# Patient Record
Sex: FEMALE | Race: WHITE | NOT HISPANIC OR LATINO | Employment: OTHER | URBAN - METROPOLITAN AREA
[De-identification: names, ages, dates, MRNs, and addresses within clinical notes are randomized per-mention and may not be internally consistent; named-entity substitution may affect disease eponyms.]

---

## 2017-12-18 ENCOUNTER — OFFICE VISIT (OUTPATIENT)
Dept: OBSTETRICS AND GYNECOLOGY | Facility: CLINIC | Age: 57
End: 2017-12-18
Attending: OBSTETRICS & GYNECOLOGY
Payer: COMMERCIAL

## 2017-12-18 VITALS
SYSTOLIC BLOOD PRESSURE: 140 MMHG | BODY MASS INDEX: 25.5 KG/M2 | DIASTOLIC BLOOD PRESSURE: 86 MMHG | HEIGHT: 62 IN | WEIGHT: 138.56 LBS

## 2017-12-18 DIAGNOSIS — Z12.31 ENCOUNTER FOR SCREENING MAMMOGRAM FOR MALIGNANT NEOPLASM OF BREAST: Primary | ICD-10-CM

## 2017-12-18 PROCEDURE — 99396 PREV VISIT EST AGE 40-64: CPT | Mod: S$GLB,,, | Performed by: OBSTETRICS & GYNECOLOGY

## 2017-12-18 PROCEDURE — 99999 PR PBB SHADOW E&M-EST. PATIENT-LVL III: CPT | Mod: PBBFAC,,, | Performed by: OBSTETRICS & GYNECOLOGY

## 2017-12-18 RX ORDER — CYCLOBENZAPRINE HCL 5 MG
TABLET ORAL
COMMUNITY
Start: 2017-11-09 | End: 2017-12-18

## 2017-12-18 NOTE — PROGRESS NOTES
Subjective:       Patient ID: Luisa Padilla is a 57 y.o. female.    Chief Complaint:  Well Woman (pap nl/hpv- 2016 mammo 2017)      History of Present Illness  - here for annual. No Gyn complaints.  is having multiple medical issues (diabetes and gout) and is having some emotional difficulty with it.    Past Medical History:   Diagnosis Date    BRCA gene mutation negative     Menopause        Past Surgical History:   Procedure Laterality Date     SECTION      x 2         Current Outpatient Prescriptions:     fexofenadine (ALLEGRA) 30 MG tablet, Take 30 mg by mouth once daily., Disp: , Rfl:     ibuprofen (ADVIL,MOTRIN) 200 MG tablet, Take 200 mg by mouth every 6 (six) hours as needed., Disp: , Rfl:     Review of patient's allergies indicates:  No Known Allergies    GYN & OB History  No LMP recorded. Patient is postmenopausal.   Date of Last Pap: 2016    OB History    Para Term  AB Living   4 2 2   2 2   SAB TAB Ectopic Multiple Live Births   2       2      # Outcome Date GA Lbr Eric/2nd Weight Sex Delivery Anes PTL Lv   4 Term 10/20/97     CS-LTranv   ERIK   3 Term 94     CS-LTranv   ERIK   2 SAB            1 SAB                   Social History     Social History    Marital status:      Spouse name: N/A    Number of children: N/A    Years of education: N/A     Occupational History    Not on file.     Social History Main Topics    Smoking status: Never Smoker    Smokeless tobacco: Never Used    Alcohol use Yes      Comment: daily    Drug use: No    Sexual activity: No     Other Topics Concern    Not on file     Social History Narrative    No narrative on file       Family History   Problem Relation Age of Onset    Breast cancer Sister     Colon cancer Neg Hx     Ovarian cancer Neg Hx        Review of Systems  Review of Systems   Respiratory: Negative for shortness of breath.    Cardiovascular: Negative for chest pain and palpitations.  "  Gastrointestinal: Negative for blood in stool, nausea and vomiting.   Genitourinary:        - see HPI   Skin: Negative for rash and wound.   Allergic/Immunologic: Negative for immunocompromised state.   Neurological: Negative for dizziness and syncope.   Hematological: Negative for adenopathy.   Psychiatric/Behavioral: Negative for behavioral problems.        Objective:     Vitals:    12/18/17 1028   BP: (!) 140/86   Weight: 62.9 kg (138 lb 9 oz)   Height: 5' 2" (1.575 m)       Physical Exam:   Constitutional: She is oriented to person, place, and time. She appears well-developed and well-nourished.        Pulmonary/Chest: Right breast exhibits no mass, no nipple discharge, no skin change, no tenderness and no swelling. Left breast exhibits no mass, no nipple discharge, no skin change, no tenderness and no swelling. Breasts are symmetrical.        Abdominal: Soft. She exhibits no distension. There is no tenderness.     Genitourinary: Vagina normal and uterus normal. There is no tenderness or lesion on the right labia. There is no tenderness or lesion on the left labia. Cervix is normal. Right adnexum displays no mass, no tenderness and no fullness. Left adnexum displays no mass, no tenderness and no fullness. No vaginal discharge found.   Genitourinary Comments: Stenotic cervical os, atrophic           Musculoskeletal: Moves all extremeties.       Neurological: She is alert and oriented to person, place, and time.     Psychiatric: She has a normal mood and affect.        Assessment/ Plan:     Orders Placed This Encounter    Mammo Digital Screening Bilat with Tomosynthesis CAD       Luisa was seen today for well woman.    Diagnoses and all orders for this visit:    Encounter for screening mammogram for malignant neoplasm of breast  -     Mammo Digital Screening Bilat with Tomosynthesis CAD; Future  -     Mammo Digital Screening Bilat with Tomosynthesis CAD        Return in about 1 year (around 12/18/2018).  "

## 2019-02-08 ENCOUNTER — OFFICE VISIT (OUTPATIENT)
Dept: OBSTETRICS AND GYNECOLOGY | Facility: CLINIC | Age: 59
End: 2019-02-08
Payer: COMMERCIAL

## 2019-02-08 VITALS
DIASTOLIC BLOOD PRESSURE: 80 MMHG | SYSTOLIC BLOOD PRESSURE: 130 MMHG | WEIGHT: 135.81 LBS | BODY MASS INDEX: 24.84 KG/M2

## 2019-02-08 DIAGNOSIS — Z12.11 SCREENING FOR COLON CANCER: ICD-10-CM

## 2019-02-08 DIAGNOSIS — Z01.419 ENCOUNTER FOR GYNECOLOGICAL EXAMINATION: Primary | ICD-10-CM

## 2019-02-08 DIAGNOSIS — Z12.31 ENCOUNTER FOR SCREENING MAMMOGRAM FOR MALIGNANT NEOPLASM OF BREAST: ICD-10-CM

## 2019-02-08 PROCEDURE — 99999 PR PBB SHADOW E&M-EST. PATIENT-LVL III: ICD-10-PCS | Mod: PBBFAC,,, | Performed by: OBSTETRICS & GYNECOLOGY

## 2019-02-08 PROCEDURE — 99396 PR PREVENTIVE VISIT,EST,40-64: ICD-10-PCS | Mod: S$GLB,,, | Performed by: OBSTETRICS & GYNECOLOGY

## 2019-02-08 PROCEDURE — 99999 PR PBB SHADOW E&M-EST. PATIENT-LVL III: CPT | Mod: PBBFAC,,, | Performed by: OBSTETRICS & GYNECOLOGY

## 2019-02-08 PROCEDURE — 99396 PREV VISIT EST AGE 40-64: CPT | Mod: S$GLB,,, | Performed by: OBSTETRICS & GYNECOLOGY

## 2019-02-08 RX ORDER — BIOTIN 1 MG
1000 TABLET ORAL 3 TIMES DAILY
COMMUNITY

## 2019-02-08 RX ORDER — MAGNESIUM 200 MG
TABLET ORAL ONCE
COMMUNITY
End: 2021-12-17

## 2019-02-08 NOTE — PROGRESS NOTES
Subjective:       Patient ID: Luisa Padilla is a 58 y.o. female.    Chief Complaint:  Well Woman (pap nl/hpv- 2016 mammo 3/2018, colonoscopy 5 yrs ago with Dr. Fallon)      History of Present Illness  - here for annual.   - Sometimes feels dizzy. Doesn't happen all the time. Sometimes at night has cramping; kind of feels like menstrual cramps but only occasionally occurs.  - taking meds for slow BMs. Doesn't drink much water.  - due for colonoscopy. Was seeing Dr. Fallon. Changing to [facility] due to insurance.  -  is still having medical issues and doesn't talk about them with his family.    Past Medical History:   Diagnosis Date    BRCA gene mutation negative     Menopause        Past Surgical History:   Procedure Laterality Date     SECTION      x 2         Current Outpatient Medications:     biotin 1 mg tablet, Take 1,000 mcg by mouth 3 (three) times daily., Disp: , Rfl:     docusate sodium (STOOL SOFTENER ORAL), Take by mouth., Disp: , Rfl:     fexofenadine (ALLEGRA) 30 MG tablet, Take 30 mg by mouth once daily., Disp: , Rfl:     magnesium 200 mg Tab, Take by mouth once., Disp: , Rfl:     Review of patient's allergies indicates:  No Known Allergies    GYN & OB History  No LMP recorded. Patient is postmenopausal.   Date of Last Pap: 2016    OB History    Para Term  AB Living   4 2 2   2 2   SAB TAB Ectopic Multiple Live Births   2       2      # Outcome Date GA Lbr Eric/2nd Weight Sex Delivery Anes PTL Lv   4 Term 10/20/97     CS-LTranv   ERIK   3 Term 94     CS-LTranv   ERIK   2 SAB            1 SAB                   Social History     Socioeconomic History    Marital status:      Spouse name: Not on file    Number of children: Not on file    Years of education: Not on file    Highest education level: Not on file   Social Needs    Financial resource strain: Not on file    Food insecurity - worry: Not on file    Food insecurity - inability: Not on file     Transportation needs - medical: Not on file    Transportation needs - non-medical: Not on file   Occupational History    Not on file   Tobacco Use    Smoking status: Never Smoker    Smokeless tobacco: Never Used   Substance and Sexual Activity    Alcohol use: Yes     Comment: daily    Drug use: No    Sexual activity: Not Currently   Other Topics Concern    Not on file   Social History Narrative    Not on file       Family History   Problem Relation Age of Onset    Breast cancer Sister     Colon cancer Neg Hx     Ovarian cancer Neg Hx        Review of Systems  Review of Systems   Respiratory: Negative for shortness of breath.    Cardiovascular: Negative for chest pain and palpitations.   Gastrointestinal: Negative for blood in stool, nausea and vomiting.   Genitourinary:        - see HPI   Skin: Negative for rash and wound.   Allergic/Immunologic: Negative for immunocompromised state.   Neurological: Negative for dizziness and syncope.   Hematological: Negative for adenopathy.   Psychiatric/Behavioral: Negative for behavioral problems.        Objective:     Vitals:    02/08/19 1117   BP: 130/80   Weight: 61.6 kg (135 lb 12.9 oz)       Physical Exam:   Constitutional: She is oriented to person, place, and time. She appears well-developed and well-nourished. She is cooperative. No distress.        Pulmonary/Chest: Right breast exhibits no mass, no nipple discharge, no skin change, no tenderness and no swelling. Left breast exhibits no mass, no nipple discharge, no skin change, no tenderness and no swelling. Breasts are symmetrical.        Abdominal: Soft. Normal appearance. She exhibits no distension. There is no tenderness.     Genitourinary: Vagina normal and uterus normal. There is no rash, tenderness or lesion on the right labia. There is no rash, tenderness or lesion on the left labia. Cervix is normal. Right adnexum displays no mass, no tenderness and no fullness. Left adnexum displays no mass, no  tenderness and no fullness. No vaginal discharge found.           Musculoskeletal: Moves all extremeties.       Neurological: She is alert and oriented to person, place, and time.     Psychiatric: She has a normal mood and affect.        Assessment/ Plan:     Orders Placed This Encounter    Mammo Digital Screening Bilat w/ Nicholas    Ambulatory Referral to Gastroenterology       Luisa was seen today for well woman.    Diagnoses and all orders for this visit:    Encounter for gynecological examination    Encounter for screening mammogram for malignant neoplasm of breast  -     Mammo Digital Screening Bilat w/ Nicholas; Future    Screening for colon cancer  -     Ambulatory Referral to Gastroenterology    - will send notice to [facility] service for GI; patient is due for colonoscopy.  - discussed how cramping may be related to less frequent BMs. Advised patient to call me if this becomes worse to consider pelvic u/s. Verbalized understanding.  - advised to increase po hydration. May want to try a decongestant if she feels that her dizziness may be related to weather change.    Follow-up in about 1 year (around 2/8/2020) for annual exam.

## 2019-03-11 ENCOUNTER — OFFICE VISIT (OUTPATIENT)
Dept: GASTROENTEROLOGY | Facility: CLINIC | Age: 59
End: 2019-03-11
Payer: COMMERCIAL

## 2019-03-11 VITALS
BODY MASS INDEX: 25.03 KG/M2 | WEIGHT: 136 LBS | SYSTOLIC BLOOD PRESSURE: 123 MMHG | HEART RATE: 75 BPM | HEIGHT: 62 IN | DIASTOLIC BLOOD PRESSURE: 57 MMHG

## 2019-03-11 DIAGNOSIS — K59.04 CHRONIC IDIOPATHIC CONSTIPATION: Primary | ICD-10-CM

## 2019-03-11 DIAGNOSIS — Z12.11 SCREEN FOR COLON CANCER: ICD-10-CM

## 2019-03-11 PROCEDURE — 99203 PR OFFICE/OUTPT VISIT, NEW, LEVL III, 30-44 MIN: ICD-10-PCS | Mod: S$GLB,,, | Performed by: INTERNAL MEDICINE

## 2019-03-11 PROCEDURE — 99999 PR PBB SHADOW E&M-EST. PATIENT-LVL III: CPT | Mod: PBBFAC,,, | Performed by: INTERNAL MEDICINE

## 2019-03-11 PROCEDURE — 99203 OFFICE O/P NEW LOW 30 MIN: CPT | Mod: S$GLB,,, | Performed by: INTERNAL MEDICINE

## 2019-03-11 PROCEDURE — 99999 PR PBB SHADOW E&M-EST. PATIENT-LVL III: ICD-10-PCS | Mod: PBBFAC,,, | Performed by: INTERNAL MEDICINE

## 2019-03-11 RX ORDER — SODIUM, POTASSIUM,MAG SULFATES 17.5-3.13G
SOLUTION, RECONSTITUTED, ORAL ORAL ONCE
Qty: 354 ML | Refills: 0 | Status: SHIPPED | OUTPATIENT
Start: 2019-03-11 | End: 2019-03-12

## 2019-03-11 NOTE — PROGRESS NOTES
"Subjective:       Patient ID: Luisa Padilla is a 58 y.o. female.    Chief Complaint: Colonoscopy    59 yo F here to discuss screening colonoscopy.  She had her first colonoscopy 10 years ago with Dr. Fallon and was told to repeat in 10 years.  She denies FH of colon cancer, change in bowel habits, rectal bleeding, or weight loss.  She has mild constipation.  She states that she took Miralax in the past, but she was not consistent with it and thus it did not help much.  She is currently taking magnesium pill and colace pill most days; when she is consistent she notes modest results, but mostly she continues to have approximately 3 BM per week.      Review of Systems   Constitutional: Negative for appetite change and unexpected weight change.   Eyes: Negative for photophobia and visual disturbance.   Respiratory: Negative for chest tightness, shortness of breath and wheezing.    Cardiovascular: Negative for chest pain, palpitations and leg swelling.   Genitourinary: Negative for dysuria, flank pain and hematuria.   Musculoskeletal: Negative for joint swelling and myalgias.   Skin: Negative for color change and rash.   Neurological: Negative for dizziness and speech difficulty.   Psychiatric/Behavioral: Negative for confusion and hallucinations.       Objective:       BP (!) 123/57 (BP Location: Right arm, Patient Position: Sitting)   Pulse 75   Ht 5' 2" (1.575 m)   Wt 61.7 kg (136 lb)   BMI 24.87 kg/m²     Physical Exam   Constitutional: She is oriented to person, place, and time. She appears well-developed and well-nourished.   Eyes: EOM are normal. Pupils are equal, round, and reactive to light.   Neck: Normal range of motion. Neck supple.   Cardiovascular: Normal rate and regular rhythm.   Pulmonary/Chest: Effort normal and breath sounds normal.   Abdominal: Soft. Bowel sounds are normal. She exhibits no distension and no mass. There is no tenderness. There is no rebound and no guarding.   Musculoskeletal: Normal " range of motion. She exhibits no edema.   Neurological: She is alert and oriented to person, place, and time.   Psychiatric: She has a normal mood and affect. Her behavior is normal. Judgment and thought content normal.         Assessment:       1. Chronic idiopathic constipation    2. Screen for colon cancer        Plan:       Chronic idiopathic constipation        -     Miralax daily, to be consumed in coffee so it is easier for her to remember.  She does not want to consider prescription meds for constipation at this time.    Screen for colon cancer   -     Case request GI: COLONOSCOPY   -     PREPOPIK 10 mg-3.5 gram-12 gram PwPk; Take 1 Package by mouth once. Use as directed on prep instructions given to you from the office for 1 dose  Dispense: 1 each; Refill: 0

## 2019-03-11 NOTE — PATIENT INSTRUCTIONS
PREPOPIK Instructions    You are scheduled for a colonoscopy with Dr. Lopez on Wednesday, April 10 at [facility] located at [address] [place] [address].  Check in at the admit desk, first floor of the hospital (which is the building on the left).     You will receive a call 2-3 days before your colonoscopy to tell you the time to arrive.  If you have not received a call by the day before your procedure, call the Endoscopy Lab at [phone].    To ensure that your test is accurate and complete, you MUST follow these instructions listed below.  If you have any questions, please call our office at [phone].  Plan on being at the hospital for your procedure for 3-4 hours.    1.  Follow a CLEAR LIQUID DIET for the entire day before your scheduled colonoscopy.  This means no solid food the entire day starting when you wake.  You may have as much of the clear liquids as you want throughout the day.   CLEAR LIQUID DIET:   - Avoid Red, Orange, Purple, and/or Blue food coloring   - NO DAIRY   - You can have:  Coffee with sugar (no creamer), tea, water, soda, apple or white grape juice, chicken or beef broth/bouillon (no meat, noodles, or veggies), green/yellow popsicles, green/yellow Jell-O, lemonade.    2.  AT 5 pm the evening before your colonoscopy, FILL THE DOSING CUP (provided inside the Prepopik box) WITH COOL WATER TO THE LOWER LINE. POUR PACKET #1 INTO CONTAINER AND STIR FOR 3 MINUTES TO MIX WELL. (it is normal for the cup to feel warm as the medicine dissolves). DRINK THE ENTIRE MIXTURE. THEN DRINK FIVE (5) DOSING CUPS OF WATER TO THE UPPER LINE OVER THE NEXT FIVE (5) HOURS.     3.  The endoscopy department will call you 2 days before your colonoscopy to tell you the exact time to arrive, AND to tell you the exact time to drink the 2nd portion of your prep (which will be FIVE HOURS BEFORE YOUR ARRIVAL TIME).  At this time given to you, FILL THE DOSING CUP (provided inside the Prepopik box) WITH COOL  WATER TO THE LOWER LINE. POUR PACKET #1 INTO CONTAINER AND STIR FOR 3 MINUTES TO MIX WELL. (it is normal for the cup to feel warm as the medicine dissolves). DRINK THE ENTIRE MIXTURE. THEN DRINK THREE (3) DOSING CUPS OF WATER TO THE UPPER LINE OVER THE NEXT ONE (1) HOUR. Once this is complete, you may not have ANYTHING else by mouth!    4.  You must have someone with you to DRIVE YOU HOME since you will be receiving IV sedation for the colonoscopy.    5.  It is ok to take your heart, blood pressure, and seizure medications in the morning of your test with a SIP of water.  Hold other medications until after your procedure.  Do NOT have anything else to eat or drink the morning of your colonoscopy.  It is ok to brush your teeth.    6.  If you are on blood thinners THAT YOU HAVE BEEN INSTRUCTED TO HOLD BY YOUR DOCTOR FOR THIS PROCEDURE, then do NOT take this the morning of your colonoscopy.  Do NOT stop these medications on your own, they must be approved to be held by your doctor.  Your colonoscopy can NOT be done if you are on these medications.  Examples of blood thinners include: Coumadin, Aggrenox, Plavix, Pradaxa, Reapro, Pletal, Xarelto, Ticagrelor, Brilinta, Eliquis, and high dose aspirin (325 mg).  You do not have to stop baby aspirin 81 mg.    7.  IF YOU ARE DIABETIC:  NO INSULIN OR ORAL MEDICATIONS THE MORNING OF THE COLONOSCOPY.  TAKE ONLY HALF THE DOSE OF YOUR INSULIN THE DAY BEFORE THE COLONOSCOPY.  DO NOT TAKE ANY ORAL DIABETIC MEDICATIONS THE DAY BEFORE THE COLONOSCOPY.  IF YOU ARE AN INSULIN DEPENDENT DIABETIC WITH UNSTABLE BLOOD SUGARDS, NOTIFY YOUR PRIMARY CARE PHYSICIAN FOR INSTRUCTIONS.

## 2019-04-01 ENCOUNTER — APPOINTMENT (OUTPATIENT)
Dept: RADIOLOGY | Facility: OTHER | Age: 59
End: 2019-04-01
Attending: OBSTETRICS & GYNECOLOGY
Payer: COMMERCIAL

## 2019-04-01 VITALS — HEIGHT: 62 IN | BODY MASS INDEX: 25.03 KG/M2 | WEIGHT: 136 LBS

## 2019-04-01 DIAGNOSIS — Z12.31 ENCOUNTER FOR SCREENING MAMMOGRAM FOR MALIGNANT NEOPLASM OF BREAST: ICD-10-CM

## 2019-04-01 PROCEDURE — 77067 SCR MAMMO BI INCL CAD: CPT | Mod: TC,PN

## 2019-04-01 PROCEDURE — 77063 MAMMO DIGITAL SCREENING BILAT WITH TOMOSYNTHESIS_CAD: ICD-10-PCS | Mod: 26,,, | Performed by: RADIOLOGY

## 2019-04-01 PROCEDURE — 77067 SCR MAMMO BI INCL CAD: CPT | Mod: 26,,, | Performed by: RADIOLOGY

## 2019-04-01 PROCEDURE — 77067 MAMMO DIGITAL SCREENING BILAT WITH TOMOSYNTHESIS_CAD: ICD-10-PCS | Mod: 26,,, | Performed by: RADIOLOGY

## 2019-04-01 PROCEDURE — 77063 BREAST TOMOSYNTHESIS BI: CPT | Mod: 26,,, | Performed by: RADIOLOGY

## 2019-04-08 ENCOUNTER — TELEPHONE (OUTPATIENT)
Dept: ENDOSCOPY | Facility: HOSPITAL | Age: 59
End: 2019-04-08

## 2019-04-10 ENCOUNTER — ANESTHESIA (OUTPATIENT)
Dept: ENDOSCOPY | Facility: HOSPITAL | Age: 59
End: 2019-04-10
Payer: COMMERCIAL

## 2019-04-10 ENCOUNTER — HOSPITAL ENCOUNTER (OUTPATIENT)
Facility: HOSPITAL | Age: 59
Discharge: HOME | End: 2019-04-10
Attending: INTERNAL MEDICINE | Admitting: INTERNAL MEDICINE
Payer: COMMERCIAL

## 2019-04-10 ENCOUNTER — ANESTHESIA EVENT (OUTPATIENT)
Dept: ENDOSCOPY | Facility: HOSPITAL | Age: 59
End: 2019-04-10
Payer: COMMERCIAL

## 2019-04-10 DIAGNOSIS — Z12.11 SCREEN FOR COLON CANCER: Primary | ICD-10-CM

## 2019-04-10 PROCEDURE — 37000008 HC ANESTHESIA 1ST 15 MINUTES: Performed by: INTERNAL MEDICINE

## 2019-04-10 PROCEDURE — 37000009 HC ANESTHESIA EA ADD 15 MINS: Performed by: INTERNAL MEDICINE

## 2019-04-10 PROCEDURE — G0121 COLON CA SCRN NOT HI RSK IND: ICD-10-PCS | Mod: ,,, | Performed by: INTERNAL MEDICINE

## 2019-04-10 PROCEDURE — 63600175 PHARM REV CODE 636 W HCPCS: Performed by: NURSE ANESTHETIST, CERTIFIED REGISTERED

## 2019-04-10 PROCEDURE — G0121 COLON CA SCRN NOT HI RSK IND: HCPCS | Mod: ,,, | Performed by: INTERNAL MEDICINE

## 2019-04-10 PROCEDURE — 25000003 PHARM REV CODE 250: Performed by: INTERNAL MEDICINE

## 2019-04-10 PROCEDURE — G0121 COLON CA SCRN NOT HI RSK IND: HCPCS | Performed by: INTERNAL MEDICINE

## 2019-04-10 RX ORDER — PROPOFOL 10 MG/ML
VIAL (ML) INTRAVENOUS CONTINUOUS PRN
Status: DISCONTINUED | OUTPATIENT
Start: 2019-04-10 | End: 2019-04-10

## 2019-04-10 RX ORDER — DEXTROMETHORPHAN/PSEUDOEPHED 2.5-7.5/.8
DROPS ORAL
Status: COMPLETED | OUTPATIENT
Start: 2019-04-10 | End: 2019-04-10

## 2019-04-10 RX ORDER — LIDOCAINE HCL/PF 100 MG/5ML
SYRINGE (ML) INTRAVENOUS
Status: DISCONTINUED | OUTPATIENT
Start: 2019-04-10 | End: 2019-04-10

## 2019-04-10 RX ORDER — SODIUM CHLORIDE 9 MG/ML
INJECTION, SOLUTION INTRAVENOUS CONTINUOUS
Status: DISCONTINUED | OUTPATIENT
Start: 2019-04-10 | End: 2019-04-10 | Stop reason: HOSPADM

## 2019-04-10 RX ORDER — SODIUM CHLORIDE 0.9 % (FLUSH) 0.9 %
10 SYRINGE (ML) INJECTION
Status: DISCONTINUED | OUTPATIENT
Start: 2019-04-10 | End: 2019-04-10 | Stop reason: HOSPADM

## 2019-04-10 RX ORDER — PROPOFOL 10 MG/ML
VIAL (ML) INTRAVENOUS
Status: DISCONTINUED | OUTPATIENT
Start: 2019-04-10 | End: 2019-04-10

## 2019-04-10 RX ADMIN — PROPOFOL 150 MCG/KG/MIN: 10 INJECTION, EMULSION INTRAVENOUS at 08:04

## 2019-04-10 RX ADMIN — LIDOCAINE HYDROCHLORIDE 60 MG: 20 INJECTION, SOLUTION INTRAVENOUS at 08:04

## 2019-04-10 RX ADMIN — SIMETHICONE 66.7 MG: 20 SUSPENSION/ DROPS ORAL at 09:04

## 2019-04-10 RX ADMIN — PROPOFOL 80 MG: 10 INJECTION, EMULSION INTRAVENOUS at 08:04

## 2019-04-10 RX ADMIN — SODIUM CHLORIDE: 0.9 INJECTION, SOLUTION INTRAVENOUS at 08:04

## 2019-04-10 RX ADMIN — PROPOFOL 200 MG: 10 INJECTION, EMULSION INTRAVENOUS at 08:04

## 2019-04-10 NOTE — TRANSFER OF CARE
"Anesthesia Transfer of Care Note    Patient: Luisa Padilla    Procedure(s) Performed: Procedure(s) (LRB):  COLONOSCOPY (N/A)    Patient location: GI    Anesthesia Type: MAC    Transport from OR: Transported from OR on room air with adequate spontaneous ventilation    Post pain: adequate analgesia    Post assessment: no apparent anesthetic complications and tolerated procedure well    Post vital signs: stable    Level of consciousness: awake, alert and oriented    Nausea/Vomiting: no nausea/vomiting    Complications: none    Transfer of care protocol was followed      Last vitals:   Visit Vitals  BP (!) 150/69 (Patient Position: Lying)   Pulse (!) 56   Temp 36.5 °C (97.7 °F) (Temporal)   Resp 18   Ht 5' 2" (1.575 m)   Wt 60.3 kg (133 lb)   SpO2 99%   Breastfeeding? No   BMI 24.33 kg/m²     "

## 2019-04-10 NOTE — ANESTHESIA POSTPROCEDURE EVALUATION
Anesthesia Post Evaluation    Patient: Luisa Padilla    Procedure(s) Performed: Procedure(s) (LRB):  COLONOSCOPY (N/A)    Final Anesthesia Type: MAC  Patient location during evaluation: GI PACU  Patient participation: Yes- Able to Participate  Level of consciousness: awake and alert and oriented  Post-procedure vital signs: reviewed and stable  Pain management: adequate  Airway patency: patent  PONV status at discharge: No PONV  Anesthetic complications: no      Cardiovascular status: blood pressure returned to baseline and hemodynamically stable  Respiratory status: unassisted, spontaneous ventilation and room air  Hydration status: euvolemic  Follow-up not needed.          Vitals Value Taken Time   /66 4/10/2019  9:24 AM   Temp 36.3 °C (97.3 °F) 4/10/2019  9:24 AM   Pulse 62 4/10/2019  9:24 AM   Resp 16 4/10/2019  9:24 AM   SpO2 99 % 4/10/2019  9:24 AM         No case tracking events are documented in the log.      Pain/Layton Score: No data recorded

## 2019-04-10 NOTE — PLAN OF CARE
Pt AAO, ambulatory. Discharge instructions given to pt, verbalized understanding.  Pt seen by MD at bedside.  Escorted out with staff member via wheelchair  and discharged with family.

## 2019-04-10 NOTE — [employer] PATIENT INSTRUCTIONS
Discharge Summary/Instructions after an Endoscopic Procedure  Patient Name: Luisa Padilla  Patient MRN: [id number]  Patient YOB: 1960     Wednesday, April 10, 2019  Lauren Lopez MD  RESTRICTIONS:  During your procedure today, you received medications for sedation.  These   medications may affect your judgment, balance and coordination.  Therefore,   for 24 hours, you have the following restrictions:   - DO NOT drive a car, operate machinery, make legal/financial decisions,   sign important papers or drink alcohol.    ACTIVITY:  Today: no heavy lifting, straining or running due to procedural   sedation/anesthesia.  The following day: return to full activity including work.  DIET:  Eat and drink normally unless instructed otherwise.     TREATMENT FOR COMMON SIDE EFFECTS:  - Mild abdominal pain, nausea, belching, bloating or excessive gas:  rest,   eat lightly and use a heating pad.  - Sore Throat: treat with throat lozenges and/or gargle with warm salt   water.  - Because air was used during the procedure, expelling large amounts of air   from your rectum or belching is normal.  - If a bowel prep was taken, you may not have a bowel movement for 1-3 days.    This is normal.  SYMPTOMS TO WATCH FOR AND REPORT TO YOUR PHYSICIAN:  1. Abdominal pain or bloating, other than gas cramps.  2. Chest pain.  3. Back pain.  4. Signs of infection such as: chills or fever occurring within 24 hours   after the procedure.  5. Rectal bleeding, which would show as bright red, maroon, or black stools.   (A tablespoon of blood from the rectum is not serious, especially if   hemorrhoids are present.)  6. Vomiting.  7. Weakness or dizziness.  GO DIRECTLY TO THE NEAREST EMERGENCY ROOM IF YOU HAVE ANY OF THE FOLLOWING:      Difficulty breathing              Chills and/or fever over 101 F   Persistent vomiting and/or vomiting blood   Severe abdominal pain   Severe chest pain   Black, tarry stools   Bleeding- more than one  tablespoon   Any other symptom or condition that you feel may need urgent attention  Your doctor recommends these additional instructions:  If any biopsies were taken, your doctors clinic will contact you in 1 to 2   weeks with any results.  - Discharge patient to home.   - Patient has a contact number available for emergencies.  The signs and   symptoms of potential delayed complications were discussed with the   patient.  Return to normal activities tomorrow.  Written discharge   instructions were provided to the patient.   - Resume previous diet.   - Continue present medications.   - Repeat colonoscopy in 10 years for screening purposes.   - Return to GI clinic PRN.  For questions, problems or results please call your physician - Lauren Lopez MD at Work:  [phone].  EMERGENCY PHONE NUMBER: [phone],  LAB RESULTS: [phone]  IF A COMPLICATION OR EMERGENCY SITUATION ARISES AND YOU ARE UNABLE TO REACH   YOUR PHYSICIAN - GO DIRECTLY TO THE EMERGENCY ROOM.  MD Lauren Grant MD  4/10/2019 9:23:24 AM  This report has been verified and signed electronically.  [employer]

## 2019-04-10 NOTE — ANESTHESIA PREPROCEDURE EVALUATION
04/10/2019  Luisa Padilla is a 58 y.o., female for colonoscopy under MAC    Past Medical History:   Diagnosis Date    BRCA gene mutation negative     Menopause          Anesthesia Evaluation    I have reviewed the Patient Summary Reports.    I have reviewed the Nursing Notes.   I have reviewed the Medications.     Review of Systems  Social:  Non-Smoker    Cardiovascular:  Cardiovascular Normal     Pulmonary:  Pulmonary Normal        Physical Exam  General:  Well nourished    Airway/Jaw/Neck:  Airway Findings: Mallampati: II      Chest/Lungs:  Chest/Lungs Clear    Heart/Vascular:  Heart Findings: Normal            Anesthesia Plan  Type of Anesthesia, risks & benefits discussed:  Anesthesia Type:  MAC  Patient's Preference:   Intra-op Monitoring Plan:   Intra-op Monitoring Plan Comments:   Post Op Pain Control Plan:   Post Op Pain Control Plan Comments:   Induction:    Beta Blocker:  Patient is not currently on a Beta-Blocker (No further documentation required).       Informed Consent: Patient understands risks and agrees with Anesthesia plan.  Questions answered. Anesthesia consent signed with patient.  ASA Score: 1     Day of Surgery Review of History & Physical:            Ready For Surgery From Anesthesia Perspective.

## 2019-04-11 VITALS
TEMPERATURE: 97 F | SYSTOLIC BLOOD PRESSURE: 131 MMHG | BODY MASS INDEX: 24.48 KG/M2 | HEART RATE: 56 BPM | RESPIRATION RATE: 16 BRPM | WEIGHT: 133 LBS | HEIGHT: 62 IN | OXYGEN SATURATION: 100 % | DIASTOLIC BLOOD PRESSURE: 62 MMHG

## 2019-04-12 ENCOUNTER — TELEPHONE (OUTPATIENT)
Dept: OBSTETRICS AND GYNECOLOGY | Facility: CLINIC | Age: 59
End: 2019-04-12

## 2019-04-12 DIAGNOSIS — Z91.89 INCREASED RISK OF BREAST CANCER: Primary | ICD-10-CM

## 2019-04-12 NOTE — TELEPHONE ENCOUNTER
----- Message from Mame Griffith MD sent at 4/2/2019  2:21 PM CDT -----  Lm for patient. Lifetime increased risk of breast cancer was recalculated and is 34.82%. I advised patient to call back and ask for your to schedule with breast surgery center. If she doesn't call by tomorrow, please call her and f/u.

## 2019-04-12 NOTE — TELEPHONE ENCOUNTER
Spoke with pt and informed of results.  Referral is in and pt is aware someone will call her to set up an appt.

## 2020-04-16 ENCOUNTER — TELEPHONE (OUTPATIENT)
Dept: OBSTETRICS AND GYNECOLOGY | Facility: CLINIC | Age: 60
End: 2020-04-16

## 2020-04-16 DIAGNOSIS — Z12.31 ENCOUNTER FOR SCREENING MAMMOGRAM FOR BREAST CANCER: Primary | ICD-10-CM

## 2020-04-16 NOTE — TELEPHONE ENCOUNTER
Akbar COSBY Staff 14 minutes ago (1:00 PM)      Pt requesting orders for a mammo be entered. Pt will call back to schedule.    Routing comment       Luisa Padilla [phone]  Akbar Peterson      Orders entered for pt

## 2020-04-17 ENCOUNTER — PATIENT MESSAGE (OUTPATIENT)
Dept: FAMILY MEDICINE | Facility: CLINIC | Age: 60
End: 2020-04-17

## 2020-05-19 ENCOUNTER — APPOINTMENT (OUTPATIENT)
Dept: RADIOLOGY | Facility: OTHER | Age: 60
End: 2020-05-19
Attending: OBSTETRICS & GYNECOLOGY
Payer: COMMERCIAL

## 2020-05-19 VITALS — BODY MASS INDEX: 24.46 KG/M2 | HEIGHT: 62 IN | WEIGHT: 132.94 LBS

## 2020-05-19 DIAGNOSIS — Z12.31 ENCOUNTER FOR SCREENING MAMMOGRAM FOR BREAST CANCER: ICD-10-CM

## 2020-05-19 PROCEDURE — 77067 SCR MAMMO BI INCL CAD: CPT | Mod: TC,PN

## 2020-05-19 PROCEDURE — 77067 SCR MAMMO BI INCL CAD: CPT | Mod: 26,,, | Performed by: RADIOLOGY

## 2020-05-19 PROCEDURE — 77063 BREAST TOMOSYNTHESIS BI: CPT | Mod: 26,,, | Performed by: RADIOLOGY

## 2020-05-19 PROCEDURE — 77063 MAMMO DIGITAL SCREENING BILAT WITH TOMOSYNTHESIS_CAD: ICD-10-PCS | Mod: 26,,, | Performed by: RADIOLOGY

## 2020-05-19 PROCEDURE — 77067 MAMMO DIGITAL SCREENING BILAT WITH TOMOSYNTHESIS_CAD: ICD-10-PCS | Mod: 26,,, | Performed by: RADIOLOGY

## 2020-06-17 ENCOUNTER — OFFICE VISIT (OUTPATIENT)
Dept: INTERNAL MEDICINE | Facility: CLINIC | Age: 60
End: 2020-06-17
Payer: COMMERCIAL

## 2020-06-17 ENCOUNTER — LAB VISIT (OUTPATIENT)
Dept: LAB | Facility: HOSPITAL | Age: 60
End: 2020-06-17
Payer: COMMERCIAL

## 2020-06-17 DIAGNOSIS — K59.00 CONSTIPATION, UNSPECIFIED CONSTIPATION TYPE: ICD-10-CM

## 2020-06-17 DIAGNOSIS — R53.83 FATIGUE, UNSPECIFIED TYPE: ICD-10-CM

## 2020-06-17 DIAGNOSIS — G47.00 INSOMNIA, UNSPECIFIED TYPE: ICD-10-CM

## 2020-06-17 DIAGNOSIS — H53.9 VISUAL CHANGES: Primary | ICD-10-CM

## 2020-06-17 DIAGNOSIS — F43.29 STRESS AND ADJUSTMENT REACTION: ICD-10-CM

## 2020-06-17 DIAGNOSIS — H53.9 VISUAL CHANGES: ICD-10-CM

## 2020-06-17 LAB
ALBUMIN SERPL BCP-MCNC: 4.4 G/DL (ref 3.5–5.2)
ALP SERPL-CCNC: 63 U/L (ref 55–135)
ALT SERPL W/O P-5'-P-CCNC: 27 U/L (ref 10–44)
ANION GAP SERPL CALC-SCNC: 10 MMOL/L (ref 8–16)
AST SERPL-CCNC: 30 U/L (ref 10–40)
BASOPHILS # BLD AUTO: 0.05 K/UL (ref 0–0.2)
BASOPHILS NFR BLD: 0.9 % (ref 0–1.9)
BILIRUB SERPL-MCNC: 0.4 MG/DL (ref 0.1–1)
BUN SERPL-MCNC: 18 MG/DL (ref 8–23)
CALCIUM SERPL-MCNC: 9.4 MG/DL (ref 8.7–10.5)
CHLORIDE SERPL-SCNC: 106 MMOL/L (ref 95–110)
CO2 SERPL-SCNC: 24 MMOL/L (ref 23–29)
CREAT SERPL-MCNC: 0.8 MG/DL (ref 0.5–1.4)
DIFFERENTIAL METHOD: ABNORMAL
EOSINOPHIL # BLD AUTO: 0.2 K/UL (ref 0–0.5)
EOSINOPHIL NFR BLD: 2.9 % (ref 0–8)
ERYTHROCYTE [DISTWIDTH] IN BLOOD BY AUTOMATED COUNT: 14.2 % (ref 11.5–14.5)
EST. GFR  (AFRICAN AMERICAN): >60 ML/MIN/1.73 M^2
EST. GFR  (NON AFRICAN AMERICAN): >60 ML/MIN/1.73 M^2
GLUCOSE SERPL-MCNC: 95 MG/DL (ref 70–110)
HCT VFR BLD AUTO: 43 % (ref 37–48.5)
HGB BLD-MCNC: 13.4 G/DL (ref 12–16)
IMM GRANULOCYTES # BLD AUTO: 0.02 K/UL (ref 0–0.04)
IMM GRANULOCYTES NFR BLD AUTO: 0.4 % (ref 0–0.5)
LYMPHOCYTES # BLD AUTO: 1.7 K/UL (ref 1–4.8)
LYMPHOCYTES NFR BLD: 30 % (ref 18–48)
MCH RBC QN AUTO: 30.7 PG (ref 27–31)
MCHC RBC AUTO-ENTMCNC: 31.2 G/DL (ref 32–36)
MCV RBC AUTO: 99 FL (ref 82–98)
MONOCYTES # BLD AUTO: 0.6 K/UL (ref 0.3–1)
MONOCYTES NFR BLD: 10.6 % (ref 4–15)
NEUTROPHILS # BLD AUTO: 3.1 K/UL (ref 1.8–7.7)
NEUTROPHILS NFR BLD: 55.2 % (ref 38–73)
NRBC BLD-RTO: 0 /100 WBC
PLATELET # BLD AUTO: 191 K/UL (ref 150–350)
PMV BLD AUTO: 11.2 FL (ref 9.2–12.9)
POTASSIUM SERPL-SCNC: 4 MMOL/L (ref 3.5–5.1)
PROT SERPL-MCNC: 7.4 G/DL (ref 6–8.4)
RBC # BLD AUTO: 4.36 M/UL (ref 4–5.4)
SODIUM SERPL-SCNC: 140 MMOL/L (ref 136–145)
WBC # BLD AUTO: 5.57 K/UL (ref 3.9–12.7)

## 2020-06-17 PROCEDURE — 85025 COMPLETE CBC W/AUTO DIFF WBC: CPT

## 2020-06-17 PROCEDURE — 99214 OFFICE O/P EST MOD 30 MIN: CPT | Mod: S$GLB,,, | Performed by: NURSE PRACTITIONER

## 2020-06-17 PROCEDURE — 99999 PR PBB SHADOW E&M-EST. PATIENT-LVL IV: ICD-10-PCS | Mod: PBBFAC,,, | Performed by: NURSE PRACTITIONER

## 2020-06-17 PROCEDURE — 99214 PR OFFICE/OUTPT VISIT, EST, LEVL IV, 30-39 MIN: ICD-10-PCS | Mod: S$GLB,,, | Performed by: NURSE PRACTITIONER

## 2020-06-17 PROCEDURE — 99999 PR PBB SHADOW E&M-EST. PATIENT-LVL IV: CPT | Mod: PBBFAC,,, | Performed by: NURSE PRACTITIONER

## 2020-06-17 PROCEDURE — 36415 COLL VENOUS BLD VENIPUNCTURE: CPT | Mod: PO

## 2020-06-17 PROCEDURE — 83036 HEMOGLOBIN GLYCOSYLATED A1C: CPT

## 2020-06-17 PROCEDURE — 80053 COMPREHEN METABOLIC PANEL: CPT

## 2020-06-17 RX ORDER — AMOXICILLIN 500 MG
1 CAPSULE ORAL DAILY
COMMUNITY
End: 2021-12-17

## 2020-06-17 RX ORDER — AA/PROT/LYSINE/METHIO/VIT C/B6 50-12.5 MG
10 TABLET ORAL DAILY
COMMUNITY
End: 2021-12-17

## 2020-06-17 NOTE — PROGRESS NOTES
"[facility] Note    Chief Complaint      Chief Complaint   Patient presents with    Establish Care     Pt had wellness visit, eyes are deteriorating w/in past 8 mo, requesting labs    Annual Exam     History of Present Illness      Luisa Padilla is a 60 y.o. female patient of Dr. Banegas who is new to me and presents today for establish care visit. Pt reports that she is concerned of her blood sugars being elevated. Dr. Alexander-retina is concerned of her maybe having diabetes due to her "eye sight" has decreased in such a short time. BP elevated in clinic.   Pt reports that she also had flu like symptoms beginning of March.     Labs-ordered CBC, CMP and A1c  Shingles #1-11/19/18; #2-9/10/19  Influenza- 11/7/19  Last A1c-5.8%      Problem List Items Addressed This Visit     None      Visit Diagnoses     Visual changes    -  Primary    Relevant Orders    Hemoglobin A1C (Completed)    CBC auto differential (Completed)    Comprehensive metabolic panel (Completed)    Fatigue, unspecified type        Relevant Orders    Hemoglobin A1C (Completed)    CBC auto differential (Completed)    Comprehensive metabolic panel (Completed)    Stress and adjustment reaction        Relevant Orders    Hemoglobin A1C (Completed)    CBC auto differential (Completed)    Comprehensive metabolic panel (Completed)    Insomnia, unspecified type        Relevant Orders    Hemoglobin A1C (Completed)    CBC auto differential (Completed)    Comprehensive metabolic panel (Completed)    Constipation, unspecified constipation type        Relevant Orders    Hemoglobin A1C (Completed)    CBC auto differential (Completed)    Comprehensive metabolic panel (Completed)          Health Maintenance   Topic Date Due    Hepatitis C Screening  1960    Lipid Panel  1960    TETANUS VACCINE  04/11/1976    Mammogram  05/19/2022       Past Medical History:   Diagnosis Date    BRCA gene mutation negative     Menopause        Past " "Surgical History:   Procedure Laterality Date     SECTION      x 2    COLONOSCOPY N/A 4/10/2019    Procedure: COLONOSCOPY;  Surgeon: Lauren Lopez MD;  Location: [facility];  Service: Endoscopy;  Laterality: N/A;       family history includes BRCA 1/2 in her sister; Breast cancer in her paternal aunt; Breast cancer (age of onset: 48) in her sister.    Social History     Tobacco Use    Smoking status: Never Smoker    Smokeless tobacco: Never Used   Substance Use Topics    Alcohol use: Yes     Comment: daily    Drug use: No       Review of Systems   Constitutional: Negative for chills and fever.   Eyes: Positive for blurred vision.   Respiratory: Negative for shortness of breath.    Cardiovascular: Negative for chest pain.   Gastrointestinal: Negative for abdominal pain.        Outpatient Encounter Medications as of 2020   Medication Sig Dispense Refill    biotin 1 mg tablet Take 1,000 mcg by mouth 3 (three) times daily.      coenzyme Q10 10 mg capsule Take 10 mg by mouth once daily.      docusate sodium (STOOL SOFTENER ORAL) Take by mouth.      fexofenadine (ALLEGRA) 30 MG tablet Take 30 mg by mouth once daily.      levocarnitine tartrate (CARNITINE, TARTRATE, ORAL) Take by mouth once daily.      magnesium 200 mg Tab Take by mouth once.      omega-3 fatty acids/fish oil (FISH OIL-OMEGA-3 FATTY ACIDS) 300-1,000 mg capsule Take 1 capsule by mouth once daily.      vit C/E/zinc ox/lester/lut/zeax (ICAPS AREDS2 ORAL) Take by mouth.       No facility-administered encounter medications on file as of 2020.         Review of patient's allergies indicates:  No Known Allergies    Physical Exam      Vital Signs  Temp: 97 °F (36.1 °C)  Temp src: Oral  Pulse: 60  Resp: 18  SpO2: 99 %  BP: (!) 146/80  BP Location: Left arm  Patient Position: Sitting  Pain Score: 0-No pain  Height and Weight  Height: 5' 2" (157.5 cm)  Weight: 64.1 kg (141 lb 5 oz)  BSA (Calculated - sq m): 1.67 sq meters  BMI " (Calculated): 25.8  Weight in (lb) to have BMI = 25: 136.4]    Physical Exam  Vitals signs and nursing note reviewed.   Constitutional:       Appearance: Normal appearance. She is well-developed.   HENT:      Head: Normocephalic and atraumatic.      Right Ear: External ear normal.      Left Ear: External ear normal.   Eyes:      Conjunctiva/sclera: Conjunctivae normal.      Pupils: Pupils are equal, round, and reactive to light.   Neck:      Musculoskeletal: Normal range of motion and neck supple.      Thyroid: No thyromegaly.      Vascular: No JVD.      Trachea: No tracheal deviation.   Cardiovascular:      Rate and Rhythm: Normal rate and regular rhythm.      Heart sounds: Normal heart sounds. No murmur.   Pulmonary:      Effort: Pulmonary effort is normal.      Breath sounds: Normal breath sounds.   Chest:      Chest wall: No tenderness.   Abdominal:      Palpations: Abdomen is soft.   Musculoskeletal: Normal range of motion.   Lymphadenopathy:      Cervical: No cervical adenopathy.   Skin:     General: Skin is warm and dry.   Neurological:      Mental Status: She is alert and oriented to person, place, and time.   Psychiatric:         Behavior: Behavior normal.         Thought Content: Thought content normal.         Judgment: Judgment normal.          Laboratory:  CBC:  Recent Labs   Lab Result Units 06/17/20  1356   WBC K/uL 5.57   RBC M/uL 4.36   Hemoglobin g/dL 13.4   Hematocrit % 43.0   Platelets K/uL 191   Mean Corpuscular Volume fL 99*   Mean Corpuscular Hemoglobin pg 30.7   Mean Corpuscular Hemoglobin Conc g/dL 31.2*     CMP:  Recent Labs   Lab Result Units 06/17/20  1356   Glucose mg/dL 95   Calcium mg/dL 9.4   Albumin g/dL 4.4   Total Protein g/dL 7.4   Sodium mmol/L 140   Potassium mmol/L 4.0   CO2 mmol/L 24   Chloride mmol/L 106   BUN, Bld mg/dL 18   Alkaline Phosphatase U/L 63   ALT U/L 27   AST U/L 30   Total Bilirubin mg/dL 0.4     URINALYSIS:  No results for input(s): COLORU, CLARITYU, SPECGRAV,  PHUR, PROTEINUA, GLUCOSEU, BILIRUBINCON, BLOODU, WBCU, RBCU, BACTERIA, MUCUS, NITRITE, LEUKOCYTESUR, UROBILINOGEN, HYALINECASTS in the last 2160 hours.   LIPIDS:  No results for input(s): TSH, HDL, CHOL, TRIG, LDLCALC, CHOLHDL, NONHDLCHOL, TOTALCHOLEST in the last 2160 hours.  TSH:  No results for input(s): TSH in the last 2160 hours.  A1C:  Recent Labs   Lab Result Units 06/17/20  1356   Hemoglobin A1C % 5.7*         Assessment/Plan     Luisa Padilla is a 60 y.o.female with:    1. Visual changes  - Hemoglobin A1C; Future  - CBC auto differential; Future  - Comprehensive metabolic panel; Future    2. Fatigue, unspecified type  - Hemoglobin A1C; Future  - CBC auto differential; Future  - Comprehensive metabolic panel; Future    3. Stress and adjustment reaction  - Hemoglobin A1C; Future  - CBC auto differential; Future  - Comprehensive metabolic panel; Future    4. Insomnia, unspecified type  - Hemoglobin A1C; Future  - CBC auto differential; Future  - Comprehensive metabolic panel; Future    5. Constipation, unspecified constipation type  - Hemoglobin A1C; Future  - CBC auto differential; Future  - Comprehensive metabolic panel; Future      -Continue current medications and maintain follow up with specialists.  Return to clinic as needed.   Pt will need to establish with her PCP Dr. Nelson  Will need medical records from [facility]       Erin Jiminez, NP-C [facility] [place]

## 2020-06-18 LAB
ESTIMATED AVG GLUCOSE: 117 MG/DL (ref 68–131)
HBA1C MFR BLD HPLC: 5.7 % (ref 4–5.6)

## 2020-06-19 ENCOUNTER — PATIENT MESSAGE (OUTPATIENT)
Dept: INTERNAL MEDICINE | Facility: CLINIC | Age: 60
End: 2020-06-19

## 2020-06-23 VITALS
OXYGEN SATURATION: 99 % | RESPIRATION RATE: 18 BRPM | HEIGHT: 62 IN | WEIGHT: 141.31 LBS | HEART RATE: 60 BPM | BODY MASS INDEX: 26.01 KG/M2 | TEMPERATURE: 97 F | DIASTOLIC BLOOD PRESSURE: 80 MMHG | SYSTOLIC BLOOD PRESSURE: 146 MMHG

## 2020-06-30 ENCOUNTER — LAB VISIT (OUTPATIENT)
Dept: PRIMARY CARE CLINIC | Facility: OTHER | Age: 60
End: 2020-06-30
Attending: INTERNAL MEDICINE
Payer: COMMERCIAL

## 2020-06-30 DIAGNOSIS — Z03.818 ENCOUNTER FOR OBSERVATION FOR SUSPECTED EXPOSURE TO OTHER BIOLOGICAL AGENTS RULED OUT: ICD-10-CM

## 2020-06-30 PROCEDURE — U0003 INFECTIOUS AGENT DETECTION BY NUCLEIC ACID (DNA OR RNA); SEVERE ACUTE RESPIRATORY SYNDROME CORONAVIRUS 2 (SARS-COV-2) (CORONAVIRUS DISEASE [COVID-19]), AMPLIFIED PROBE TECHNIQUE, MAKING USE OF HIGH THROUGHPUT TECHNOLOGIES AS DESCRIBED BY CMS-2020-01-R: HCPCS | Mod: ST72

## 2020-07-05 LAB — SARS-COV-2 RNA RESP QL NAA+PROBE: NEGATIVE

## 2020-07-24 ENCOUNTER — OFFICE VISIT (OUTPATIENT)
Dept: OBSTETRICS AND GYNECOLOGY | Facility: CLINIC | Age: 60
End: 2020-07-24
Payer: COMMERCIAL

## 2020-07-24 VITALS
SYSTOLIC BLOOD PRESSURE: 128 MMHG | DIASTOLIC BLOOD PRESSURE: 74 MMHG | HEIGHT: 62 IN | BODY MASS INDEX: 25.95 KG/M2 | WEIGHT: 141 LBS | TEMPERATURE: 98 F

## 2020-07-24 DIAGNOSIS — Z12.4 ENCOUNTER FOR SCREENING FOR CERVICAL CANCER: ICD-10-CM

## 2020-07-24 DIAGNOSIS — Z91.89 INCREASED RISK OF BREAST CANCER: ICD-10-CM

## 2020-07-24 DIAGNOSIS — Z12.31 ENCOUNTER FOR SCREENING MAMMOGRAM FOR BREAST CANCER: ICD-10-CM

## 2020-07-24 DIAGNOSIS — Z11.51 ENCOUNTER FOR SCREENING FOR HUMAN PAPILLOMAVIRUS (HPV): ICD-10-CM

## 2020-07-24 DIAGNOSIS — Z01.419 ENCOUNTER FOR GYNECOLOGICAL EXAMINATION: Primary | ICD-10-CM

## 2020-07-24 PROCEDURE — 87624 HPV HI-RISK TYP POOLED RSLT: CPT

## 2020-07-24 PROCEDURE — 99999 PR PBB SHADOW E&M-EST. PATIENT-LVL III: ICD-10-PCS | Mod: PBBFAC,,, | Performed by: OBSTETRICS & GYNECOLOGY

## 2020-07-24 PROCEDURE — 99396 PREV VISIT EST AGE 40-64: CPT | Mod: S$GLB,,, | Performed by: OBSTETRICS & GYNECOLOGY

## 2020-07-24 PROCEDURE — 99396 PR PREVENTIVE VISIT,EST,40-64: ICD-10-PCS | Mod: S$GLB,,, | Performed by: OBSTETRICS & GYNECOLOGY

## 2020-07-24 PROCEDURE — 88175 CYTOPATH C/V AUTO FLUID REDO: CPT

## 2020-07-24 PROCEDURE — 99999 PR PBB SHADOW E&M-EST. PATIENT-LVL III: CPT | Mod: PBBFAC,,, | Performed by: OBSTETRICS & GYNECOLOGY

## 2020-07-24 NOTE — PROGRESS NOTES
Subjective:       Patient ID: Luisa Padilla is a 60 y.o. female.    Chief Complaint:  Well Woman (last pap and HPV negative 16, last mammogram wnl 20, last dexa normal 14, last colonoscopy 4/10/19)      History of Present Illness  - here for annual. No Gyn complaints. Being treated for a deteriorating retina. Donated blood to see if COVID antibody is positive.    Past Medical History:   Diagnosis Date    BRCA gene mutation negative     Menopause        Past Surgical History:   Procedure Laterality Date     SECTION      x 2    COLONOSCOPY N/A 4/10/2019    Procedure: COLONOSCOPY;  Surgeon: Lauren Lopez MD;  Location: [facility];  Service: Endoscopy;  Laterality: N/A;         Current Outpatient Medications:     biotin 1 mg tablet, Take 1,000 mcg by mouth 3 (three) times daily., Disp: , Rfl:     coenzyme Q10 10 mg capsule, Take 10 mg by mouth once daily., Disp: , Rfl:     docusate sodium (STOOL SOFTENER ORAL), Take by mouth., Disp: , Rfl:     fexofenadine (ALLEGRA) 30 MG tablet, Take 30 mg by mouth once daily., Disp: , Rfl:     levocarnitine tartrate (CARNITINE, TARTRATE, ORAL), Take by mouth once daily., Disp: , Rfl:     magnesium 200 mg Tab, Take by mouth once., Disp: , Rfl:     omega-3 fatty acids/fish oil (FISH OIL-OMEGA-3 FATTY ACIDS) 300-1,000 mg capsule, Take 1 capsule by mouth once daily., Disp: , Rfl:     vit C/E/zinc ox/lester/lut/zeax (ICAPS AREDS2 ORAL), Take by mouth., Disp: , Rfl:     Review of patient's allergies indicates:  No Known Allergies    GYN & OB History  No LMP recorded. Patient is postmenopausal.   Date of Last Pap: 2016    OB History    Para Term  AB Living   4 2 2   2 2   SAB TAB Ectopic Multiple Live Births   2       2      # Outcome Date GA Lbr Eric/2nd Weight Sex Delivery Anes PTL Lv   4 Term 10/20/97     CS-LTranv   ERIK   3 Term 94     CS-LTranv   ERIK   2 SAB            1 SAB                Social History      Socioeconomic History    Marital status:      Spouse name: Not on file    Number of children: Not on file    Years of education: Not on file    Highest education level: Not on file   Occupational History    Not on file   Social Needs    Financial resource strain: Not on file    Food insecurity     Worry: Not on file     Inability: Not on file    Transportation needs     Medical: Not on file     Non-medical: Not on file   Tobacco Use    Smoking status: Never Smoker    Smokeless tobacco: Never Used   Substance and Sexual Activity    Alcohol use: Yes     Comment: daily    Drug use: No    Sexual activity: Not Currently   Lifestyle    Physical activity     Days per week: Not on file     Minutes per session: Not on file    Stress: Not on file   Relationships    Social connections     Talks on phone: Not on file     Gets together: Not on file     Attends Cheondo[employer] service: Not on file     Active member of club or organization: Not on file     Attends meetings of clubs or organizations: Not on file     Relationship status: Not on file   Other Topics Concern    Not on file   Social History Narrative    Not on file       Family History   Problem Relation Age of Onset    Breast cancer Sister 48    BRCA 1/2 Sister     Breast cancer Paternal Aunt     Colon cancer Neg Hx     Ovarian cancer Neg Hx        Review of Systems  Review of Systems   Respiratory: Negative for shortness of breath.    Cardiovascular: Negative for chest pain and palpitations.   Gastrointestinal: Negative for blood in stool, nausea and vomiting.   Genitourinary:        - see HPI   Skin: Negative for rash and wound.   Allergic/Immunologic: Negative for immunocompromised state.   Neurological: Negative for dizziness and syncope.   Hematological: Negative for adenopathy.   Psychiatric/Behavioral: Negative for behavioral problems.        Objective:     Vitals:    07/24/20 1408   BP: 128/74   Temp: 98 °F (36.7 °C)   Weight: 64 kg  "(140 lb 15.8 oz)   Height: 5' 2" (1.575 m)       Physical Exam:   Constitutional: She is oriented to person, place, and time. She appears well-developed and well-nourished.        Pulmonary/Chest: Right breast exhibits no mass, no nipple discharge, no skin change, no tenderness and no swelling. Left breast exhibits no mass, no nipple discharge, no skin change, no tenderness and no swelling. Breasts are symmetrical.        Abdominal: Soft. She exhibits no distension. There is no abdominal tenderness.     Genitourinary:    Vagina and uterus normal.   There is no tenderness or lesion on the right labia. There is no tenderness or lesion on the left labia. Cervix is normal. Right adnexum displays no mass, no tenderness and no fullness. Left adnexum displays no mass, no tenderness and no fullness. Additional cervical findings: pap smear donenegative for vaginal discharge          Musculoskeletal: Moves all extremeties.       Neurological: She is alert and oriented to person, place, and time.     Psychiatric: She has a normal mood and affect.        Assessment/ Plan:     Orders Placed This Encounter    HPV High Risk Genotypes, PCR    Mammo Digital Screening Bilat w/ Nicholas    Ambulatory referral/consult to Breast Surgery    Liquid-Based Pap Smear, Screening       Luisa was seen today for well woman.    Diagnoses and all orders for this visit:    Encounter for gynecological examination    Encounter for screening for cervical cancer   -     Liquid-Based Pap Smear, Screening    Encounter for screening for human papillomavirus (HPV)  -     HPV High Risk Genotypes, PCR    Encounter for screening mammogram for breast cancer  -     Mammo Digital Screening Bilat w/ Nicholas; Future    Increased risk of breast cancer  -     Ambulatory referral/consult to Breast Surgery; Future    - reviewed mammogram: will refer to breast clinic due to lifetime increased risk    Follow up in about 1 year (around 7/24/2021) for annual exam.    "

## 2020-07-30 ENCOUNTER — TELEPHONE (OUTPATIENT)
Dept: SURGERY | Facility: CLINIC | Age: 60
End: 2020-07-30

## 2020-07-30 NOTE — TELEPHONE ENCOUNTER
Contacted the patient regarding breast clinic referral.  The patient is scheduled to be seen on Monday 9/21/2020, 10:30 am with Cece Martin PA-C at [facility].  Patient voiced understanding of appointment date, time, and location.  Reminder letter mailed to the patient.

## 2020-08-03 LAB
HPV HR 12 DNA SPEC QL NAA+PROBE: NEGATIVE
HPV16 AG SPEC QL: NEGATIVE
HPV18 DNA SPEC QL NAA+PROBE: NEGATIVE

## 2020-08-10 LAB
FINAL PATHOLOGIC DIAGNOSIS: NORMAL
Lab: NORMAL

## 2020-09-02 ENCOUNTER — TELEPHONE (OUTPATIENT)
Dept: INTERNAL MEDICINE | Facility: CLINIC | Age: 60
End: 2020-09-02

## 2020-09-11 ENCOUNTER — PATIENT MESSAGE (OUTPATIENT)
Dept: INTERNAL MEDICINE | Facility: CLINIC | Age: 60
End: 2020-09-11

## 2020-09-11 DIAGNOSIS — R53.83 FATIGUE, UNSPECIFIED TYPE: ICD-10-CM

## 2020-09-11 DIAGNOSIS — Z83.49 FAMILY HISTORY OF HYPOTHYROIDISM: Primary | ICD-10-CM

## 2020-09-11 DIAGNOSIS — H57.9 EYE DISEASE: ICD-10-CM

## 2020-09-11 DIAGNOSIS — K59.00 CONSTIPATION, UNSPECIFIED CONSTIPATION TYPE: ICD-10-CM

## 2020-11-05 ENCOUNTER — OFFICE VISIT (OUTPATIENT)
Dept: OPHTHALMOLOGY | Facility: CLINIC | Age: 60
End: 2020-11-05
Payer: COMMERCIAL

## 2020-11-05 DIAGNOSIS — H35.372 EPIRETINAL MEMBRANE, LEFT: Primary | ICD-10-CM

## 2020-11-05 DIAGNOSIS — H25.13 NS (NUCLEAR SCLEROSIS), BILATERAL: ICD-10-CM

## 2020-11-05 PROCEDURE — 92202 OPSCPY EXTND ON/MAC DRAW: CPT | Mod: S$GLB,,, | Performed by: OPHTHALMOLOGY

## 2020-11-05 PROCEDURE — 92134 POSTERIOR SEGMENT OCT RETINA (OCULAR COHERENCE TOMOGRAPHY)-BOTH EYES: ICD-10-PCS | Mod: S$GLB,,, | Performed by: OPHTHALMOLOGY

## 2020-11-05 PROCEDURE — 99999 PR PBB SHADOW E&M-EST. PATIENT-LVL III: CPT | Mod: PBBFAC,,, | Performed by: OPHTHALMOLOGY

## 2020-11-05 PROCEDURE — 92004 PR EYE EXAM, NEW PATIENT,COMPREHESV: ICD-10-PCS | Mod: S$GLB,,, | Performed by: OPHTHALMOLOGY

## 2020-11-05 PROCEDURE — 92202 PR OPHTHALMOSCOPY, EXT, W/DRAW OPTIC NERVE/MACULA, I&R, UNI/BI: ICD-10-PCS | Mod: S$GLB,,, | Performed by: OPHTHALMOLOGY

## 2020-11-05 PROCEDURE — 92004 COMPRE OPH EXAM NEW PT 1/>: CPT | Mod: S$GLB,,, | Performed by: OPHTHALMOLOGY

## 2020-11-05 PROCEDURE — 92134 CPTRZ OPH DX IMG PST SGM RTA: CPT | Mod: S$GLB,,, | Performed by: OPHTHALMOLOGY

## 2020-11-05 PROCEDURE — 99999 PR PBB SHADOW E&M-EST. PATIENT-LVL III: ICD-10-PCS | Mod: PBBFAC,,, | Performed by: OPHTHALMOLOGY

## 2020-11-05 NOTE — PROGRESS NOTES
HPI     2nd opinion on ERM & Dry AMD   ALANNA- 05/12/[facility] visit on   10-    Pt sts Retina  Was ruling out Diabetes, thyroid and had sleep study done and was told she has mild sleep apnea avg about 6 hours of broken sleep now and that is about 1 hour more than   usual. She states OS is extremely blurred and having a lot of difficulty seeing with correction especially with driving at night, was told this is a combination of the retinal issue and cataract but that the cataract is not yet ready for surgery.  Also stated AMD started off in OD and now is in OS and deteriorating rapidly. Sharp /stabbing Pain occasionally OS only, lasts for about a second or two periodically once every so often. Current glasses a few years old unsure when she should fill new rx.  (-)Flashes (+)Floaters stable   (+)Photophobia x 1 yr   (+)Glare difficulty driving at night     Dr. Alexander started her on a regimen of    Areds2 vit QD  DHA QD  Magnesium 3 QAM 3 QHS  CQ10 QD  Carnitine QD       OCT - OD - no ME  OS - ERM with distortion      A/P    1. ERM OS  With decreased Va and MMP  With functional impairment.    Plan 25g PPV/ILM peel/AFx OS for ERM    Local MAC  LOC 30 min    Jovanz case    Risks, benefits, and alternatives to treatment discussed in detail with the patient.  The patient voiced understanding and wished to proceed with the procedure        2. NS OU  Send back to Dr. Pineda when ready

## 2020-11-09 ENCOUNTER — TELEPHONE (OUTPATIENT)
Dept: OPHTHALMOLOGY | Facility: CLINIC | Age: 60
End: 2020-11-09

## 2020-11-09 DIAGNOSIS — H35.372 LEFT EPIRETINAL MEMBRANE: Primary | ICD-10-CM

## 2020-11-16 ENCOUNTER — PATIENT MESSAGE (OUTPATIENT)
Dept: SURGERY | Facility: HOSPITAL | Age: 60
End: 2020-11-16

## 2020-11-18 ENCOUNTER — TELEPHONE (OUTPATIENT)
Dept: SURGERY | Facility: CLINIC | Age: 60
End: 2020-11-18

## 2020-11-18 NOTE — TELEPHONE ENCOUNTER
Returned call to  regarding a referral that was placed in the system by Dr.Katkerine Torres GYN for High Risk Breast Cancer also a Tyrer- Cuzick score of 35.95% . Patient was offered and apt 12/10/20 . Patient stated she was scheduled to have eye surgery soon . That she would call [facility] [place] back after the new year.

## 2020-11-25 DIAGNOSIS — Z01.818 PREOP EXAMINATION: ICD-10-CM

## 2020-12-07 ENCOUNTER — LAB VISIT (OUTPATIENT)
Dept: URGENT CARE | Facility: CLINIC | Age: 60
End: 2020-12-07
Payer: COMMERCIAL

## 2020-12-07 DIAGNOSIS — Z01.818 PREOP EXAMINATION: ICD-10-CM

## 2020-12-07 PROCEDURE — U0003 INFECTIOUS AGENT DETECTION BY NUCLEIC ACID (DNA OR RNA); SEVERE ACUTE RESPIRATORY SYNDROME CORONAVIRUS 2 (SARS-COV-2) (CORONAVIRUS DISEASE [COVID-19]), AMPLIFIED PROBE TECHNIQUE, MAKING USE OF HIGH THROUGHPUT TECHNOLOGIES AS DESCRIBED BY CMS-2020-01-R: HCPCS

## 2020-12-07 NOTE — H&P
Pre-Operative History & Physical  Ophthalmology      SUBJECTIVE:     History of Present Illness:  Patient is a 60 y.o. female presents with Left epiretinal membrane [H35.372].    MEDICATIONS:   No medications prior to admission.       ALLERGIES: Review of patient's allergies indicates:  No Known Allergies    PAST MEDICAL HISTORY:   Past Medical History:   Diagnosis Date    BRCA gene mutation negative     Menopause      PAST SURGICAL HISTORY:   Past Surgical History:   Procedure Laterality Date     SECTION      x 2    COLONOSCOPY N/A 4/10/2019    Procedure: COLONOSCOPY;  Surgeon: Lauren Lopez MD;  Location: [facility];  Service: Endoscopy;  Laterality: N/A;     PAST FAMILY HISTORY:   Family History   Problem Relation Age of Onset    Breast cancer Sister 48    BRCA 1/2 Sister     Breast cancer Paternal Aunt     Colon cancer Neg Hx     Ovarian cancer Neg Hx      SOCIAL HISTORY:   Social History     Tobacco Use    Smoking status: Never Smoker    Smokeless tobacco: Never Used   Substance Use Topics    Alcohol use: Yes     Comment: daily    Drug use: No        MENTAL STATUS: Alert    REVIEW OF SYSTEMS: Negative,except per HPI    OBJECTIVE:     COVID-19 Screening Test:  Test order  Results pending  ASSESSMENT/PLAN:     Patient is a 60 y.o. female with Left epiretinal membrane [H35.372].     - Plan for surgical correction 25g PPV/ILM peel/AFx OS for ERM     Local MAC  LOC 30 min     Suzette case        - Risks/benefits/alternatives of the procedure including, but not limited to scarring, bleeding, infection, loss or decreased vision, and/or need for possible repeat surgery discussed with the patient and family.   - Informed consent obtained prior to surgery and the patient/family voiced good understanding.

## 2020-12-08 ENCOUNTER — TELEPHONE (OUTPATIENT)
Dept: OPHTHALMOLOGY | Facility: CLINIC | Age: 60
End: 2020-12-08

## 2020-12-08 ENCOUNTER — PATIENT MESSAGE (OUTPATIENT)
Dept: FAMILY MEDICINE | Facility: CLINIC | Age: 60
End: 2020-12-08

## 2020-12-08 LAB — SARS-COV-2 RNA RESP QL NAA+PROBE: NOT DETECTED

## 2020-12-08 RX ORDER — TRAZODONE HYDROCHLORIDE 50 MG/1
50 TABLET ORAL NIGHTLY
Qty: 90 TABLET | Refills: 3 | Status: SHIPPED | OUTPATIENT
Start: 2020-12-08 | End: 2021-10-14

## 2020-12-09 ENCOUNTER — ANESTHESIA (OUTPATIENT)
Dept: SURGERY | Facility: HOSPITAL | Age: 60
End: 2020-12-09
Payer: COMMERCIAL

## 2020-12-09 ENCOUNTER — HOSPITAL ENCOUNTER (OUTPATIENT)
Facility: HOSPITAL | Age: 60
Discharge: HOME | End: 2020-12-09
Attending: OPHTHALMOLOGY | Admitting: OPHTHALMOLOGY
Payer: COMMERCIAL

## 2020-12-09 ENCOUNTER — ANESTHESIA EVENT (OUTPATIENT)
Dept: SURGERY | Facility: HOSPITAL | Age: 60
End: 2020-12-09
Payer: COMMERCIAL

## 2020-12-09 VITALS
SYSTOLIC BLOOD PRESSURE: 130 MMHG | HEIGHT: 62 IN | DIASTOLIC BLOOD PRESSURE: 65 MMHG | HEART RATE: 65 BPM | OXYGEN SATURATION: 99 % | BODY MASS INDEX: 24.84 KG/M2 | RESPIRATION RATE: 18 BRPM | TEMPERATURE: 98 F | WEIGHT: 135 LBS

## 2020-12-09 DIAGNOSIS — H35.372 EPIRETINAL MEMBRANE (ERM) OF LEFT EYE: ICD-10-CM

## 2020-12-09 DIAGNOSIS — H35.372 EPIRETINAL MEMBRANE, LEFT: Primary | ICD-10-CM

## 2020-12-09 PROCEDURE — 37000008 HC ANESTHESIA 1ST 15 MINUTES: Performed by: OPHTHALMOLOGY

## 2020-12-09 PROCEDURE — D9220A PRA ANESTHESIA: ICD-10-PCS | Mod: ANES,,, | Performed by: ANESTHESIOLOGY

## 2020-12-09 PROCEDURE — D9220A PRA ANESTHESIA: ICD-10-PCS | Mod: CRNA,,, | Performed by: NURSE ANESTHETIST, CERTIFIED REGISTERED

## 2020-12-09 PROCEDURE — 63600175 PHARM REV CODE 636 W HCPCS: Performed by: OPHTHALMOLOGY

## 2020-12-09 PROCEDURE — 37000009 HC ANESTHESIA EA ADD 15 MINS: Performed by: OPHTHALMOLOGY

## 2020-12-09 PROCEDURE — 27201423 OPTIME MED/SURG SUP & DEVICES STERILE SUPPLY: Performed by: OPHTHALMOLOGY

## 2020-12-09 PROCEDURE — S0020 INJECTION, BUPIVICAINE HYDRO: HCPCS | Performed by: OPHTHALMOLOGY

## 2020-12-09 PROCEDURE — D9220A PRA ANESTHESIA: Mod: ANES,,, | Performed by: ANESTHESIOLOGY

## 2020-12-09 PROCEDURE — 36000706: Performed by: OPHTHALMOLOGY

## 2020-12-09 PROCEDURE — 67042 PR VITRECTOMY PARS PLANA REMOVE INT MEMB RETINA: ICD-10-PCS | Mod: LT,,, | Performed by: OPHTHALMOLOGY

## 2020-12-09 PROCEDURE — 25000003 PHARM REV CODE 250: Performed by: OPHTHALMOLOGY

## 2020-12-09 PROCEDURE — 67042 VIT FOR MACULAR HOLE: CPT | Mod: LT,,, | Performed by: OPHTHALMOLOGY

## 2020-12-09 PROCEDURE — 71000015 HC POSTOP RECOV 1ST HR: Performed by: OPHTHALMOLOGY

## 2020-12-09 PROCEDURE — 36000707: Performed by: OPHTHALMOLOGY

## 2020-12-09 PROCEDURE — 71000044 HC DOSC ROUTINE RECOVERY FIRST HOUR: Performed by: OPHTHALMOLOGY

## 2020-12-09 PROCEDURE — D9220A PRA ANESTHESIA: Mod: CRNA,,, | Performed by: NURSE ANESTHETIST, CERTIFIED REGISTERED

## 2020-12-09 PROCEDURE — 63600175 PHARM REV CODE 636 W HCPCS: Performed by: NURSE ANESTHETIST, CERTIFIED REGISTERED

## 2020-12-09 PROCEDURE — 25000003 PHARM REV CODE 250: Performed by: NURSE ANESTHETIST, CERTIFIED REGISTERED

## 2020-12-09 RX ORDER — MOXIFLOXACIN 5 MG/ML
1 SOLUTION/ DROPS OPHTHALMIC
Status: DISCONTINUED | OUTPATIENT
Start: 2020-12-09 | End: 2021-12-17

## 2020-12-09 RX ORDER — LIDOCAINE HYDROCHLORIDE 20 MG/ML
INJECTION, SOLUTION EPIDURAL; INFILTRATION; INTRACAUDAL; PERINEURAL
Status: DISCONTINUED
Start: 2020-12-09 | End: 2020-12-09 | Stop reason: HOSPADM

## 2020-12-09 RX ORDER — BUPIVACAINE HYDROCHLORIDE 7.5 MG/ML
INJECTION, SOLUTION EPIDURAL; RETROBULBAR
Status: DISCONTINUED
Start: 2020-12-09 | End: 2020-12-09 | Stop reason: HOSPADM

## 2020-12-09 RX ORDER — VANCOMYCIN HYDROCHLORIDE 500 MG/10ML
INJECTION, POWDER, LYOPHILIZED, FOR SOLUTION INTRAVENOUS
Status: DISCONTINUED | OUTPATIENT
Start: 2020-12-09 | End: 2020-12-09 | Stop reason: HOSPADM

## 2020-12-09 RX ORDER — PROPOFOL 10 MG/ML
VIAL (ML) INTRAVENOUS
Status: DISCONTINUED | OUTPATIENT
Start: 2020-12-09 | End: 2020-12-09

## 2020-12-09 RX ORDER — NEOMYCIN SULFATE, POLYMYXIN B SULFATE, AND DEXAMETHASONE 3.5; 10000; 1 MG/G; [USP'U]/G; MG/G
OINTMENT OPHTHALMIC
Status: DISCONTINUED
Start: 2020-12-09 | End: 2020-12-09 | Stop reason: HOSPADM

## 2020-12-09 RX ORDER — VANCOMYCIN HYDROCHLORIDE 500 MG/10ML
INJECTION, POWDER, LYOPHILIZED, FOR SOLUTION INTRAVENOUS
Status: DISCONTINUED
Start: 2020-12-09 | End: 2020-12-09 | Stop reason: HOSPADM

## 2020-12-09 RX ORDER — LIDOCAINE HYDROCHLORIDE 20 MG/ML
INJECTION, SOLUTION EPIDURAL; INFILTRATION; INTRACAUDAL; PERINEURAL
Status: DISCONTINUED | OUTPATIENT
Start: 2020-12-09 | End: 2020-12-09 | Stop reason: HOSPADM

## 2020-12-09 RX ORDER — NEOMYCIN SULFATE, POLYMYXIN B SULFATE, AND DEXAMETHASONE 3.5; 10000; 1 MG/G; [USP'U]/G; MG/G
OINTMENT OPHTHALMIC
Status: DISCONTINUED | OUTPATIENT
Start: 2020-12-09 | End: 2020-12-09 | Stop reason: HOSPADM

## 2020-12-09 RX ORDER — TROPICAMIDE 10 MG/ML
1 SOLUTION/ DROPS OPHTHALMIC
Status: DISCONTINUED | OUTPATIENT
Start: 2020-12-09 | End: 2021-12-17

## 2020-12-09 RX ORDER — PREDNISOLONE ACETATE 10 MG/ML
1 SUSPENSION/ DROPS OPHTHALMIC
Status: DISCONTINUED | OUTPATIENT
Start: 2020-12-09 | End: 2021-12-17

## 2020-12-09 RX ORDER — DIPHENHYDRAMINE HYDROCHLORIDE 50 MG/ML
INJECTION INTRAMUSCULAR; INTRAVENOUS
Status: DISCONTINUED | OUTPATIENT
Start: 2020-12-09 | End: 2020-12-09

## 2020-12-09 RX ORDER — DEXAMETHASONE SODIUM PHOSPHATE 10 MG/ML
INJECTION INTRAMUSCULAR; INTRAVENOUS
Status: DISCONTINUED | OUTPATIENT
Start: 2020-12-09 | End: 2020-12-09 | Stop reason: HOSPADM

## 2020-12-09 RX ORDER — OXYCODONE AND ACETAMINOPHEN 5; 325 MG/1; MG/1
1 TABLET ORAL EVERY 6 HOURS PRN
Qty: 12 TABLET | Refills: 0 | Status: SHIPPED | OUTPATIENT
Start: 2020-12-09 | End: 2021-03-30 | Stop reason: CLARIF

## 2020-12-09 RX ORDER — INDOCYANINE GREEN AND WATER 25 MG
KIT INJECTION
Status: DISCONTINUED | OUTPATIENT
Start: 2020-12-09 | End: 2020-12-09 | Stop reason: HOSPADM

## 2020-12-09 RX ORDER — ONDANSETRON 2 MG/ML
4 INJECTION INTRAMUSCULAR; INTRAVENOUS ONCE AS NEEDED
Status: DISCONTINUED | OUTPATIENT
Start: 2020-12-09 | End: 2020-12-09 | Stop reason: HOSPADM

## 2020-12-09 RX ORDER — ONDANSETRON 8 MG/1
8 TABLET, ORALLY DISINTEGRATING ORAL EVERY 8 HOURS PRN
Status: DISCONTINUED | OUTPATIENT
Start: 2020-12-09 | End: 2020-12-09 | Stop reason: HOSPADM

## 2020-12-09 RX ORDER — HYDROMORPHONE HYDROCHLORIDE 1 MG/ML
0.2 INJECTION, SOLUTION INTRAMUSCULAR; INTRAVENOUS; SUBCUTANEOUS EVERY 5 MIN PRN
Status: DISCONTINUED | OUTPATIENT
Start: 2020-12-09 | End: 2020-12-09 | Stop reason: HOSPADM

## 2020-12-09 RX ORDER — DEXAMETHASONE SODIUM PHOSPHATE 4 MG/ML
INJECTION, SOLUTION INTRA-ARTICULAR; INTRALESIONAL; INTRAMUSCULAR; INTRAVENOUS; SOFT TISSUE
Status: DISCONTINUED
Start: 2020-12-09 | End: 2020-12-09 | Stop reason: HOSPADM

## 2020-12-09 RX ORDER — ATROPINE SULFATE 10 MG/ML
1 SOLUTION/ DROPS OPHTHALMIC
Status: DISCONTINUED | OUTPATIENT
Start: 2020-12-09 | End: 2021-12-17

## 2020-12-09 RX ORDER — HYDROCODONE BITARTRATE AND ACETAMINOPHEN 5; 325 MG/1; MG/1
1 TABLET ORAL EVERY 4 HOURS PRN
Status: DISCONTINUED | OUTPATIENT
Start: 2020-12-09 | End: 2020-12-09 | Stop reason: HOSPADM

## 2020-12-09 RX ORDER — FENTANYL CITRATE 50 UG/ML
25 INJECTION, SOLUTION INTRAMUSCULAR; INTRAVENOUS EVERY 5 MIN PRN
Status: DISCONTINUED | OUTPATIENT
Start: 2020-12-09 | End: 2020-12-09 | Stop reason: HOSPADM

## 2020-12-09 RX ORDER — FENTANYL CITRATE 50 UG/ML
INJECTION, SOLUTION INTRAMUSCULAR; INTRAVENOUS
Status: DISCONTINUED | OUTPATIENT
Start: 2020-12-09 | End: 2020-12-09

## 2020-12-09 RX ORDER — ACETAMINOPHEN 325 MG/1
650 TABLET ORAL EVERY 4 HOURS PRN
Status: DISCONTINUED | OUTPATIENT
Start: 2020-12-09 | End: 2020-12-09 | Stop reason: HOSPADM

## 2020-12-09 RX ORDER — PHENYLEPHRINE HYDROCHLORIDE 25 MG/ML
1 SOLUTION/ DROPS OPHTHALMIC
Status: DISCONTINUED | OUTPATIENT
Start: 2020-12-09 | End: 2021-12-17

## 2020-12-09 RX ORDER — BUPIVACAINE HYDROCHLORIDE 7.5 MG/ML
INJECTION, SOLUTION EPIDURAL; RETROBULBAR
Status: DISCONTINUED | OUTPATIENT
Start: 2020-12-09 | End: 2020-12-09 | Stop reason: HOSPADM

## 2020-12-09 RX ORDER — ONDANSETRON 4 MG/1
4 TABLET, FILM COATED ORAL EVERY 8 HOURS PRN
Qty: 12 TABLET | Refills: 0 | Status: SHIPPED | OUTPATIENT
Start: 2020-12-09 | End: 2021-12-17

## 2020-12-09 RX ORDER — TETRACAINE HYDROCHLORIDE 5 MG/ML
1 SOLUTION OPHTHALMIC
Status: DISCONTINUED | OUTPATIENT
Start: 2020-12-09 | End: 2021-12-17

## 2020-12-09 RX ORDER — DIPHENHYDRAMINE HYDROCHLORIDE 50 MG/ML
25 INJECTION INTRAMUSCULAR; INTRAVENOUS EVERY 6 HOURS PRN
Status: DISCONTINUED | OUTPATIENT
Start: 2020-12-09 | End: 2020-12-09 | Stop reason: HOSPADM

## 2020-12-09 RX ORDER — MIDAZOLAM HYDROCHLORIDE 1 MG/ML
INJECTION, SOLUTION INTRAMUSCULAR; INTRAVENOUS
Status: DISCONTINUED | OUTPATIENT
Start: 2020-12-09 | End: 2020-12-09

## 2020-12-09 RX ORDER — LIDOCAINE HYDROCHLORIDE 20 MG/ML
INJECTION INTRAVENOUS
Status: DISCONTINUED | OUTPATIENT
Start: 2020-12-09 | End: 2020-12-09

## 2020-12-09 RX ADMIN — MIDAZOLAM HYDROCHLORIDE 1 MG: 1 INJECTION, SOLUTION INTRAMUSCULAR; INTRAVENOUS at 09:12

## 2020-12-09 RX ADMIN — MOXIFLOXACIN 1 DROP: 5 SOLUTION/ DROPS OPHTHALMIC at 08:12

## 2020-12-09 RX ADMIN — ATROPINE SULFATE 1 DROP: 10 SOLUTION OPHTHALMIC at 08:12

## 2020-12-09 RX ADMIN — FENTANYL CITRATE 50 MCG: 50 INJECTION, SOLUTION INTRAMUSCULAR; INTRAVENOUS at 09:12

## 2020-12-09 RX ADMIN — LIDOCAINE HYDROCHLORIDE 50 MG: 20 INJECTION, SOLUTION INTRAVENOUS at 09:12

## 2020-12-09 RX ADMIN — TROPICAMIDE 1 DROP: 10 SOLUTION/ DROPS OPHTHALMIC at 08:12

## 2020-12-09 RX ADMIN — PHENYLEPHRINE HYDROCHLORIDE 1 DROP: 25 SOLUTION/ DROPS OPHTHALMIC at 08:12

## 2020-12-09 RX ADMIN — PREDNISOLONE ACETATE 1 DROP: 10 SUSPENSION OPHTHALMIC at 08:12

## 2020-12-09 RX ADMIN — MIDAZOLAM HYDROCHLORIDE 2 MG: 1 INJECTION, SOLUTION INTRAMUSCULAR; INTRAVENOUS at 09:12

## 2020-12-09 RX ADMIN — DIPHENHYDRAMINE HYDROCHLORIDE 12.5 MG: 50 INJECTION INTRAMUSCULAR; INTRAVENOUS at 09:12

## 2020-12-09 RX ADMIN — PROPOFOL 60 MG: 10 INJECTION, EMULSION INTRAVENOUS at 09:12

## 2020-12-09 RX ADMIN — PROPOFOL 40 MG: 10 INJECTION, EMULSION INTRAVENOUS at 09:12

## 2020-12-09 RX ADMIN — TETRACAINE HYDROCHLORIDE 1 DROP: 5 SOLUTION OPHTHALMIC at 08:12

## 2020-12-09 RX ADMIN — SODIUM CHLORIDE, SODIUM GLUCONATE, SODIUM ACETATE, POTASSIUM CHLORIDE, MAGNESIUM CHLORIDE, SODIUM PHOSPHATE, DIBASIC, AND POTASSIUM PHOSPHATE: .53; .5; .37; .037; .03; .012; .00082 INJECTION, SOLUTION INTRAVENOUS at 09:12

## 2020-12-09 NOTE — DISCHARGE SUMMARY
[facility]  Discharge Note  Short Stay    Procedure(s) (LRB):  VITRECTOMY, PARS PLANA APPROACH (Left)    OUTCOME: Patient tolerated treatment/procedure well without complication and is now ready for discharge.    DISPOSITION: Home or Self Care    FINAL DIAGNOSIS:  <principal problem not specified>    FOLLOWUP: In clinic    DISCHARGE INSTRUCTIONS:    Discharge Procedure Orders   Diet general     Lifting restrictions     Call MD for:  temperature >100.4     Call MD for:  persistent nausea and vomiting     Call MD for:  severe uncontrolled pain     Call MD for:  difficulty breathing, headache or visual disturbances     Call MD for:  redness, tenderness, or signs of infection (pain, swelling, redness, odor or green/yellow discharge around incision site)     Call MD for:  hives     Call MD for:  persistent dizziness or light-headedness     Call MD for:  extreme fatigue

## 2020-12-09 NOTE — ANESTHESIA PREPROCEDURE EVALUATION
12/09/2020  Luisa Padilla is a 60 y.o., female.  Patient Active Problem List   Diagnosis    Lumbar strain    Screen for colon cancer    Chronic idiopathic constipation    Epiretinal membrane, left    NS (nuclear sclerosis), bilateral       Anesthesia Evaluation          Review of Systems      Physical Exam   Airway/Jaw/Neck:  Airway Findings: Mouth Opening: Normal Tongue: Normal  General Airway Assessment: Adult  Mallampati: II  Improves to II with phonation.  TM Distance: Normal, at least 6 cm      Dental:  No active dental problems.    Chest/Lungs:  Chest/Lungs Findings: Clear to auscultation     Heart/Vascular:  Heart Findings: Rate: Normal  Rhythm: Regular Rhythm  Sounds: Normal        Mental Status:  Mental Status Findings:  Cooperative, Alert and Oriented         Anesthesia Plan  Type of Anesthesia, risks & benefits discussed:  Anesthesia Type:  MAC  Patient's Preference:   Intra-op Monitoring Plan: standard ASA monitors  Intra-op Monitoring Plan Comments:   Post Op Pain Control Plan:   Post Op Pain Control Plan Comments:   Induction:   IV  Beta Blocker:         Informed Consent: Patient understands risks and agrees with Anesthesia plan.  Questions answered. Anesthesia consent signed with patient.  ASA Score: 2     Day of Surgery Review of History & Physical:        Anesthesia Plan Notes: Chart reviewed, patient interviewed and examined.  The plan for anesthesia for this case was discussed.  Questions were answered and the consent was signed.  Barber Rodriguez M.D.         Ready For Surgery From Anesthesia Perspective.

## 2020-12-09 NOTE — PLAN OF CARE
Patient said she put her allergy eye drops into both eyes this morning at 0615.     Patient prepared for surgery.

## 2020-12-09 NOTE — BRIEF OP NOTE
Pre-Op Dx: ERM OS    Post Op Dx: same    Procedure Performed: 25g PPV/ILM peel/AFx OS    Attending Surgeon: Dinorah    Assistant Surgeon:     Anesthesia: Local/Mac, retrobulbar injection of 4.0cc mixture 0.75%Marcaine, 2% Xylocaine    Estimated blood loss: Minimal    Complication: None    Specimen: None    Disposition: Stable to recovery    Findings/Outcome: significant ERM with traction    Date of Discharge: 12/9/20    Discharge Disposition: stable to recovery then home    F/U: tomorrow

## 2020-12-09 NOTE — TRANSFER OF CARE
"Anesthesia Transfer of Care Note    Patient: Luisa Padilla    Procedure(s) Performed: Procedure(s) (LRB):  VITRECTOMY, PARS PLANA APPROACH (Left)    Patient location: PACU    Anesthesia Type: MAC    Transport from OR: Transported from OR on room air with adequate spontaneous ventilation    Post pain: adequate analgesia    Post assessment: no apparent anesthetic complications    Post vital signs: stable    Level of consciousness: awake    Nausea/Vomiting: no nausea/vomiting    Complications: none    Transfer of care protocol was followed      Last vitals:   Visit Vitals  BP (!) 142/70 (BP Location: Right arm, Patient Position: Lying)   Pulse 65   Temp 36.6 °C (97.9 °F) (Oral)   Resp 18   Ht 5' 2" (1.575 m)   Wt 61.2 kg (135 lb)   SpO2 99%   Breastfeeding No   BMI 24.69 kg/m²     "

## 2020-12-09 NOTE — INTERVAL H&P NOTE
Patient seen and examined at bedside. No updates or changes to H&P. Proceed with surgery as planned.

## 2020-12-09 NOTE — OP NOTE
PREOPERATIVE DIAGNOSIS:  Epiretinal membrane to the Left eye.     POSTOPERATIVE DIAGNOSIS:  Epiretinal membrane to the Left eye.     PROCEDURE PERFORMED:  A 25-gauge pars plana vitrectomy with internal limiting   membrane peel and air-fluid exchange to the Left eye.     ATTENDING SURGEON:  QUINTIN Copeland M.D.     ASSISTANT SURGEON:  Neil RAMIREZ).     ANESTHESIA:  Monitored anesthesia care with a retrobulbar injection of 4.0 mL   mixture of 0.75% Marcaine and 2% Xylocaine.     ESTIMATED BLOOD LOSS:  Minimal.     COMPLICATIONS:  None.     DISPOSITION:  Stable to Recovery.    DOS/DC - 12/9/20     INDICATIONS FOR SURGERY:  This is a 60-year-old who noted progressive   blurriness and distortion of vision, worsening over the last six months.    The patient was found to have a significant epiretinal membrane with macular   traction and decreased vision.  Decision was made to take the patient to surgery   to remove the epiretinal membrane, improve vision, and improve quality of life.    Risks, benefits, and alternatives of surgery were discussed in detail with   risks including loss of vision, loss of eye, retinal detachment, infection,   hemorrhage, cataract formation, lens dislocation, glaucoma, hypotony, ptosis,   and diplopia.  The patient voiced understanding and wished to proceed with the   procedure.     DESCRIPTION OF PROCEDURE:  After proper informed consent was obtained, the   patient was brought back to the Operating Suite at [facility] where   MAC anesthesia was induced.  Retrobulbar injection was provided as above without   complication.  The patient was prepped and draped in normal sterile fashion for   ophthalmic surgery and lid speculum was placed in the left eye.  A standard   three-port 25-gauge pars plana vitrectomy set up with the infusion cannula was   inserted 4.0 mm posterior to the limbus.  The infusion cannula was turned on only   after observed to be free and clear of all  underlying retinal tissue.  The superonasal and superotemporal trochars were also placed 4.0 mm posterior to the limbus.The   vitrector and light pipe were introduced in the vitreous cavity and a core   vitrectomy was performed.  The posterior hyaloid was already elevated, but it   was  out to the level of the vitreous base.  ICG was used to stain the   epiretinal membrane and internal limiting membrane complex, which was peeled off   of the macula with contact lens visualization and ILM forceps.  Scleral   depression was performed 360 degrees to help with removal of the cortical   vitreous.  No identifiable retinal breaks were found.  An air-fluid exchange was   performed.  The trocars were removed from the eye, not leaking after gentle   massage, and the eye was normal pressure via palpation.  Subconjunctival   injections of vancomycin and Decadron were given to the patient.  The drapes   were removed from the patient.  Pt was washed free of Betadine prep solution.    Maxitrol ointment was placed in the left eye.  The eye was patch shielded.  MAC   anesthesia was reversed and she was brought to Recovery Room in stable   condition, tolerating the procedure well.  Dr. Copeland was present for the   entire case.

## 2020-12-09 NOTE — ANESTHESIA POSTPROCEDURE EVALUATION
Anesthesia Post Evaluation    Patient: Luisa Padilla    Procedure(s) Performed: Procedure(s) (LRB):  VITRECTOMY, PARS PLANA APPROACH (Left)    Final Anesthesia Type: MAC    Patient location during evaluation: PACU  Patient participation: Yes- Able to Participate  Level of consciousness: awake and alert  Post-procedure vital signs: reviewed and stable  Pain management: adequate  Airway patency: patent    PONV status at discharge: No PONV  Anesthetic complications: no      Cardiovascular status: hemodynamically stable  Respiratory status: unassisted  Hydration status: euvolemic  Follow-up not needed.          Vitals Value Taken Time   /65 12/09/20 1045   Temp 36.7 °C (98.1 °F) 12/09/20 1045   Pulse 65 12/09/20 1045   Resp 18 12/09/20 1045   SpO2 99 % 12/09/20 1045         No case tracking events are documented in the log.      Pain/Layton Score: Layton Score: 9 (12/9/2020 10:13 AM)

## 2020-12-10 ENCOUNTER — OFFICE VISIT (OUTPATIENT)
Dept: OPHTHALMOLOGY | Facility: CLINIC | Age: 60
End: 2020-12-10
Payer: COMMERCIAL

## 2020-12-10 DIAGNOSIS — H25.13 NS (NUCLEAR SCLEROSIS), BILATERAL: ICD-10-CM

## 2020-12-10 DIAGNOSIS — H35.372 EPIRETINAL MEMBRANE (ERM) OF LEFT EYE: Primary | ICD-10-CM

## 2020-12-10 PROCEDURE — 99024 PR POST-OP FOLLOW-UP VISIT: ICD-10-PCS | Mod: S$GLB,,, | Performed by: OPHTHALMOLOGY

## 2020-12-10 PROCEDURE — 99024 POSTOP FOLLOW-UP VISIT: CPT | Mod: S$GLB,,, | Performed by: OPHTHALMOLOGY

## 2020-12-10 PROCEDURE — 99999 PR PBB SHADOW E&M-EST. PATIENT-LVL III: ICD-10-PCS | Mod: PBBFAC,,, | Performed by: OPHTHALMOLOGY

## 2020-12-10 PROCEDURE — 99999 PR PBB SHADOW E&M-EST. PATIENT-LVL III: CPT | Mod: PBBFAC,,, | Performed by: OPHTHALMOLOGY

## 2020-12-10 NOTE — PROGRESS NOTES
HPI     1 day PO   25g PPV/ILM peel/AFx OS    Pt sts no problems just discomfort and wet feeling     Last edited by Donna Colby on 12/10/2020  8:27 AM. (History)          Prior  OCT - OD - no ME  OS - ERM with distortion      A/P    1. ERM OS  With decreased Va and MMP  With functional impairment.    S/p  25g PPV/ILM peel/AFx OS for ERM 12/9/20    Doing well, good IOP  Start gtts QID  Ointment/shield QHS        2. NS OU  Send back to Dr. Pineda when ready

## 2020-12-15 ENCOUNTER — OFFICE VISIT (OUTPATIENT)
Dept: OPHTHALMOLOGY | Facility: CLINIC | Age: 60
End: 2020-12-15
Payer: COMMERCIAL

## 2020-12-15 DIAGNOSIS — H35.372 EPIRETINAL MEMBRANE (ERM) OF LEFT EYE: Primary | ICD-10-CM

## 2020-12-15 PROCEDURE — 99999 PR PBB SHADOW E&M-EST. PATIENT-LVL II: ICD-10-PCS | Mod: PBBFAC,,, | Performed by: OPHTHALMOLOGY

## 2020-12-15 PROCEDURE — 99999 PR PBB SHADOW E&M-EST. PATIENT-LVL II: CPT | Mod: PBBFAC,,, | Performed by: OPHTHALMOLOGY

## 2020-12-15 PROCEDURE — 99024 POSTOP FOLLOW-UP VISIT: CPT | Mod: S$GLB,,, | Performed by: OPHTHALMOLOGY

## 2020-12-15 PROCEDURE — 99024 PR POST-OP FOLLOW-UP VISIT: ICD-10-PCS | Mod: S$GLB,,, | Performed by: OPHTHALMOLOGY

## 2020-12-15 NOTE — PROGRESS NOTES
HPI     1 wk PO   25g PPV/ILM peel/AFx OS    Pt sts bubble going down blurry vision but clearer.   Occasional pain mostly itching and burning worse with ointment use   4 Stationary floaters in vision OS since surgery wondering if that is   normal   PF HA VIG QID OS     Last edited by Donna Colby on 12/15/2020  3:09 PM. (History)          Prior  OCT - OD - no ME  OS - ERM with distortion      A/P    1. ERM OS  With decreased Va and MMP  With functional impairment.    S/p  25g PPV/ILM peel/AFx OS for ERM 12/9/20    Doing well, good IOP    Taper PF 3/2/1/0        2. NS OU  Send back to Dr. Pineda when ready      1 month OCT

## 2020-12-22 ENCOUNTER — PATIENT MESSAGE (OUTPATIENT)
Dept: OPHTHALMOLOGY | Facility: CLINIC | Age: 60
End: 2020-12-22

## 2021-01-19 ENCOUNTER — OFFICE VISIT (OUTPATIENT)
Dept: OPHTHALMOLOGY | Facility: CLINIC | Age: 61
End: 2021-01-19
Payer: COMMERCIAL

## 2021-01-19 DIAGNOSIS — H35.372 EPIRETINAL MEMBRANE (ERM) OF LEFT EYE: ICD-10-CM

## 2021-01-19 DIAGNOSIS — H25.13 NS (NUCLEAR SCLEROSIS), BILATERAL: Primary | ICD-10-CM

## 2021-01-19 PROCEDURE — 99999 PR PBB SHADOW E&M-EST. PATIENT-LVL III: ICD-10-PCS | Mod: PBBFAC,,, | Performed by: OPHTHALMOLOGY

## 2021-01-19 PROCEDURE — 99024 PR POST-OP FOLLOW-UP VISIT: ICD-10-PCS | Mod: S$GLB,,, | Performed by: OPHTHALMOLOGY

## 2021-01-19 PROCEDURE — 99999 PR PBB SHADOW E&M-EST. PATIENT-LVL III: CPT | Mod: PBBFAC,,, | Performed by: OPHTHALMOLOGY

## 2021-01-19 PROCEDURE — 99024 POSTOP FOLLOW-UP VISIT: CPT | Mod: S$GLB,,, | Performed by: OPHTHALMOLOGY

## 2021-03-11 ENCOUNTER — IMMUNIZATION (OUTPATIENT)
Dept: PHARMACY | Facility: CLINIC | Age: 61
End: 2021-03-11
Payer: COMMERCIAL

## 2021-03-11 DIAGNOSIS — Z23 NEED FOR VACCINATION: Primary | ICD-10-CM

## 2021-04-01 ENCOUNTER — OFFICE VISIT (OUTPATIENT)
Dept: FAMILY MEDICINE | Facility: CLINIC | Age: 61
End: 2021-04-01
Payer: COMMERCIAL

## 2021-04-01 ENCOUNTER — LAB VISIT (OUTPATIENT)
Dept: LAB | Facility: HOSPITAL | Age: 61
End: 2021-04-01
Attending: INTERNAL MEDICINE
Payer: COMMERCIAL

## 2021-04-01 ENCOUNTER — TELEPHONE (OUTPATIENT)
Dept: FAMILY MEDICINE | Facility: CLINIC | Age: 61
End: 2021-04-01

## 2021-04-01 VITALS
OXYGEN SATURATION: 99 % | TEMPERATURE: 98 F | BODY MASS INDEX: 26.29 KG/M2 | HEIGHT: 62 IN | SYSTOLIC BLOOD PRESSURE: 126 MMHG | HEART RATE: 70 BPM | DIASTOLIC BLOOD PRESSURE: 72 MMHG | WEIGHT: 142.88 LBS

## 2021-04-01 DIAGNOSIS — G47.00 INSOMNIA, UNSPECIFIED TYPE: ICD-10-CM

## 2021-04-01 DIAGNOSIS — Z12.31 ENCOUNTER FOR SCREENING MAMMOGRAM FOR MALIGNANT NEOPLASM OF BREAST: ICD-10-CM

## 2021-04-01 DIAGNOSIS — R73.03 PREDIABETES: ICD-10-CM

## 2021-04-01 DIAGNOSIS — Z00.00 ANNUAL PHYSICAL EXAM: ICD-10-CM

## 2021-04-01 DIAGNOSIS — Z11.59 ENCOUNTER FOR HEPATITIS C SCREENING TEST FOR LOW RISK PATIENT: ICD-10-CM

## 2021-04-01 DIAGNOSIS — Z00.00 ANNUAL PHYSICAL EXAM: Primary | ICD-10-CM

## 2021-04-01 DIAGNOSIS — E78.00 HYPERCHOLESTEROLEMIA: Primary | ICD-10-CM

## 2021-04-01 DIAGNOSIS — Z11.4 ENCOUNTER FOR SCREENING FOR HUMAN IMMUNODEFICIENCY VIRUS (HIV): ICD-10-CM

## 2021-04-01 DIAGNOSIS — Z23 NEED FOR TD VACCINE: ICD-10-CM

## 2021-04-01 LAB
ALBUMIN SERPL BCP-MCNC: 4.3 G/DL (ref 3.5–5.2)
ALP SERPL-CCNC: 65 U/L (ref 55–135)
ALT SERPL W/O P-5'-P-CCNC: 30 U/L (ref 10–44)
ANION GAP SERPL CALC-SCNC: 9 MMOL/L (ref 8–16)
AST SERPL-CCNC: 25 U/L (ref 10–40)
BASOPHILS # BLD AUTO: 0.05 K/UL (ref 0–0.2)
BASOPHILS NFR BLD: 1.3 % (ref 0–1.9)
BILIRUB SERPL-MCNC: 0.5 MG/DL (ref 0.1–1)
BUN SERPL-MCNC: 17 MG/DL (ref 8–23)
CALCIUM SERPL-MCNC: 9.1 MG/DL (ref 8.7–10.5)
CHLORIDE SERPL-SCNC: 106 MMOL/L (ref 95–110)
CHOLEST SERPL-MCNC: 236 MG/DL (ref 120–199)
CHOLEST/HDLC SERPL: 3.1 {RATIO} (ref 2–5)
CO2 SERPL-SCNC: 26 MMOL/L (ref 23–29)
CREAT SERPL-MCNC: 0.8 MG/DL (ref 0.5–1.4)
DIFFERENTIAL METHOD: ABNORMAL
EOSINOPHIL # BLD AUTO: 0.1 K/UL (ref 0–0.5)
EOSINOPHIL NFR BLD: 3.5 % (ref 0–8)
ERYTHROCYTE [DISTWIDTH] IN BLOOD BY AUTOMATED COUNT: 14.7 % (ref 11.5–14.5)
EST. GFR  (AFRICAN AMERICAN): >60 ML/MIN/1.73 M^2
EST. GFR  (NON AFRICAN AMERICAN): >60 ML/MIN/1.73 M^2
ESTIMATED AVG GLUCOSE: 117 MG/DL (ref 68–131)
GLUCOSE SERPL-MCNC: 103 MG/DL (ref 70–110)
HBA1C MFR BLD: 5.7 % (ref 4–5.6)
HCT VFR BLD AUTO: 41.7 % (ref 37–48.5)
HDLC SERPL-MCNC: 76 MG/DL (ref 40–75)
HDLC SERPL: 32.2 % (ref 20–50)
HGB BLD-MCNC: 13.6 G/DL (ref 12–16)
IMM GRANULOCYTES # BLD AUTO: 0.02 K/UL (ref 0–0.04)
IMM GRANULOCYTES NFR BLD AUTO: 0.5 % (ref 0–0.5)
LDLC SERPL CALC-MCNC: 131 MG/DL (ref 63–159)
LYMPHOCYTES # BLD AUTO: 1.3 K/UL (ref 1–4.8)
LYMPHOCYTES NFR BLD: 32.6 % (ref 18–48)
MCH RBC QN AUTO: 30.2 PG (ref 27–31)
MCHC RBC AUTO-ENTMCNC: 32.6 G/DL (ref 32–36)
MCV RBC AUTO: 93 FL (ref 82–98)
MONOCYTES # BLD AUTO: 0.4 K/UL (ref 0.3–1)
MONOCYTES NFR BLD: 10 % (ref 4–15)
NEUTROPHILS # BLD AUTO: 2.1 K/UL (ref 1.8–7.7)
NEUTROPHILS NFR BLD: 52.1 % (ref 38–73)
NONHDLC SERPL-MCNC: 160 MG/DL
NRBC BLD-RTO: 0 /100 WBC
PLATELET # BLD AUTO: 192 K/UL (ref 150–450)
PMV BLD AUTO: 10.2 FL (ref 9.2–12.9)
POTASSIUM SERPL-SCNC: 4.5 MMOL/L (ref 3.5–5.1)
PROT SERPL-MCNC: 7.6 G/DL (ref 6–8.4)
RBC # BLD AUTO: 4.51 M/UL (ref 4–5.4)
SODIUM SERPL-SCNC: 141 MMOL/L (ref 136–145)
TRIGL SERPL-MCNC: 145 MG/DL (ref 30–150)
TSH SERPL DL<=0.005 MIU/L-ACNC: 1.01 UIU/ML (ref 0.4–4)
WBC # BLD AUTO: 3.99 K/UL (ref 3.9–12.7)

## 2021-04-01 PROCEDURE — 83036 HEMOGLOBIN GLYCOSYLATED A1C: CPT | Performed by: INTERNAL MEDICINE

## 2021-04-01 PROCEDURE — 90471 IMMUNIZATION ADMIN: CPT | Mod: S$GLB,,, | Performed by: INTERNAL MEDICINE

## 2021-04-01 PROCEDURE — 90471 TDAP VACCINE GREATER THAN OR EQUAL TO 7YO IM: ICD-10-PCS | Mod: S$GLB,,, | Performed by: INTERNAL MEDICINE

## 2021-04-01 PROCEDURE — 80053 COMPREHEN METABOLIC PANEL: CPT | Performed by: INTERNAL MEDICINE

## 2021-04-01 PROCEDURE — 99999 PR PBB SHADOW E&M-EST. PATIENT-LVL V: ICD-10-PCS | Mod: PBBFAC,,, | Performed by: INTERNAL MEDICINE

## 2021-04-01 PROCEDURE — 36415 COLL VENOUS BLD VENIPUNCTURE: CPT | Performed by: INTERNAL MEDICINE

## 2021-04-01 PROCEDURE — 90715 TDAP VACCINE 7 YRS/> IM: CPT | Mod: S$GLB,,, | Performed by: INTERNAL MEDICINE

## 2021-04-01 PROCEDURE — 86803 HEPATITIS C AB TEST: CPT | Performed by: INTERNAL MEDICINE

## 2021-04-01 PROCEDURE — 85025 COMPLETE CBC W/AUTO DIFF WBC: CPT | Performed by: INTERNAL MEDICINE

## 2021-04-01 PROCEDURE — 90715 TDAP VACCINE GREATER THAN OR EQUAL TO 7YO IM: ICD-10-PCS | Mod: S$GLB,,, | Performed by: INTERNAL MEDICINE

## 2021-04-01 PROCEDURE — 99214 OFFICE O/P EST MOD 30 MIN: CPT | Mod: 25,S$GLB,, | Performed by: INTERNAL MEDICINE

## 2021-04-01 PROCEDURE — 84443 ASSAY THYROID STIM HORMONE: CPT | Performed by: INTERNAL MEDICINE

## 2021-04-01 PROCEDURE — 82306 VITAMIN D 25 HYDROXY: CPT | Performed by: INTERNAL MEDICINE

## 2021-04-01 PROCEDURE — 99214 PR OFFICE/OUTPT VISIT, EST, LEVL IV, 30-39 MIN: ICD-10-PCS | Mod: 25,S$GLB,, | Performed by: INTERNAL MEDICINE

## 2021-04-01 PROCEDURE — 86703 HIV-1/HIV-2 1 RESULT ANTBDY: CPT | Performed by: INTERNAL MEDICINE

## 2021-04-01 PROCEDURE — 80061 LIPID PANEL: CPT | Performed by: INTERNAL MEDICINE

## 2021-04-01 PROCEDURE — 99999 PR PBB SHADOW E&M-EST. PATIENT-LVL V: CPT | Mod: PBBFAC,,, | Performed by: INTERNAL MEDICINE

## 2021-04-02 ENCOUNTER — PATIENT MESSAGE (OUTPATIENT)
Dept: FAMILY MEDICINE | Facility: CLINIC | Age: 61
End: 2021-04-02

## 2021-04-02 LAB
25(OH)D3+25(OH)D2 SERPL-MCNC: 18 NG/ML (ref 30–96)
HCV AB SERPL QL IA: NEGATIVE
HIV 1+2 AB+HIV1 P24 AG SERPL QL IA: NEGATIVE

## 2021-04-02 RX ORDER — VIT C/E/ZN/COPPR/LUTEIN/ZEAXAN 250MG-90MG
1000 CAPSULE ORAL DAILY
Qty: 30 CAPSULE | Refills: 11 | Status: SHIPPED | OUTPATIENT
Start: 2021-04-02

## 2021-04-05 ENCOUNTER — TELEPHONE (OUTPATIENT)
Dept: FAMILY MEDICINE | Facility: CLINIC | Age: 61
End: 2021-04-05

## 2021-04-22 ENCOUNTER — OFFICE VISIT (OUTPATIENT)
Dept: OPHTHALMOLOGY | Facility: CLINIC | Age: 61
End: 2021-04-22
Payer: COMMERCIAL

## 2021-04-22 DIAGNOSIS — H35.372 EPIRETINAL MEMBRANE, LEFT: Primary | ICD-10-CM

## 2021-04-22 DIAGNOSIS — H25.13 NS (NUCLEAR SCLEROSIS), BILATERAL: ICD-10-CM

## 2021-04-22 DIAGNOSIS — H35.372 EPIRETINAL MEMBRANE (ERM) OF LEFT EYE: ICD-10-CM

## 2021-04-22 PROCEDURE — 92134 CPTRZ OPH DX IMG PST SGM RTA: CPT | Mod: S$GLB,,, | Performed by: OPHTHALMOLOGY

## 2021-04-22 PROCEDURE — 92201 OPSCPY EXTND RTA DRAW UNI/BI: CPT | Mod: S$GLB,,, | Performed by: OPHTHALMOLOGY

## 2021-04-22 PROCEDURE — 92014 PR EYE EXAM, EST PATIENT,COMPREHESV: ICD-10-PCS | Mod: S$GLB,,, | Performed by: OPHTHALMOLOGY

## 2021-04-22 PROCEDURE — 92134 POSTERIOR SEGMENT OCT RETINA (OCULAR COHERENCE TOMOGRAPHY)-BOTH EYES: ICD-10-PCS | Mod: S$GLB,,, | Performed by: OPHTHALMOLOGY

## 2021-04-22 PROCEDURE — 99999 PR PBB SHADOW E&M-EST. PATIENT-LVL III: ICD-10-PCS | Mod: PBBFAC,,, | Performed by: OPHTHALMOLOGY

## 2021-04-22 PROCEDURE — 92201 PR OPHTHALMOSCOPY, EXT, W/RET DRAW/SCLERAL DEPR, I&R, UNI/BI: ICD-10-PCS | Mod: S$GLB,,, | Performed by: OPHTHALMOLOGY

## 2021-04-22 PROCEDURE — 92014 COMPRE OPH EXAM EST PT 1/>: CPT | Mod: S$GLB,,, | Performed by: OPHTHALMOLOGY

## 2021-04-22 PROCEDURE — 99999 PR PBB SHADOW E&M-EST. PATIENT-LVL III: CPT | Mod: PBBFAC,,, | Performed by: OPHTHALMOLOGY

## 2021-05-21 ENCOUNTER — APPOINTMENT (OUTPATIENT)
Dept: RADIOLOGY | Facility: OTHER | Age: 61
End: 2021-05-21
Attending: OBSTETRICS & GYNECOLOGY
Payer: COMMERCIAL

## 2021-05-21 VITALS — BODY MASS INDEX: 26.29 KG/M2 | HEIGHT: 62 IN | WEIGHT: 142.88 LBS

## 2021-05-21 DIAGNOSIS — Z12.31 ENCOUNTER FOR SCREENING MAMMOGRAM FOR BREAST CANCER: ICD-10-CM

## 2021-05-21 PROCEDURE — 77063 BREAST TOMOSYNTHESIS BI: CPT | Mod: 26,,, | Performed by: RADIOLOGY

## 2021-05-21 PROCEDURE — 77067 SCR MAMMO BI INCL CAD: CPT | Mod: 26,,, | Performed by: RADIOLOGY

## 2021-05-21 PROCEDURE — 77067 MAMMO DIGITAL SCREENING BILAT WITH TOMOSYNTHESIS_CAD: ICD-10-PCS | Mod: 26,,, | Performed by: RADIOLOGY

## 2021-05-21 PROCEDURE — 77067 SCR MAMMO BI INCL CAD: CPT | Mod: TC,PN

## 2021-05-21 PROCEDURE — 77063 MAMMO DIGITAL SCREENING BILAT WITH TOMOSYNTHESIS_CAD: ICD-10-PCS | Mod: 26,,, | Performed by: RADIOLOGY

## 2021-08-11 ENCOUNTER — TELEPHONE (OUTPATIENT)
Dept: OPHTHALMOLOGY | Facility: CLINIC | Age: 61
End: 2021-08-11

## 2021-10-01 ENCOUNTER — TELEPHONE (OUTPATIENT)
Dept: PAIN MEDICINE | Facility: CLINIC | Age: 61
End: 2021-10-01

## 2021-10-17 ENCOUNTER — PATIENT MESSAGE (OUTPATIENT)
Dept: FAMILY MEDICINE | Facility: CLINIC | Age: 61
End: 2021-10-17

## 2021-10-19 RX ORDER — TRAZODONE HYDROCHLORIDE 50 MG/1
50 TABLET ORAL NIGHTLY
Qty: 90 TABLET | Refills: 2 | Status: SHIPPED | OUTPATIENT
Start: 2021-10-19 | End: 2022-05-06 | Stop reason: SDUPTHER

## 2021-10-20 ENCOUNTER — PATIENT OUTREACH (OUTPATIENT)
Dept: ADMINISTRATIVE | Facility: OTHER | Age: 61
End: 2021-10-20

## 2021-10-21 ENCOUNTER — TELEPHONE (OUTPATIENT)
Dept: DERMATOLOGY | Facility: CLINIC | Age: 61
End: 2021-10-21

## 2021-10-21 ENCOUNTER — OFFICE VISIT (OUTPATIENT)
Dept: DERMATOLOGY | Facility: CLINIC | Age: 61
End: 2021-10-21
Payer: COMMERCIAL

## 2021-10-21 ENCOUNTER — PATIENT MESSAGE (OUTPATIENT)
Dept: DERMATOLOGY | Facility: CLINIC | Age: 61
End: 2021-10-21

## 2021-10-21 DIAGNOSIS — L30.9 DERMATITIS: ICD-10-CM

## 2021-10-21 DIAGNOSIS — L30.9 DERMATITIS: Primary | ICD-10-CM

## 2021-10-21 PROCEDURE — 3044F HG A1C LEVEL LT 7.0%: CPT | Mod: CPTII,95,, | Performed by: DERMATOLOGY

## 2021-10-21 PROCEDURE — 3044F PR MOST RECENT HEMOGLOBIN A1C LEVEL <7.0%: ICD-10-PCS | Mod: CPTII,95,, | Performed by: DERMATOLOGY

## 2021-10-21 PROCEDURE — 1159F PR MEDICATION LIST DOCUMENTED IN MEDICAL RECORD: ICD-10-PCS | Mod: CPTII,95,, | Performed by: DERMATOLOGY

## 2021-10-21 PROCEDURE — 99203 OFFICE O/P NEW LOW 30 MIN: CPT | Mod: 95,,, | Performed by: DERMATOLOGY

## 2021-10-21 PROCEDURE — 99203 PR OFFICE/OUTPT VISIT, NEW, LEVL III, 30-44 MIN: ICD-10-PCS | Mod: 95,,, | Performed by: DERMATOLOGY

## 2021-10-21 PROCEDURE — 1160F RVW MEDS BY RX/DR IN RCRD: CPT | Mod: CPTII,95,, | Performed by: DERMATOLOGY

## 2021-10-21 PROCEDURE — 1160F PR REVIEW ALL MEDS BY PRESCRIBER/CLIN PHARMACIST DOCUMENTED: ICD-10-PCS | Mod: CPTII,95,, | Performed by: DERMATOLOGY

## 2021-10-21 PROCEDURE — 1159F MED LIST DOCD IN RCRD: CPT | Mod: CPTII,95,, | Performed by: DERMATOLOGY

## 2021-10-21 RX ORDER — BETAMETHASONE DIPROPIONATE 0.5 MG/G
LOTION TOPICAL
Qty: 60 ML | Refills: 1 | Status: SHIPPED | OUTPATIENT
Start: 2021-10-21 | End: 2021-10-25

## 2021-10-25 ENCOUNTER — PATIENT MESSAGE (OUTPATIENT)
Dept: DERMATOLOGY | Facility: CLINIC | Age: 61
End: 2021-10-25
Payer: COMMERCIAL

## 2021-10-25 RX ORDER — BETAMETHASONE DIPROPIONATE 0.5 MG/G
LOTION TOPICAL
Qty: 60 ML | Refills: 1 | Status: SHIPPED | OUTPATIENT
Start: 2021-10-25 | End: 2021-12-17

## 2021-10-26 ENCOUNTER — PATIENT MESSAGE (OUTPATIENT)
Dept: DERMATOLOGY | Facility: CLINIC | Age: 61
End: 2021-10-26
Payer: COMMERCIAL

## 2021-11-04 ENCOUNTER — IMMUNIZATION (OUTPATIENT)
Dept: PRIMARY CARE CLINIC | Facility: CLINIC | Age: 61
End: 2021-11-04
Payer: COMMERCIAL

## 2021-11-04 DIAGNOSIS — Z23 NEED FOR VACCINATION: Primary | ICD-10-CM

## 2021-11-04 PROCEDURE — 0064A COVID-19, MRNA, LNP-S, PF, 100 MCG/0.25 ML DOSE VACCINE (MODERNA BOOSTER): CPT | Mod: CV19,PBBFAC | Performed by: INTERNAL MEDICINE

## 2021-11-15 ENCOUNTER — PATIENT MESSAGE (OUTPATIENT)
Dept: DERMATOLOGY | Facility: CLINIC | Age: 61
End: 2021-11-15
Payer: COMMERCIAL

## 2021-11-22 ENCOUNTER — PATIENT OUTREACH (OUTPATIENT)
Dept: ADMINISTRATIVE | Facility: OTHER | Age: 61
End: 2021-11-22
Payer: COMMERCIAL

## 2021-11-24 ENCOUNTER — OFFICE VISIT (OUTPATIENT)
Dept: DERMATOLOGY | Facility: CLINIC | Age: 61
End: 2021-11-24
Payer: COMMERCIAL

## 2021-11-24 DIAGNOSIS — D48.5 NEOPLASM OF UNCERTAIN BEHAVIOR OF SKIN: Primary | ICD-10-CM

## 2021-11-24 DIAGNOSIS — L81.4 LENTIGO: ICD-10-CM

## 2021-11-24 DIAGNOSIS — D18.01 CHERRY ANGIOMA: ICD-10-CM

## 2021-11-24 DIAGNOSIS — L82.1 SK (SEBORRHEIC KERATOSIS): ICD-10-CM

## 2021-11-24 DIAGNOSIS — L30.9 DERMATITIS: ICD-10-CM

## 2021-11-24 PROCEDURE — 88305 TISSUE EXAM BY PATHOLOGIST: CPT | Performed by: PATHOLOGY

## 2021-11-24 PROCEDURE — 11102 TANGNTL BX SKIN SINGLE LES: CPT | Mod: S$GLB,,, | Performed by: DERMATOLOGY

## 2021-11-24 PROCEDURE — 88342 CHG IMMUNOCYTOCHEMISTRY: ICD-10-PCS | Mod: 26,,, | Performed by: PATHOLOGY

## 2021-11-24 PROCEDURE — 99999 PR PBB SHADOW E&M-EST. PATIENT-LVL III: CPT | Mod: PBBFAC,,, | Performed by: DERMATOLOGY

## 2021-11-24 PROCEDURE — 88342 IMHCHEM/IMCYTCHM 1ST ANTB: CPT | Mod: 26,,, | Performed by: PATHOLOGY

## 2021-11-24 PROCEDURE — 99213 OFFICE O/P EST LOW 20 MIN: CPT | Mod: 25,S$GLB,, | Performed by: DERMATOLOGY

## 2021-11-24 PROCEDURE — 11102 PR TANGENTIAL BIOPSY, SKIN, SINGLE LESION: ICD-10-PCS | Mod: S$GLB,,, | Performed by: DERMATOLOGY

## 2021-11-24 PROCEDURE — 88305 TISSUE EXAM BY PATHOLOGIST: CPT | Mod: 26,,, | Performed by: PATHOLOGY

## 2021-11-24 PROCEDURE — 88305 TISSUE EXAM BY PATHOLOGIST: ICD-10-PCS | Mod: 26,,, | Performed by: PATHOLOGY

## 2021-11-24 PROCEDURE — 99213 PR OFFICE/OUTPT VISIT, EST, LEVL III, 20-29 MIN: ICD-10-PCS | Mod: 25,S$GLB,, | Performed by: DERMATOLOGY

## 2021-11-24 PROCEDURE — 99999 PR PBB SHADOW E&M-EST. PATIENT-LVL III: ICD-10-PCS | Mod: PBBFAC,,, | Performed by: DERMATOLOGY

## 2021-11-24 PROCEDURE — 88342 IMHCHEM/IMCYTCHM 1ST ANTB: CPT | Performed by: PATHOLOGY

## 2021-11-24 PROCEDURE — 88341 PR IHC OR ICC EACH ADD'L SINGLE ANTIBODY  STAINPR: ICD-10-PCS | Mod: 26,,, | Performed by: PATHOLOGY

## 2021-11-24 PROCEDURE — 88341 IMHCHEM/IMCYTCHM EA ADD ANTB: CPT | Mod: 26,,, | Performed by: PATHOLOGY

## 2021-11-24 PROCEDURE — 88341 IMHCHEM/IMCYTCHM EA ADD ANTB: CPT | Performed by: PATHOLOGY

## 2021-12-01 ENCOUNTER — PATIENT MESSAGE (OUTPATIENT)
Dept: FAMILY MEDICINE | Facility: CLINIC | Age: 61
End: 2021-12-01
Payer: COMMERCIAL

## 2021-12-02 LAB
FINAL PATHOLOGIC DIAGNOSIS: NORMAL
GROSS: NORMAL
Lab: NORMAL
MICROSCOPIC EXAM: NORMAL

## 2021-12-17 ENCOUNTER — OFFICE VISIT (OUTPATIENT)
Dept: INTERNAL MEDICINE | Facility: CLINIC | Age: 61
End: 2021-12-17
Payer: COMMERCIAL

## 2021-12-17 VITALS
HEART RATE: 66 BPM | TEMPERATURE: 98 F | BODY MASS INDEX: 25.76 KG/M2 | OXYGEN SATURATION: 99 % | SYSTOLIC BLOOD PRESSURE: 116 MMHG | DIASTOLIC BLOOD PRESSURE: 68 MMHG | WEIGHT: 140 LBS | HEIGHT: 62 IN

## 2021-12-17 DIAGNOSIS — F51.01 PRIMARY INSOMNIA: ICD-10-CM

## 2021-12-17 DIAGNOSIS — Z83.49 FAMILY HISTORY OF THYROID DISEASE: ICD-10-CM

## 2021-12-17 DIAGNOSIS — E78.2 MIXED HYPERLIPIDEMIA: ICD-10-CM

## 2021-12-17 DIAGNOSIS — Z00.00 ANNUAL PHYSICAL EXAM: Primary | ICD-10-CM

## 2021-12-17 DIAGNOSIS — R73.03 PREDIABETES: ICD-10-CM

## 2021-12-17 DIAGNOSIS — E55.9 VITAMIN D DEFICIENCY: ICD-10-CM

## 2021-12-17 DIAGNOSIS — E66.3 OVERWEIGHT: ICD-10-CM

## 2021-12-17 PROBLEM — Z12.11 SCREEN FOR COLON CANCER: Status: RESOLVED | Noted: 2019-03-11 | Resolved: 2021-12-17

## 2021-12-17 PROBLEM — K59.04 CHRONIC IDIOPATHIC CONSTIPATION: Status: RESOLVED | Noted: 2019-03-11 | Resolved: 2021-12-17

## 2021-12-17 PROBLEM — H35.372 EPIRETINAL MEMBRANE, LEFT: Status: RESOLVED | Noted: 2020-11-05 | Resolved: 2021-12-17

## 2021-12-17 PROCEDURE — 99999 PR PBB SHADOW E&M-EST. PATIENT-LVL III: ICD-10-PCS | Mod: PBBFAC,,, | Performed by: INTERNAL MEDICINE

## 2021-12-17 PROCEDURE — 99396 PR PREVENTIVE VISIT,EST,40-64: ICD-10-PCS | Mod: S$GLB,,, | Performed by: INTERNAL MEDICINE

## 2021-12-17 PROCEDURE — 99396 PREV VISIT EST AGE 40-64: CPT | Mod: S$GLB,,, | Performed by: INTERNAL MEDICINE

## 2021-12-17 PROCEDURE — 99999 PR PBB SHADOW E&M-EST. PATIENT-LVL III: CPT | Mod: PBBFAC,,, | Performed by: INTERNAL MEDICINE

## 2021-12-21 ENCOUNTER — TELEPHONE (OUTPATIENT)
Dept: INTERNAL MEDICINE | Facility: CLINIC | Age: 61
End: 2021-12-21
Payer: COMMERCIAL

## 2021-12-21 DIAGNOSIS — E78.2 MIXED HYPERLIPIDEMIA: ICD-10-CM

## 2021-12-21 DIAGNOSIS — E55.9 VITAMIN D DEFICIENCY: ICD-10-CM

## 2021-12-21 DIAGNOSIS — R73.03 PREDIABETES: ICD-10-CM

## 2021-12-21 DIAGNOSIS — Z00.00 ANNUAL PHYSICAL EXAM: ICD-10-CM

## 2021-12-29 LAB
25(OH)D3 SERPL-MCNC: 28 NG/ML (ref 30–100)
ALBUMIN: 4.5 GRAM/DL (ref 3.5–5)
ALP SERPL-CCNC: 62 UNIT/L (ref 38–126)
ALT SERPL W P-5'-P-CCNC: 34 UNIT/L (ref 7–56)
ANION GAP SERPL CALC-SCNC: 16 MEQ/L (ref 9–18)
AST SERPL-CCNC: 28 UNIT/L (ref 7–40)
BASOPHILS ABSOLUTE COUNT: 0 K/UL (ref 0–0.2)
BASOPHILS NFR BLD: 0.9 % (ref 0–2)
BILIRUB SERPL-MCNC: 0.4 MG/DL (ref 0–1.2)
BUN BLD-MCNC: 17 MG/DL (ref 7–21)
BUN/CREAT SERPL: 24 RATIO (ref 6–22)
CALC OSMOLALITY: 285 MOSM/KG (ref 275–295)
CALCIUM SERPL-MCNC: 9.2 MG/DL (ref 8.5–10.3)
CHLORIDE SERPL-SCNC: 106 MEQ/L (ref 98–107)
CHOL/HDLC RATIO: 3
CHOLEST SERPL-MSCNC: 215 MG/DL (ref 100–200)
CO2 SERPL-SCNC: 24 MEQ/L (ref 21–31)
CREAT SERPL-MCNC: 0.7 MG/DL (ref 0.5–1)
DIFFERENTIAL TYPE: ABNORMAL
EOSINOPHIL NFR BLD: 2.9 % (ref 0–4)
EOSINOPHILS ABSOLUTE COUNT: 0.1 K/UL (ref 0–0.7)
ERYTHROCYTE [DISTWIDTH] IN BLOOD BY AUTOMATED COUNT: 14.8 % (ref 12–15.3)
GFR: 80.5 ML/MIN/1.73M2
GLUCOSE SERPL-MCNC: 99 MG/DL (ref 70–100)
HBA1C MFR BLD: 5.5 % (ref 4.5–5.7)
HCT VFR BLD AUTO: 38.6 % (ref 37–47)
HDLC SERPL-MCNC: 73 MG/DL (ref 40–75)
HGB BLD-MCNC: 12.8 GRAM/DL (ref 12–16)
LDLC SERPL CALC-MCNC: 121 MG/DL (ref 0–125)
LYMPHOCYTES %: 33.2 % (ref 15–45)
LYMPHOCYTES ABSOLUTE COUNT: 1.2 K/UL (ref 1–4.2)
MCH RBC QN AUTO: 30.4 PICOGRAM (ref 27–33)
MCHC RBC AUTO-ENTMCNC: 33.1 GRAM/DL (ref 32–36)
MCV RBC AUTO: 91.7 FEMTOLITER (ref 81–99)
MONOCYTES %: 10.4 % (ref 3–13)
MONOCYTES ABSOLUTE COUNT: 0.4 K/UL (ref 0.1–0.8)
NEUTROPHILS ABSOLUTE COUNT: 1.9 K/UL (ref 2.1–7.6)
NEUTROPHILS RELATIVE PERCENT: 52.6 % (ref 32–80)
NONHDLC SERPL-MCNC: 142 MG/DL (ref 60–125)
PLATELET # BLD AUTO: 191 K/UL (ref 150–350)
PMV BLD AUTO: 9 FEMTOLITER (ref 7–10.2)
POTASSIUM SERPL-SCNC: 4.3 MEQ/L (ref 3.5–5)
RBC # BLD AUTO: 4.21 MIL/UL (ref 4.2–5.4)
SODIUM BLD-SCNC: 142 MEQ/L (ref 135–145)
TOTAL PROTEIN: 6.7 GRAM/DL (ref 6.3–8.2)
TRIGL SERPL-MCNC: 133 MG/DL (ref 30–150)
TSH SERPL DL<=0.005 MIU/L-ACNC: 1.47 UIL/ML (ref 0.35–4)
WBC # BLD AUTO: 3.7 K/UL (ref 4.5–11)

## 2022-03-20 NOTE — TELEPHONE ENCOUNTER
----- Message from Kate Moreland sent at 9/2/2020  4:42 PM CDT -----  Regarding: Graves Disease  Contact: patient [phone]  Can you determine from the blood work if there is any indication of Graves Disease that is prevalent in my family    
PLEASE ADVISE  
Thyroid levels were not drawn  
The patient is a 13y Male complaining of

## 2022-04-05 ENCOUNTER — OFFICE VISIT (OUTPATIENT)
Dept: OPHTHALMOLOGY | Facility: CLINIC | Age: 62
End: 2022-04-05
Payer: COMMERCIAL

## 2022-04-05 DIAGNOSIS — H25.13 NS (NUCLEAR SCLEROSIS), BILATERAL: ICD-10-CM

## 2022-04-05 DIAGNOSIS — H35.372 EPIRETINAL MEMBRANE (ERM) OF LEFT EYE: Primary | ICD-10-CM

## 2022-04-05 PROCEDURE — 1160F RVW MEDS BY RX/DR IN RCRD: CPT | Mod: CPTII,S$GLB,, | Performed by: OPHTHALMOLOGY

## 2022-04-05 PROCEDURE — 99999 PR PBB SHADOW E&M-EST. PATIENT-LVL III: CPT | Mod: PBBFAC,,, | Performed by: OPHTHALMOLOGY

## 2022-04-05 PROCEDURE — 92201 OPSCPY EXTND RTA DRAW UNI/BI: CPT | Mod: S$GLB,,, | Performed by: OPHTHALMOLOGY

## 2022-04-05 PROCEDURE — 1159F MED LIST DOCD IN RCRD: CPT | Mod: CPTII,S$GLB,, | Performed by: OPHTHALMOLOGY

## 2022-04-05 PROCEDURE — 92134 CPTRZ OPH DX IMG PST SGM RTA: CPT | Mod: S$GLB,,, | Performed by: OPHTHALMOLOGY

## 2022-04-05 PROCEDURE — 92134 POSTERIOR SEGMENT OCT RETINA (OCULAR COHERENCE TOMOGRAPHY)-BOTH EYES: ICD-10-PCS | Mod: S$GLB,,, | Performed by: OPHTHALMOLOGY

## 2022-04-05 PROCEDURE — 92014 PR EYE EXAM, EST PATIENT,COMPREHESV: ICD-10-PCS | Mod: S$GLB,,, | Performed by: OPHTHALMOLOGY

## 2022-04-05 PROCEDURE — 92201 PR OPHTHALMOSCOPY, EXT, W/RET DRAW/SCLERAL DEPR, I&R, UNI/BI: ICD-10-PCS | Mod: S$GLB,,, | Performed by: OPHTHALMOLOGY

## 2022-04-05 PROCEDURE — 1159F PR MEDICATION LIST DOCUMENTED IN MEDICAL RECORD: ICD-10-PCS | Mod: CPTII,S$GLB,, | Performed by: OPHTHALMOLOGY

## 2022-04-05 PROCEDURE — 1160F PR REVIEW ALL MEDS BY PRESCRIBER/CLIN PHARMACIST DOCUMENTED: ICD-10-PCS | Mod: CPTII,S$GLB,, | Performed by: OPHTHALMOLOGY

## 2022-04-05 PROCEDURE — 99999 PR PBB SHADOW E&M-EST. PATIENT-LVL III: ICD-10-PCS | Mod: PBBFAC,,, | Performed by: OPHTHALMOLOGY

## 2022-04-05 PROCEDURE — 92014 COMPRE OPH EXAM EST PT 1/>: CPT | Mod: S$GLB,,, | Performed by: OPHTHALMOLOGY

## 2022-04-05 NOTE — PROGRESS NOTES
HPI     1 yr OCT/DFE ck    DLS: 04/22/2021 by Dr. ALEJANDRINA Copeland MD    Patient states her vision stable and  I still have periodic eye pain.   Sharp stabbing pain intermittently.       ++blurred VA OS   --eye pain  --flashes/++floaters  --headaches  --diplopia  -veils/curtains/shadows    Eye Meds:  AT ( Refresh Tears )  daily        OCT - OD - no ME  OS - post peel - foveal contour improved      A/P    1. ERM OS  With decreased Va and MMP  With functional impairment.    S/p  25g PPV/ILM peel/AFx OS for ERM 12/9/20    Doing well, good IOP    Fovea contour much improved  Va improved from 20/70 to 20/40+ prior to NS      2. NS OU  Some increasing post PPV changes OS  Will sig anisometropia/anisokonia  Ok for CE OS  Dr. Pineda - pt checking insurance coverage      Retina PRN    Letter to Dr. Pineda

## 2022-05-06 ENCOUNTER — OFFICE VISIT (OUTPATIENT)
Dept: INTERNAL MEDICINE | Facility: CLINIC | Age: 62
End: 2022-05-06
Payer: COMMERCIAL

## 2022-05-06 VITALS
WEIGHT: 140.31 LBS | HEIGHT: 62 IN | BODY MASS INDEX: 25.82 KG/M2 | OXYGEN SATURATION: 98 % | DIASTOLIC BLOOD PRESSURE: 82 MMHG | HEART RATE: 66 BPM | SYSTOLIC BLOOD PRESSURE: 130 MMHG | TEMPERATURE: 98 F

## 2022-05-06 DIAGNOSIS — M46.1 SI (SACROILIAC) JOINT INFLAMMATION: Primary | ICD-10-CM

## 2022-05-06 DIAGNOSIS — Z12.31 ENCOUNTER FOR SCREENING MAMMOGRAM FOR MALIGNANT NEOPLASM OF BREAST: ICD-10-CM

## 2022-05-06 DIAGNOSIS — F51.01 PRIMARY INSOMNIA: ICD-10-CM

## 2022-05-06 PROCEDURE — 3079F DIAST BP 80-89 MM HG: CPT | Mod: CPTII,S$GLB,, | Performed by: INTERNAL MEDICINE

## 2022-05-06 PROCEDURE — 99214 OFFICE O/P EST MOD 30 MIN: CPT | Mod: S$GLB,,, | Performed by: INTERNAL MEDICINE

## 2022-05-06 PROCEDURE — 3075F SYST BP GE 130 - 139MM HG: CPT | Mod: CPTII,S$GLB,, | Performed by: INTERNAL MEDICINE

## 2022-05-06 PROCEDURE — 3008F BODY MASS INDEX DOCD: CPT | Mod: CPTII,S$GLB,, | Performed by: INTERNAL MEDICINE

## 2022-05-06 PROCEDURE — 3008F PR BODY MASS INDEX (BMI) DOCUMENTED: ICD-10-PCS | Mod: CPTII,S$GLB,, | Performed by: INTERNAL MEDICINE

## 2022-05-06 PROCEDURE — 3079F PR MOST RECENT DIASTOLIC BLOOD PRESSURE 80-89 MM HG: ICD-10-PCS | Mod: CPTII,S$GLB,, | Performed by: INTERNAL MEDICINE

## 2022-05-06 PROCEDURE — 99999 PR PBB SHADOW E&M-EST. PATIENT-LVL III: ICD-10-PCS | Mod: PBBFAC,,, | Performed by: INTERNAL MEDICINE

## 2022-05-06 PROCEDURE — 1159F PR MEDICATION LIST DOCUMENTED IN MEDICAL RECORD: ICD-10-PCS | Mod: CPTII,S$GLB,, | Performed by: INTERNAL MEDICINE

## 2022-05-06 PROCEDURE — 99214 PR OFFICE/OUTPT VISIT, EST, LEVL IV, 30-39 MIN: ICD-10-PCS | Mod: S$GLB,,, | Performed by: INTERNAL MEDICINE

## 2022-05-06 PROCEDURE — 1159F MED LIST DOCD IN RCRD: CPT | Mod: CPTII,S$GLB,, | Performed by: INTERNAL MEDICINE

## 2022-05-06 PROCEDURE — 3075F PR MOST RECENT SYSTOLIC BLOOD PRESS GE 130-139MM HG: ICD-10-PCS | Mod: CPTII,S$GLB,, | Performed by: INTERNAL MEDICINE

## 2022-05-06 PROCEDURE — 99999 PR PBB SHADOW E&M-EST. PATIENT-LVL III: CPT | Mod: PBBFAC,,, | Performed by: INTERNAL MEDICINE

## 2022-05-06 RX ORDER — IBUPROFEN 800 MG/1
800 TABLET ORAL 3 TIMES DAILY
Qty: 30 TABLET | Refills: 0 | Status: SHIPPED | OUTPATIENT
Start: 2022-05-06 | End: 2022-05-13 | Stop reason: SDUPTHER

## 2022-05-06 RX ORDER — TRAZODONE HYDROCHLORIDE 50 MG/1
50 TABLET ORAL NIGHTLY
Qty: 90 TABLET | Refills: 3 | Status: SHIPPED | OUTPATIENT
Start: 2022-05-06 | End: 2022-09-30 | Stop reason: SDUPTHER

## 2022-05-06 RX ORDER — CYCLOBENZAPRINE HCL 5 MG
5 TABLET ORAL 3 TIMES DAILY PRN
Qty: 30 TABLET | Refills: 0 | Status: SHIPPED | OUTPATIENT
Start: 2022-05-06 | End: 2022-05-16

## 2022-05-06 NOTE — PROGRESS NOTES
[facility] Note    Chief Complaint      Chief Complaint   Patient presents with    Low-back Pain     C/o lower back/upper left hip pain.      History of Present Illness      Luisa Padilla is a 62 y.o. female who presents today for low back/hip pain.  Patient comes to appointment alone.     Has gained weight, not exercising as much, sitting more than usual.  Did raise one computer screen up to try to help with making her move.  Took a Mauro class and felt a twinge while twisting her hips 2 weeks ago.  Started with upper left buttocks burning pain, non radiating.  Some relief with ibuprofen.  Has tried heat/ice/CBD cream with some relief. Ice helps the most.  Not worse at certain times of day, is worse when sitting for long time.  No numbness/tingling into leg, did have these symptoms.     Problem List Items Addressed This Visit     Primary insomnia    Relevant Medications    traZODone (DESYREL) 50 MG tablet      Other Visit Diagnoses     SI (sacroiliac) joint inflammation    -  Primary    Relevant Medications    cyclobenzaprine (FLEXERIL) 5 MG tablet    ibuprofen (ADVIL,MOTRIN) 800 MG tablet    Encounter for screening mammogram for malignant neoplasm of breast        Relevant Orders    Mammo Digital Screening Bilat w/ [facility]          Health Maintenance   Topic Date Due    Mammogram  2022    Lipid Panel  2026    TETANUS VACCINE  2031    Hepatitis C Screening  Completed       Past Medical History:   Diagnosis Date    BRCA gene mutation negative     BRCA1 negative     BRCA2 negative     Cataract     Epiretinal membrane (ERM) of left eye     Menopause     HOLLAND (obstructive sleep apnea)     mild, no need of CPAP    Seasonal allergies        Past Surgical History:   Procedure Laterality Date     SECTION      x 2    COLONOSCOPY N/A 4/10/2019    Procedure: COLONOSCOPY;  Surgeon: Lauren Lopez MD;  Location: [facility];  Service: Endoscopy;  Laterality: N/A;     EYE SURGERY Left     VITRECTOMY BY PARS PLANA APPROACH Left 12/9/2020    Procedure: VITRECTOMY, PARS PLANA APPROACH;  Surgeon: QUINTIN Copeland MD;  [facility] [facility] [address] [place] [address];  Service: Ophthalmology;  Laterality: Left;  30 min       family history includes BRCA 1/2 in her sister; Breast cancer in her paternal aunt; Breast cancer (age of onset: 48) in her sister; No Known Problems in her brother and mother; Pulmonary fibrosis in her father; Thyroid disease in her maternal grandmother; Thyroid nodules in her cousin.    Social History     Tobacco Use    Smoking status: Never Smoker    Smokeless tobacco: Never Used   Substance Use Topics    Alcohol use: Yes     Alcohol/week: 2.0 standard drinks     Types: 2 Glasses of wine per week     Comment: every other day    Drug use: No       Review of Systems   Constitutional: Negative for chills and fever.   HENT: Negative for sore throat.    Respiratory: Negative for cough and shortness of breath.    Cardiovascular: Negative for chest pain and palpitations.   Gastrointestinal: Negative for constipation, diarrhea, nausea and vomiting.   Genitourinary: Negative for dysuria and hematuria.   Musculoskeletal: Negative for falls.   Neurological: Negative for headaches.        Outpatient Encounter Medications as of 5/6/2022   Medication Sig Dispense Refill    biotin 1 mg tablet Take 1,000 mcg by mouth 3 (three) times daily.      cholecalciferol, vitamin D3, (VITAMIN D3) 25 mcg (1,000 unit) capsule Take 1 capsule (1,000 Units total) by mouth once daily. 30 capsule 11    fexofenadine (ALLEGRA) 30 MG tablet Take 30 mg by mouth once daily.      vit C/E/zinc ox/lester/lut/zeax (ICAPS AREDS2 ORAL) Take by mouth.      [DISCONTINUED] traZODone (DESYREL) 50 MG tablet Take 1 tablet (50 mg total) by mouth every evening. 90 tablet 2    cyclobenzaprine (FLEXERIL) 5 MG tablet Take 1 tablet (5 mg total) by mouth 3 (three) times daily as needed for Muscle spasms. 30 tablet 0     "ibuprofen (ADVIL,MOTRIN) 800 MG tablet Take 1 tablet (800 mg total) by mouth 3 (three) times daily. 30 tablet 0    traZODone (DESYREL) 50 MG tablet Take 1 tablet (50 mg total) by mouth every evening. 90 tablet 3     No facility-administered encounter medications on file as of 5/6/2022.        Review of patient's allergies indicates:  No Known Allergies    Physical Exam      Vital Signs  Temp: 97.9 °F (36.6 °C)  Pulse: 66  SpO2: 98 %  BP: 130/82  Pain Score:   4  Pain Loc: Back  Height and Weight  Height: 5' 2" (157.5 cm)  Weight: 63.6 kg (140 lb 5.2 oz)  BSA (Calculated - sq m): 1.67 sq meters  BMI (Calculated): 25.7  Weight in (lb) to have BMI = 25: 136.4]    Physical Exam  Constitutional:       Appearance: She is well-developed.   HENT:      Head: Normocephalic and atraumatic.      Right Ear: External ear normal.      Left Ear: External ear normal.   Eyes:      General:         Right eye: No discharge.         Left eye: No discharge.   Cardiovascular:      Rate and Rhythm: Normal rate and regular rhythm.      Heart sounds: Normal heart sounds. No murmur heard.  Pulmonary:      Effort: Pulmonary effort is normal. No respiratory distress.      Breath sounds: Normal breath sounds.   Abdominal:      General: There is no distension.      Palpations: Abdomen is soft.      Tenderness: There is no abdominal tenderness. There is no guarding.   Musculoskeletal:         General: Normal range of motion.      Cervical back: Normal range of motion.   Skin:     General: Skin is warm and dry.   Neurological:      Mental Status: She is alert and oriented to person, place, and time.   Psychiatric:         Behavior: Behavior normal.          Laboratory:  CBC:  No results for input(s): WBC, RBC, HGB, HCT, PLT, MCV, MCH, MCHC in the last 2160 hours.  CMP:  No results for input(s): GLU, CALCIUM, ALBUMIN, PROT, NA, K, CO2, CL, BUN, ALKPHOS, ALT, AST, BILITOT in the last 2160 hours.    Invalid input(s): CREATININ  URINALYSIS:  No " results for input(s): COLORU, CLARITYU, SPECGRAV, PHUR, PROTEINUA, GLUCOSEU, BILIRUBINCON, BLOODU, WBCU, RBCU, BACTERIA, MUCUS, NITRITE, LEUKOCYTESUR, UROBILINOGEN, HYALINECASTS in the last 2160 hours.   LIPIDS:  No results for input(s): TSH, HDL, CHOL, TRIG, LDLCALC, CHOLHDL, NONHDLCHOL, TOTALCHOLEST in the last 2160 hours.  TSH:  No results for input(s): TSH in the last 2160 hours.  A1C:  No results for input(s): HGBA1C in the last 2160 hours.    Radiology:  No results found in the last 30 days.     Assessment/Plan     Luisa Padilla is a 62 y.o.female with:    1. SI (sacroiliac) joint inflammation  - cyclobenzaprine (FLEXERIL) 5 MG tablet; Take 1 tablet (5 mg total) by mouth 3 (three) times daily as needed for Muscle spasms.  Dispense: 30 tablet; Refill: 0  - ibuprofen (ADVIL,MOTRIN) 800 MG tablet; Take 1 tablet (800 mg total) by mouth 3 (three) times daily.  Dispense: 30 tablet; Refill: 0    2. Encounter for screening mammogram for malignant neoplasm of breast  - Mammo Digital Screening Bilat w/ [facility]; Future    3. Primary insomnia  - traZODone (DESYREL) 50 MG tablet; Take 1 tablet (50 mg total) by mouth every evening.  Dispense: 90 tablet; Refill: 3      -home exercise handout given  -Continue current medications and maintain follow up with specialists.    -Follow up if symptoms worsen or fail to improve.       Irina Nelson MD  [facility]

## 2022-05-13 ENCOUNTER — PATIENT MESSAGE (OUTPATIENT)
Dept: INTERNAL MEDICINE | Facility: CLINIC | Age: 62
End: 2022-05-13
Payer: COMMERCIAL

## 2022-05-13 DIAGNOSIS — M25.552 LEFT HIP PAIN: Primary | ICD-10-CM

## 2022-05-13 DIAGNOSIS — M46.1 SI (SACROILIAC) JOINT INFLAMMATION: ICD-10-CM

## 2022-05-13 RX ORDER — IBUPROFEN 800 MG/1
800 TABLET ORAL 3 TIMES DAILY
Qty: 60 TABLET | Refills: 1 | Status: SHIPPED | OUTPATIENT
Start: 2022-05-13 | End: 2024-02-05

## 2022-05-14 ENCOUNTER — IMMUNIZATION (OUTPATIENT)
Dept: PRIMARY CARE CLINIC | Facility: CLINIC | Age: 62
End: 2022-05-14
Payer: COMMERCIAL

## 2022-05-14 DIAGNOSIS — Z23 NEED FOR VACCINATION: Primary | ICD-10-CM

## 2022-05-14 PROCEDURE — 0064A COVID-19, MRNA, LNP-S, PF, 100 MCG/0.25 ML DOSE VACCINE (MODERNA BOOSTER): CPT | Mod: CV19,PBBFAC

## 2022-05-16 ENCOUNTER — PATIENT MESSAGE (OUTPATIENT)
Dept: INTERNAL MEDICINE | Facility: CLINIC | Age: 62
End: 2022-05-16
Payer: COMMERCIAL

## 2022-05-16 DIAGNOSIS — M46.1 SI (SACROILIAC) JOINT INFLAMMATION: Primary | ICD-10-CM

## 2022-05-17 NOTE — TELEPHONE ENCOUNTER
"Per ortho NP, Shwetha Hobbs this was scheduled on my schedule for "hip pain" but the note states SI joint/ low back pain. This patient needs to see a spine specialist for this. Please reschedule      Can you change the referral to spine please? Will let pt know   "

## 2022-05-17 NOTE — PROGRESS NOTES
DATE: 2022  PATIENT: Luisa Padilla    Supervising Physician: Saji Cadena M.D.    CHIEF COMPLAINT: left hip pain    HISTORY:  Luisa Padilla is a 62 y.o. female here for initial evaluation of left low back pain (Back - 2). The pain has been present for about 1 month. Denies specific injury. The patient describes the pain as stiffness in the morning with a burning pain that increases in the evening.  The pain is worse with movement and improved by ice and rest. There is no associated numbness and tingling. There is no subjective weakness. Prior treatments have included advil, but no PT, ESIs or surgery.    The patient denies myelopathic symptoms such as handwriting changes or difficulty with buttons/coins/keys. Denies perineal paresthesias, bowel/bladder dysfunction.    PAST MEDICAL/SURGICAL HISTORY:  Past Medical History:   Diagnosis Date    BRCA gene mutation negative     BRCA1 negative     BRCA2 negative     Cataract     Epiretinal membrane (ERM) of left eye     Menopause     HOLLAND (obstructive sleep apnea)     mild, no need of CPAP    Seasonal allergies      Past Surgical History:   Procedure Laterality Date     SECTION      x 2    COLONOSCOPY N/A 4/10/2019    Procedure: COLONOSCOPY;  Surgeon: Lauren Lopez MD;  Location: [facility];  Service: Endoscopy;  Laterality: N/A;    EYE SURGERY Left     VITRECTOMY BY PARS PLANA APPROACH Left 2020    Procedure: VITRECTOMY, PARS PLANA APPROACH;  Surgeon: QUINTIN Copeland MD;  Location: [address];  Service: Ophthalmology;  Laterality: Left;  30 min       Medications:   Current Outpatient Medications on File Prior to Visit   Medication Sig Dispense Refill    biotin 1 mg tablet Take 1,000 mcg by mouth 3 (three) times daily.      cholecalciferol, vitamin D3, (VITAMIN D3) 25 mcg (1,000 unit) capsule Take 1 capsule (1,000 Units total) by mouth once daily. 30 capsule 11    fexofenadine (ALLEGRA) 30 MG tablet Take 30 mg by  "mouth once daily.      ibuprofen (ADVIL,MOTRIN) 800 MG tablet Take 1 tablet (800 mg total) by mouth 3 (three) times daily. 60 tablet 1    traZODone (DESYREL) 50 MG tablet Take 1 tablet (50 mg total) by mouth every evening. 90 tablet 3    vit C/E/zinc ox/lester/lut/zeax (ICAPS AREDS2 ORAL) Take by mouth.       No current facility-administered medications on file prior to visit.       Social History:   Social History     Socioeconomic History    Marital status:    Tobacco Use    Smoking status: Never Smoker    Smokeless tobacco: Never Used   Substance and Sexual Activity    Alcohol use: Yes     Alcohol/week: 2.0 standard drinks     Types: 2 Glasses of wine per week     Comment: every other day    Drug use: No    Sexual activity: Not Currently       REVIEW OF SYSTEMS:  Constitution: Negative. Negative for chills, fever and night sweats.   Cardiovascular: Negative for chest pain and syncope.   Respiratory: Negative for cough and shortness of breath.   Gastrointestinal: See HPI. Negative for nausea/vomiting. Negative for abdominal pain.  Genitourinary: See HPI. Negative for discoloration or dysuria.  Skin: Negative for dry skin, itching and rash.   Hematologic/Lymphatic: Negative for bleeding problem. Does not bruise/bleed easily.   Musculoskeletal: Negative for falls and muscle weakness.   Neurological: See HPI. No seizures.   Endocrine: Negative for polydipsia, polyphagia and polyuria.   Allergic/Immunologic: Negative for hives and persistent infections.     EXAM:  Ht 5' 2" (1.575 m)   Wt 64.9 kg (143 lb 3 oz)   BMI 26.19 kg/m²     General: The patient is a very pleasant 62 y.o. female in no apparent distress, the patient is oriented to person, place and time.  Psych: Normal mood and affect  HEENT: Vision grossly intact, hearing intact to the spoken word.  Lungs: Respirations unlabored.  Gait: Normal station and gait, no difficulty with toe or heel walk.   Skin: Dorsal lumbar skin negative for rashes, " lesions, hairy patches and surgical scars. There is mild lumbar tenderness to palpation.  Range of motion: Lumbar range of motion is acceptable.  Spinal Balance: Global saggital and coronal spinal balance acceptable, not significant for scoliosis and kyphosis.  Musculoskeletal: No pain with the range of motion of the bilateral hips. No trochanteric tenderness to palpation.  Vascular: Bilateral lower extremities warm and well perfused, dorsalis pedis pulses 2+ bilaterally.  Neurological: Normal strength and tone in all major motor groups in the bilateral lower extremities. Normal sensation to light touch in the L2-S1 dermatomes bilaterally.  Deep tendon reflexes symmetric 2+ in the bilateral lower extremities.  Negative Babinski bilaterally. Straight leg raise negative bilaterally.    IMAGING:      Today I personally reviewed AP, Lat and Flex/Ex  upright L-spine films that demonstrate slight anterolisthesis of L3 on L4. Mild DDD at L5/S1.       Body mass index is 26.19 kg/m².    Hemoglobin A1C   Date Value Ref Range Status   12/28/2021 5.5 4.5 - 5.7 % Final   04/01/2021 5.7 (H) 4.0 - 5.6 % Final     Comment:     ADA Screening Guidelines:  5.7-6.4%  Consistent with prediabetes  >or=6.5%  Consistent with diabetes    High levels of fetal hemoglobin interfere with the HbA1C  assay. Heterozygous hemoglobin variants (HbS, HgC, etc)do  not significantly interfere with this assay.   However, presence of multiple variants may affect accuracy.     06/17/2020 5.7 (H) 4.0 - 5.6 % Final     Comment:     ADA Screening Guidelines:  5.7-6.4%  Consistent with prediabetes  >or=6.5%  Consistent with diabetes  High levels of fetal hemoglobin interfere with the HbA1C  assay. Heterozygous hemoglobin variants (HbS, HgC, etc)do  not significantly interfere with this assay.   However, presence of multiple variants may affect accuracy.             ASSESSMENT/PLAN:    Diagnoses and all orders for this visit:    Chronic left-sided low back pain  without sciatica  -     methylPREDNISolone (MEDROL DOSEPACK) 4 mg tablet; use as directed  -     gabapentin (NEURONTIN) 100 MG capsule; Take 1 capsule (100 mg total) by mouth every evening.  -     Ambulatory referral/consult to Physical/Occupational Therapy; Future    Left hip pain  -     Ambulatory referral/consult to Orthopedics        Today we discussed at length all of the different treatment options including anti-inflammatories, acetaminophen, rest, ice, heat, physical therapy including strengthening and stretching exercises, home exercises, ROM, aerobic conditioning, aqua therapy, other modalities including ultrasound, massage, and dry needling, epidural steroid injections and finally surgical intervention.      I provided the patient with a home exercise program. It is the [employer] spine conditioning program. Exercises include head rolls, kneeling back extension, sitting rotation stretch, modified seated side straddle, knee to chest, bird dog, plank, modified seated plank, hip bridges, abdominal bracing, and abdominal crunch. Pt will complete each exercise 5 times daily for 6-8 weeks.    Medrol dose pack and gabapentin sent to pharmacy. PT orders placed. The patient will follow up in 6-8 weeks if her pain persists. I will order a MRI at that time.

## 2022-05-18 ENCOUNTER — TELEPHONE (OUTPATIENT)
Dept: ORTHOPEDICS | Facility: CLINIC | Age: 62
End: 2022-05-18
Payer: COMMERCIAL

## 2022-05-18 DIAGNOSIS — M51.36 DDD (DEGENERATIVE DISC DISEASE), LUMBAR: Primary | ICD-10-CM

## 2022-05-19 ENCOUNTER — HOSPITAL ENCOUNTER (OUTPATIENT)
Dept: RADIOLOGY | Facility: HOSPITAL | Age: 62
Discharge: HOME | End: 2022-05-19
Attending: REGISTERED NURSE
Payer: COMMERCIAL

## 2022-05-19 ENCOUNTER — OFFICE VISIT (OUTPATIENT)
Dept: ORTHOPEDICS | Facility: CLINIC | Age: 62
End: 2022-05-19
Payer: COMMERCIAL

## 2022-05-19 VITALS — HEIGHT: 62 IN | WEIGHT: 143.19 LBS | BODY MASS INDEX: 26.35 KG/M2

## 2022-05-19 DIAGNOSIS — M54.50 CHRONIC LEFT-SIDED LOW BACK PAIN WITHOUT SCIATICA: Primary | ICD-10-CM

## 2022-05-19 DIAGNOSIS — M25.552 LEFT HIP PAIN: ICD-10-CM

## 2022-05-19 DIAGNOSIS — G89.29 CHRONIC LEFT-SIDED LOW BACK PAIN WITHOUT SCIATICA: Primary | ICD-10-CM

## 2022-05-19 DIAGNOSIS — M51.36 DDD (DEGENERATIVE DISC DISEASE), LUMBAR: ICD-10-CM

## 2022-05-19 PROCEDURE — 72110 X-RAY EXAM L-2 SPINE 4/>VWS: CPT | Mod: TC

## 2022-05-19 PROCEDURE — 1159F PR MEDICATION LIST DOCUMENTED IN MEDICAL RECORD: ICD-10-PCS | Mod: CPTII,S$GLB,, | Performed by: REGISTERED NURSE

## 2022-05-19 PROCEDURE — 1160F RVW MEDS BY RX/DR IN RCRD: CPT | Mod: CPTII,S$GLB,, | Performed by: REGISTERED NURSE

## 2022-05-19 PROCEDURE — 72110 X-RAY EXAM L-2 SPINE 4/>VWS: CPT | Mod: 26,,, | Performed by: RADIOLOGY

## 2022-05-19 PROCEDURE — 99204 OFFICE O/P NEW MOD 45 MIN: CPT | Mod: S$GLB,,, | Performed by: REGISTERED NURSE

## 2022-05-19 PROCEDURE — 99204 PR OFFICE/OUTPT VISIT, NEW, LEVL IV, 45-59 MIN: ICD-10-PCS | Mod: S$GLB,,, | Performed by: REGISTERED NURSE

## 2022-05-19 PROCEDURE — 1159F MED LIST DOCD IN RCRD: CPT | Mod: CPTII,S$GLB,, | Performed by: REGISTERED NURSE

## 2022-05-19 PROCEDURE — 99999 PR PBB SHADOW E&M-EST. PATIENT-LVL III: CPT | Mod: PBBFAC,,, | Performed by: REGISTERED NURSE

## 2022-05-19 PROCEDURE — 72110 XR LUMBAR SPINE AP AND LAT WITH FLEX/EXT: ICD-10-PCS | Mod: 26,,, | Performed by: RADIOLOGY

## 2022-05-19 PROCEDURE — 3008F PR BODY MASS INDEX (BMI) DOCUMENTED: ICD-10-PCS | Mod: CPTII,S$GLB,, | Performed by: REGISTERED NURSE

## 2022-05-19 PROCEDURE — 3008F BODY MASS INDEX DOCD: CPT | Mod: CPTII,S$GLB,, | Performed by: REGISTERED NURSE

## 2022-05-19 PROCEDURE — 1160F PR REVIEW ALL MEDS BY PRESCRIBER/CLIN PHARMACIST DOCUMENTED: ICD-10-PCS | Mod: CPTII,S$GLB,, | Performed by: REGISTERED NURSE

## 2022-05-19 PROCEDURE — 99999 PR PBB SHADOW E&M-EST. PATIENT-LVL III: ICD-10-PCS | Mod: PBBFAC,,, | Performed by: REGISTERED NURSE

## 2022-05-19 RX ORDER — METHYLPREDNISOLONE 4 MG/1
TABLET ORAL
Qty: 1 EACH | Refills: 0 | Status: SHIPPED | OUTPATIENT
Start: 2022-05-19 | End: 2022-06-09

## 2022-05-19 RX ORDER — GABAPENTIN 100 MG/1
100 CAPSULE ORAL NIGHTLY
Qty: 30 CAPSULE | Refills: 11 | Status: SHIPPED | OUTPATIENT
Start: 2022-05-19 | End: 2023-05-05

## 2022-05-23 ENCOUNTER — APPOINTMENT (OUTPATIENT)
Dept: RADIOLOGY | Facility: OTHER | Age: 62
End: 2022-05-23
Attending: INTERNAL MEDICINE
Payer: COMMERCIAL

## 2022-05-23 VITALS — BODY MASS INDEX: 26.33 KG/M2 | HEIGHT: 62 IN | WEIGHT: 143.06 LBS

## 2022-05-23 DIAGNOSIS — Z12.31 ENCOUNTER FOR SCREENING MAMMOGRAM FOR MALIGNANT NEOPLASM OF BREAST: ICD-10-CM

## 2022-05-23 PROCEDURE — 77067 SCR MAMMO BI INCL CAD: CPT | Mod: 26,,, | Performed by: RADIOLOGY

## 2022-05-23 PROCEDURE — 77067 MAMMO DIGITAL SCREENING BILAT WITH TOMO: ICD-10-PCS | Mod: 26,,, | Performed by: RADIOLOGY

## 2022-05-23 PROCEDURE — 77067 SCR MAMMO BI INCL CAD: CPT | Mod: TC,PN

## 2022-05-23 PROCEDURE — 77063 BREAST TOMOSYNTHESIS BI: CPT | Mod: TC,PN

## 2022-05-23 PROCEDURE — 77063 MAMMO DIGITAL SCREENING BILAT WITH TOMO: ICD-10-PCS | Mod: 26,,, | Performed by: RADIOLOGY

## 2022-05-23 PROCEDURE — 77063 BREAST TOMOSYNTHESIS BI: CPT | Mod: 26,,, | Performed by: RADIOLOGY

## 2023-01-03 ENCOUNTER — TELEPHONE (OUTPATIENT)
Dept: ORTHOPEDICS | Facility: CLINIC | Age: 63
End: 2023-01-03
Payer: COMMERCIAL

## 2023-01-03 DIAGNOSIS — R52 PAIN: Primary | ICD-10-CM

## 2023-01-03 NOTE — TELEPHONE ENCOUNTER
Attempted to contact this patient by phone. Left VM for pt to return a call to clinic regarding previous incoming phone call/ patient portal message.      ----- Message from Richardson Malone sent at 1/3/2023  2:47 PM CST -----  Regarding: Return CAll  Type: Patient Returning Call            Who Called:Leticia            Who Left Message for Patient: Leticia            Does the patient know what this is regarding? Yes            Best Call Back Number: [phone]

## 2023-01-03 NOTE — TELEPHONE ENCOUNTER
Left pt vm asking for call back to get appt davin with x-ray    ----- Message from Eli Cooper sent at 1/3/2023 11:49 AM CST -----  Regarding: PT'S REQUESTING A CALL BACK REGARDING LEDT HAND LOCKING  Contact: PT  Pt's requesting a call back as soon as possible..     Confirmed contact info below:  Contact Name: Luisa Padilla  Phone Number: [phone]

## 2023-01-09 ENCOUNTER — TELEPHONE (OUTPATIENT)
Dept: ORTHOPEDICS | Facility: CLINIC | Age: 63
End: 2023-01-09
Payer: COMMERCIAL

## 2023-01-09 ENCOUNTER — PATIENT MESSAGE (OUTPATIENT)
Dept: ORTHOPEDICS | Facility: CLINIC | Age: 63
End: 2023-01-09
Payer: COMMERCIAL

## 2023-01-09 NOTE — TELEPHONE ENCOUNTER
Spoke with pt and she stated she has [employer].  Pt was advised to send a copy of insurance card for verification.

## 2023-01-11 ENCOUNTER — OFFICE VISIT (OUTPATIENT)
Dept: ORTHOPEDICS | Facility: CLINIC | Age: 63
End: 2023-01-11
Payer: COMMERCIAL

## 2023-01-11 ENCOUNTER — HOSPITAL ENCOUNTER (OUTPATIENT)
Dept: RADIOLOGY | Facility: OTHER | Age: 63
Discharge: HOME | End: 2023-01-11
Attending: PHYSICIAN ASSISTANT
Payer: COMMERCIAL

## 2023-01-11 VITALS
DIASTOLIC BLOOD PRESSURE: 73 MMHG | BODY MASS INDEX: 26.31 KG/M2 | WEIGHT: 143 LBS | HEART RATE: 68 BPM | SYSTOLIC BLOOD PRESSURE: 117 MMHG | HEIGHT: 62 IN

## 2023-01-11 DIAGNOSIS — M65.312 TRIGGER FINGER OF LEFT THUMB: Primary | ICD-10-CM

## 2023-01-11 DIAGNOSIS — R52 PAIN: ICD-10-CM

## 2023-01-11 PROCEDURE — 99203 OFFICE O/P NEW LOW 30 MIN: CPT | Mod: S$GLB,,, | Performed by: PHYSICIAN ASSISTANT

## 2023-01-11 PROCEDURE — 73130 XR HAND COMPLETE 3 VIEW LEFT: ICD-10-PCS | Mod: 26,LT,, | Performed by: RADIOLOGY

## 2023-01-11 PROCEDURE — 99999 PR PBB SHADOW E&M-EST. PATIENT-LVL III: ICD-10-PCS | Mod: PBBFAC,,, | Performed by: PHYSICIAN ASSISTANT

## 2023-01-11 PROCEDURE — 99999 PR PBB SHADOW E&M-EST. PATIENT-LVL III: CPT | Mod: PBBFAC,,, | Performed by: PHYSICIAN ASSISTANT

## 2023-01-11 PROCEDURE — 73130 X-RAY EXAM OF HAND: CPT | Mod: 26,LT,, | Performed by: RADIOLOGY

## 2023-01-11 PROCEDURE — 99203 PR OFFICE/OUTPT VISIT, NEW, LEVL III, 30-44 MIN: ICD-10-PCS | Mod: S$GLB,,, | Performed by: PHYSICIAN ASSISTANT

## 2023-01-11 PROCEDURE — 73130 X-RAY EXAM OF HAND: CPT | Mod: TC,FY,LT

## 2023-01-11 NOTE — PROGRESS NOTES
Subjective:      Patient ID: Luisa Padilla is a 62 y.o. female.    Chief Complaint: Pain of the Left Hand      HPI  Luisa Padilla is a right hand dominant 62 y.o. female presenting today for left thumb pain and locking.  There was not a history of trauma.  Onset of symptoms began 1 month ago.  She reports that the thumb locking is intermittent.  She started using a thumb brace last week, reports that the pain and locking are slightly improved.  She denies finger numbness or tingling.      Review of patient's allergies indicates:  No Known Allergies      Current Outpatient Medications   Medication Sig Dispense Refill    biotin 1 mg tablet Take 1,000 mcg by mouth 3 (three) times daily.      cholecalciferol, vitamin D3, (VITAMIN D3) 25 mcg (1,000 unit) capsule Take 1 capsule (1,000 Units total) by mouth once daily. 30 capsule 11    fexofenadine (ALLEGRA) 30 MG tablet Take 30 mg by mouth once daily.      gabapentin (NEURONTIN) 100 MG capsule Take 1 capsule (100 mg total) by mouth every evening. 30 capsule 11    ibuprofen (ADVIL,MOTRIN) 800 MG tablet Take 1 tablet (800 mg total) by mouth 3 (three) times daily. 60 tablet 1    traZODone (DESYREL) 50 MG tablet Take 1 tablet (50 mg total) by mouth every evening. 90 tablet 3    vit C/E/zinc ox/lester/lut/zeax (ICAPS AREDS2 ORAL) Take by mouth.       No current facility-administered medications for this visit.       Past Medical History:   Diagnosis Date    BRCA gene mutation negative     BRCA1 negative     BRCA2 negative     Cataract     Epiretinal membrane (ERM) of left eye     Menopause     HOLLAND (obstructive sleep apnea)     mild, no need of CPAP    Seasonal allergies        Past Surgical History:   Procedure Laterality Date     SECTION      x 2    COLONOSCOPY N/A 4/10/2019    Procedure: COLONOSCOPY;  Surgeon: Lauren Lopez MD;  Location: [facility];  Service: Endoscopy;  Laterality: N/A;    EYE SURGERY Left     VITRECTOMY BY PARS PLANA APPROACH Left  "12/9/2020    Procedure: VITRECTOMY, PARS PLANA APPROACH;  Surgeon: QUINTIN Copeland MD;  Location: [facility] OR [address] [place] [address];  Service: Ophthalmology;  Laterality: Left;  30 min         Review of Systems:  ROS:  Constitutional: no fever or chills  Skin: no rash or suspicious lesions  Musculoskeletal: See HPI.   Neurological: no headaches, lightheadedness, or dizziness. No numbness or tingling  Psychological/behavioral: no anxiety or depression      OBJECTIVE:     PHYSICAL EXAM:  Height: 5' 2" (157.5 cm) Weight: 64.9 kg (143 lb)  Vitals:    01/11/23 1056   BP: 117/73   Pulse: 68   Weight: 64.9 kg (143 lb)   Height: 5' 2" (1.575 m)   PainSc:   7     Vitals reviewed.  Constitutional: NAD. Patient appears well-developed and well-nourished.   HENT:   Head: Normocephalic and atraumatic.   Neck: Normal range of motion.   Cardiovascular: Normal rate.    Pulmonary/Chest: Effort normal. No respiratory distress.   Neurological: Patient is awake, alert, oriented.   Psychiatric: Patient has a normal mood and affect. Behavior is normal. Judgment and thought content normal.  Musculoskeletal:  The scar is noted.  No lacerations or abrasions.  Nontender to palpation.  Limited left thumb motion, pain with attempted motion.  Pain with passive motion of the left thumb.  Palpable thickening appreciated along the flexor tendon at the A1 pulley, no triggering on exam today.  Neurovascularly intact good sensation and motor function, capillary refill, 2+ radial pulses.    RADIOGRAPHS:  Left hand XRay, 1/11/2023  FINDINGS:  No evidence of an acute fracture.  There is degenerative change at the basal joint.  Remaining joint spaces appear relatively well maintained.  No radiopaque foreign bodies.  Impression:  Degenerative change at the basal joint.    Comments: I have personally reviewed the imaging and I agree with the above radiologist's report.    ASSESSMENT/PLAN:   Luisa was seen today for pain.    Diagnoses and all orders for this " visit:    Trigger finger of left thumb           - We talked at length about the anatomy and pathophysiology of   Encounter Diagnosis   Name Primary?    Trigger finger of left thumb Yes       - discussed patient's symptoms and physical exam findings, discussed conservative and surgical treatment options for trigger finger.  We discussed activity modification, rest, bracing, OT, ice, Voltaren gel, trigger finger injection, and surgery  - she will continue bracing for comfort  - she will start gentle massage and Voltaren gel; will start icing  - she will call back if she decides to proceed with steroid injection  - follow-up as needed  - patient information provided on AVS  - call with questions or concerns    Disclaimer: This note has been generated using voice-recognition software. There may be typographical errors that have been missed during proof-reading.

## 2023-01-12 ENCOUNTER — PATIENT MESSAGE (OUTPATIENT)
Dept: ORTHOPEDICS | Facility: CLINIC | Age: 63
End: 2023-01-12
Payer: COMMERCIAL

## 2023-04-13 DIAGNOSIS — Z78.0 MENOPAUSE: ICD-10-CM

## 2023-05-01 ENCOUNTER — HOSPITAL ENCOUNTER (OUTPATIENT)
Dept: RADIOLOGY | Facility: HOSPITAL | Age: 63
Discharge: HOME | End: 2023-05-01
Attending: INTERNAL MEDICINE
Payer: MEDICARE

## 2023-05-01 DIAGNOSIS — Z78.0 MENOPAUSE: ICD-10-CM

## 2023-05-01 PROCEDURE — 77080 DXA BONE DENSITY AXIAL: CPT | Mod: TC

## 2023-05-01 PROCEDURE — 77080 DXA BONE DENSITY AXIAL SKELETON 1 OR MORE SITES: ICD-10-PCS | Mod: 26,,, | Performed by: RADIOLOGY

## 2023-05-01 PROCEDURE — 77080 DXA BONE DENSITY AXIAL: CPT | Mod: 26,,, | Performed by: RADIOLOGY

## 2023-05-05 ENCOUNTER — OFFICE VISIT (OUTPATIENT)
Dept: PRIMARY CARE CLINIC | Facility: CLINIC | Age: 63
End: 2023-05-05
Payer: MEDICARE

## 2023-05-05 VITALS
HEART RATE: 65 BPM | OXYGEN SATURATION: 97 % | HEIGHT: 62 IN | DIASTOLIC BLOOD PRESSURE: 74 MMHG | BODY MASS INDEX: 26.37 KG/M2 | SYSTOLIC BLOOD PRESSURE: 126 MMHG | WEIGHT: 143.31 LBS

## 2023-05-05 DIAGNOSIS — F51.01 PRIMARY INSOMNIA: ICD-10-CM

## 2023-05-05 DIAGNOSIS — R73.03 PREDIABETES: ICD-10-CM

## 2023-05-05 DIAGNOSIS — E66.3 OVERWEIGHT: ICD-10-CM

## 2023-05-05 DIAGNOSIS — M46.1 SACROILIITIS: ICD-10-CM

## 2023-05-05 DIAGNOSIS — Z12.31 ENCOUNTER FOR SCREENING MAMMOGRAM FOR MALIGNANT NEOPLASM OF BREAST: ICD-10-CM

## 2023-05-05 DIAGNOSIS — E78.2 MIXED HYPERLIPIDEMIA: Primary | ICD-10-CM

## 2023-05-05 PROCEDURE — 1159F PR MEDICATION LIST DOCUMENTED IN MEDICAL RECORD: ICD-10-PCS | Mod: CPTII,S$GLB,, | Performed by: INTERNAL MEDICINE

## 2023-05-05 PROCEDURE — 3008F BODY MASS INDEX DOCD: CPT | Mod: CPTII,S$GLB,, | Performed by: INTERNAL MEDICINE

## 2023-05-05 PROCEDURE — 3078F DIAST BP <80 MM HG: CPT | Mod: CPTII,S$GLB,, | Performed by: INTERNAL MEDICINE

## 2023-05-05 PROCEDURE — 3078F PR MOST RECENT DIASTOLIC BLOOD PRESSURE < 80 MM HG: ICD-10-PCS | Mod: CPTII,S$GLB,, | Performed by: INTERNAL MEDICINE

## 2023-05-05 PROCEDURE — 99999 PR PBB SHADOW E&M-EST. PATIENT-LVL III: CPT | Mod: PBBFAC,,, | Performed by: INTERNAL MEDICINE

## 2023-05-05 PROCEDURE — 3008F PR BODY MASS INDEX (BMI) DOCUMENTED: ICD-10-PCS | Mod: CPTII,S$GLB,, | Performed by: INTERNAL MEDICINE

## 2023-05-05 PROCEDURE — 99214 OFFICE O/P EST MOD 30 MIN: CPT | Mod: S$GLB,,, | Performed by: INTERNAL MEDICINE

## 2023-05-05 PROCEDURE — 1159F MED LIST DOCD IN RCRD: CPT | Mod: CPTII,S$GLB,, | Performed by: INTERNAL MEDICINE

## 2023-05-05 PROCEDURE — 99999 PR PBB SHADOW E&M-EST. PATIENT-LVL III: ICD-10-PCS | Mod: PBBFAC,,, | Performed by: INTERNAL MEDICINE

## 2023-05-05 PROCEDURE — 99214 PR OFFICE/OUTPT VISIT, EST, LEVL IV, 30-39 MIN: ICD-10-PCS | Mod: S$GLB,,, | Performed by: INTERNAL MEDICINE

## 2023-05-05 PROCEDURE — 3074F SYST BP LT 130 MM HG: CPT | Mod: CPTII,S$GLB,, | Performed by: INTERNAL MEDICINE

## 2023-05-05 PROCEDURE — 3074F PR MOST RECENT SYSTOLIC BLOOD PRESSURE < 130 MM HG: ICD-10-PCS | Mod: CPTII,S$GLB,, | Performed by: INTERNAL MEDICINE

## 2023-05-05 NOTE — ASSESSMENT & PLAN NOTE
Not on meds.  The 10-year ASCVD risk score (Mai MARIE, et al., 2019) is: 4.3%    Values used to calculate the score:      Age: 63 years      Sex: Female      Is Non- : No      Diabetic: No      Tobacco smoker: No      Systolic Blood Pressure: 117 mmHg      Is BP treated: No      HDL Cholesterol: 73 mg/dL      Total Cholesterol: 215 mg/dL

## 2023-05-05 NOTE — PROGRESS NOTES
[facility]    Chief Complaint      Chief Complaint   Patient presents with    Annual Exam     History of Present Illness      Luisa Padilla is a 63 y.o. female who presents today for follow up of HLD.  Patient comes to appointment alone.    Getting injection in thumb with Dr. Elkins in the summer.    Had SI joint pain, had xrays with OA.  Does better the more she moves (does zhang) and when she wears better shoes.    Gets abd cramps every few weeks, lasting a couple seconds then goes away.  Bowels do not move well, taking miralax.    Problem List Items Addressed This Visit       Prediabetes    Current Assessment & Plan     A1C 5.5 in 12/2021. Not on meds           Relevant Orders    Hemoglobin A1C    Mixed hyperlipidemia - Primary    Current Assessment & Plan     Not on meds.  The 10-year ASCVD risk score (Mai MARIE, et al., 2019) is: 4.3%    Values used to calculate the score:      Age: 63 years      Sex: Female      Is Non- : No      Diabetic: No      Tobacco smoker: No      Systolic Blood Pressure: 117 mmHg      Is BP treated: No      HDL Cholesterol: 73 mg/dL      Total Cholesterol: 215 mg/dL             Relevant Orders    CBC Auto Differential    Lipid Panel    Comprehensive Metabolic Panel    Overweight    Current Assessment & Plan     Mostly eats salads and meat, avoids carbs. Eats twice per day some days.  Exercises daily.           Primary insomnia    Current Assessment & Plan     Takes Trazodone 50 mg, has trouble staying asleep.  Medication gives her dry mouth.            Other Visit Diagnoses       Encounter for screening mammogram for malignant neoplasm of breast        Relevant Orders    Mammo Digital Screening Bilat w/ [facility]    Sacroiliitis                Health Maintenance   Topic Date Due    Mammogram  05/23/2023    DEXA Scan  05/01/2026    Lipid Panel  12/28/2026    TETANUS VACCINE  04/01/2031    Hepatitis C Screening  Completed       Past Medical  History:   Diagnosis Date    BRCA gene mutation negative     BRCA1 negative     BRCA2 negative     Cataract     Epiretinal membrane (ERM) of left eye     Menopause     HOLLAND (obstructive sleep apnea)     mild, no need of CPAP    Seasonal allergies        Past Surgical History:   Procedure Laterality Date     SECTION      x 2    COLONOSCOPY N/A 4/10/2019    Procedure: COLONOSCOPY;  Surgeon: Lauren Lopez MD;  Location: [facility];  Service: Endoscopy;  Laterality: N/A;    EYE SURGERY Left     VITRECTOMY BY PARS PLANA APPROACH Left 2020    Procedure: VITRECTOMY, PARS PLANA APPROACH;  Surgeon: QUINTIN Copeland MD;  Location: [address];  Service: Ophthalmology;  Laterality: Left;  30 min       family history includes BRCA 1/2 in her sister; Breast cancer in her paternal aunt; Breast cancer (age of onset: 48) in her sister; No Known Problems in her brother and mother; Pulmonary fibrosis in her father; Thyroid disease in her maternal grandmother; Thyroid nodules in her cousin.    Social History     Tobacco Use    Smoking status: Never    Smokeless tobacco: Never   Substance Use Topics    Alcohol use: Yes     Alcohol/week: 2.0 standard drinks     Types: 2 Glasses of wine per week     Comment: every other day    Drug use: No       Review of Systems   Constitutional:  Negative for chills and fever.   Respiratory:  Negative for cough and shortness of breath.    Cardiovascular:  Negative for chest pain and palpitations.   Gastrointestinal:  Negative for constipation, diarrhea, nausea and vomiting.   Genitourinary:  Negative for dysuria and hematuria.   Musculoskeletal:  Negative for falls.   Neurological:  Negative for headaches.      Outpatient Encounter Medications as of 2023   Medication Sig Dispense Refill    biotin 1 mg tablet Take 1,000 mcg by mouth 3 (three) times daily.      cholecalciferol, vitamin D3, (VITAMIN D3) 25 mcg (1,000 unit) capsule Take 1 capsule (1,000 Units total) by mouth  "once daily. 30 capsule 11    fexofenadine (ALLEGRA) 30 MG tablet Take 30 mg by mouth once daily.      ibuprofen (ADVIL,MOTRIN) 800 MG tablet Take 1 tablet (800 mg total) by mouth 3 (three) times daily. 60 tablet 1    traZODone (DESYREL) 50 MG tablet Take 1 tablet (50 mg total) by mouth every evening. 90 tablet 3    vit C/E/zinc ox/lester/lut/zeax (ICAPS AREDS2 ORAL) Take by mouth.      [DISCONTINUED] gabapentin (NEURONTIN) 100 MG capsule Take 1 capsule (100 mg total) by mouth every evening. (Patient not taking: Reported on 5/5/2023) 30 capsule 11     No facility-administered encounter medications on file as of 5/5/2023.        Review of patient's allergies indicates:  No Known Allergies    Physical Exam      Vital Signs  Pulse: 65  SpO2: 97 %  BP: 126/74  Pain Score:   2  Pain Loc: Finger  Height and Weight  Height: 5' 2" (157.5 cm)  Weight: 65 kg (143 lb 4.8 oz)  BSA (Calculated - sq m): 1.69 sq meters  BMI (Calculated): 26.2  Weight in (lb) to have BMI = 25: 136.4]    Physical Exam  Constitutional:       Appearance: She is well-developed.   HENT:      Head: Normocephalic and atraumatic.   Cardiovascular:      Rate and Rhythm: Normal rate and regular rhythm.      Heart sounds: Normal heart sounds. No murmur heard.  Pulmonary:      Effort: Pulmonary effort is normal. No respiratory distress.      Breath sounds: Normal breath sounds.   Abdominal:      General: There is no distension.      Palpations: Abdomen is soft.      Tenderness: There is no abdominal tenderness. There is no guarding.   Skin:     General: Skin is warm and dry.   Neurological:      Mental Status: She is alert. Mental status is at baseline.   Psychiatric:         Behavior: Behavior normal.        Laboratory:  CBC:  No results for input(s): WBC, RBC, HGB, HCT, PLT, MCV, MCH, MCHC in the last 2160 hours.  CMP:  No results for input(s): GLU, CALCIUM, ALBUMIN, PROT, NA, K, CO2, CL, BUN, ALKPHOS, ALT, AST, BILITOT in the last 2160 hours.    Invalid " input(s): CREATININ  URINALYSIS:  No results for input(s): COLORU, CLARITYU, SPECGRAV, PHUR, PROTEINUA, GLUCOSEU, BILIRUBINCON, BLOODU, WBCU, RBCU, BACTERIA, MUCUS, NITRITE, LEUKOCYTESUR, UROBILINOGEN, HYALINECASTS in the last 2160 hours.   LIPIDS:  No results for input(s): TSH, HDL, CHOL, TRIG, LDLCALC, CHOLHDL, NONHDLCHOL, TOTALCHOLEST in the last 2160 hours.  TSH:  No results for input(s): TSH in the last 2160 hours.  A1C:  No results for input(s): HGBA1C in the last 2160 hours.    Radiology:  DXA Bone Density Axial Skeleton 1 or more sites    Result Date: 5/1/2023  EXAMINATION:  DXA BONE DENSITY AXIAL SKELETON 1 OR MORE SITES    CLINICAL HISTORY:  Asymptomatic menopausal state    TECHNIQUE:  DXA scanning was performed over the hips and lumbar spine.  Review of the images confirms satisfactory positioning and technique.    COMPARISON:  None    FINDINGS:  The L1 to L4 vertebral bone mineral density is equal to 1.133 g/cm squared with a T score of -0.5.    The left femoral neck bone mineral density is equal to 0.984 g/cm squared with a T score of -0.4.    The right femoral neck bone mineral density is equal to 0.901 g/cm squared with a T score of -1.0.    There is a 4.4% risk of a major osteoporotic fracture and a 0.3% risk of hip fracture in the next 10 years (FRAX).           Assessment/Plan     Luisa Padilla is a 63 y.o.female with:    1. Mixed hyperlipidemia  - CBC Auto Differential; Future  - Lipid Panel; Future  - Comprehensive Metabolic Panel; Future  - CBC Auto Differential  - Lipid Panel  - Comprehensive Metabolic Panel    2. Prediabetes  - Hemoglobin A1C; Future  - Hemoglobin A1C    3. Encounter for screening mammogram for malignant neoplasm of breast  - Mammo Digital Screening Bilat w/ [facility]; Future    4. Primary insomnia    5. Overweight    6. Sacroiliitis    -labs ordered  -MMG ordered  -Continue current medications and maintain follow up with specialists.    -Follow up in about 1 year (around  5/5/2024) for follow up of medical problems.       Irina Nelson MD  [facility]

## 2023-05-06 LAB
ALBUMIN SERPL-MCNC: 4.4 G/DL (ref 3.6–5.1)
ALBUMIN/GLOB SERPL: 1.9 (CALC) (ref 1–2.5)
ALP SERPL-CCNC: 52 U/L (ref 37–153)
ALT SERPL-CCNC: 35 U/L (ref 6–29)
AST SERPL-CCNC: 29 U/L (ref 10–35)
BASOPHILS # BLD AUTO: 40 CELLS/UL (ref 0–200)
BASOPHILS NFR BLD AUTO: 1 %
BILIRUB SERPL-MCNC: 0.5 MG/DL (ref 0.2–1.2)
BUN SERPL-MCNC: 16 MG/DL (ref 7–25)
BUN/CREAT SERPL: ABNORMAL (CALC) (ref 6–22)
CALCIUM SERPL-MCNC: 9.1 MG/DL (ref 8.6–10.4)
CHLORIDE SERPL-SCNC: 106 MMOL/L (ref 98–110)
CHOLEST SERPL-MCNC: 208 MG/DL
CHOLEST/HDLC SERPL: 2.7 (CALC)
CO2 SERPL-SCNC: 28 MMOL/L (ref 20–32)
CREAT SERPL-MCNC: 0.73 MG/DL (ref 0.5–1.05)
EGFR: 91 ML/MIN/1.73M2
EOSINOPHIL # BLD AUTO: 120 CELLS/UL (ref 15–500)
EOSINOPHIL NFR BLD AUTO: 3 %
ERYTHROCYTE [DISTWIDTH] IN BLOOD BY AUTOMATED COUNT: 13.7 % (ref 11–15)
GLOBULIN SER CALC-MCNC: 2.3 G/DL (CALC) (ref 1.9–3.7)
GLUCOSE SERPL-MCNC: 102 MG/DL (ref 65–99)
HBA1C MFR BLD: 5.5 % OF TOTAL HGB
HCT VFR BLD AUTO: 41.1 % (ref 35–45)
HDLC SERPL-MCNC: 77 MG/DL
HGB BLD-MCNC: 13.5 G/DL (ref 11.7–15.5)
LDLC SERPL CALC-MCNC: 114 MG/DL (CALC)
LYMPHOCYTES # BLD AUTO: 1104 CELLS/UL (ref 850–3900)
LYMPHOCYTES NFR BLD AUTO: 27.6 %
MCH RBC QN AUTO: 30.5 PG (ref 27–33)
MCHC RBC AUTO-ENTMCNC: 32.8 G/DL (ref 32–36)
MCV RBC AUTO: 92.8 FL (ref 80–100)
MONOCYTES # BLD AUTO: 340 CELLS/UL (ref 200–950)
MONOCYTES NFR BLD AUTO: 8.5 %
NEUTROPHILS # BLD AUTO: 2396 CELLS/UL (ref 1500–7800)
NEUTROPHILS NFR BLD AUTO: 59.9 %
NONHDLC SERPL-MCNC: 131 MG/DL (CALC)
PLATELET # BLD AUTO: 186 THOUSAND/UL (ref 140–400)
PMV BLD REES-ECKER: 10.6 FL (ref 7.5–12.5)
POTASSIUM SERPL-SCNC: 4.1 MMOL/L (ref 3.5–5.3)
PROT SERPL-MCNC: 6.7 G/DL (ref 6.1–8.1)
RBC # BLD AUTO: 4.43 MILLION/UL (ref 3.8–5.1)
SODIUM SERPL-SCNC: 141 MMOL/L (ref 135–146)
TRIGL SERPL-MCNC: 73 MG/DL
WBC # BLD AUTO: 4 THOUSAND/UL (ref 3.8–10.8)

## 2023-05-24 ENCOUNTER — APPOINTMENT (OUTPATIENT)
Dept: RADIOLOGY | Facility: OTHER | Age: 63
End: 2023-05-24
Attending: INTERNAL MEDICINE
Payer: MEDICARE

## 2023-05-24 VITALS — HEIGHT: 62 IN | BODY MASS INDEX: 26.37 KG/M2 | WEIGHT: 143.31 LBS

## 2023-05-24 DIAGNOSIS — Z12.31 ENCOUNTER FOR SCREENING MAMMOGRAM FOR MALIGNANT NEOPLASM OF BREAST: ICD-10-CM

## 2023-05-24 PROCEDURE — 77063 MAMMO DIGITAL SCREENING BILAT WITH TOMO: ICD-10-PCS | Mod: 26,,, | Performed by: RADIOLOGY

## 2023-05-24 PROCEDURE — 77063 BREAST TOMOSYNTHESIS BI: CPT | Mod: 26,,, | Performed by: RADIOLOGY

## 2023-05-24 PROCEDURE — 77067 SCR MAMMO BI INCL CAD: CPT | Mod: TC,PN

## 2023-05-24 PROCEDURE — 77067 MAMMO DIGITAL SCREENING BILAT WITH TOMO: ICD-10-PCS | Mod: 26,,, | Performed by: RADIOLOGY

## 2023-05-24 PROCEDURE — 77067 SCR MAMMO BI INCL CAD: CPT | Mod: 26,,, | Performed by: RADIOLOGY

## 2023-06-19 ENCOUNTER — TELEPHONE (OUTPATIENT)
Dept: OPHTHALMOLOGY | Facility: CLINIC | Age: 63
End: 2023-06-19
Payer: MEDICARE

## 2023-06-19 NOTE — TELEPHONE ENCOUNTER
----- Message from Mitzi Kong sent at 6/19/2023  2:58 PM CDT -----  Contact: pt @ [phone]  Luisa Padilla calling regarding Appointment Access  (message) for #pt is calling to get appt for annual, asking for call back

## 2023-06-20 ENCOUNTER — TELEPHONE (OUTPATIENT)
Dept: OPHTHALMOLOGY | Facility: CLINIC | Age: 63
End: 2023-06-20
Payer: MEDICARE

## 2023-06-20 NOTE — TELEPHONE ENCOUNTER
----- Message from Antonio Melvin sent at 6/20/2023  4:36 PM CDT -----  Contact: [phone]  Pt is calling to schedule an appt with Dr. Copeland at [facility] due to an optometrist discovering a wrinkle in her retina. Please call back to further assist.

## 2023-06-21 ENCOUNTER — PATIENT MESSAGE (OUTPATIENT)
Dept: OPHTHALMOLOGY | Facility: CLINIC | Age: 63
End: 2023-06-21
Payer: MEDICARE

## 2023-06-21 ENCOUNTER — TELEPHONE (OUTPATIENT)
Dept: OPHTHALMOLOGY | Facility: CLINIC | Age: 63
End: 2023-06-21
Payer: MEDICARE

## 2023-06-22 ENCOUNTER — TELEPHONE (OUTPATIENT)
Dept: OPHTHALMOLOGY | Facility: CLINIC | Age: 63
End: 2023-06-22
Payer: MEDICARE

## 2023-06-22 NOTE — TELEPHONE ENCOUNTER
----- Message from Antonio Melvin sent at 6/22/2023  2:42 PM CDT -----  Contact: [phone]  Pt returning your call from yesterday to schedule with Dr. Copeland at [facility]. Please call back to further assist.

## 2023-06-26 ENCOUNTER — OFFICE VISIT (OUTPATIENT)
Dept: ORTHOPEDICS | Facility: CLINIC | Age: 63
End: 2023-06-26
Payer: MEDICARE

## 2023-06-26 DIAGNOSIS — M18.12 PRIMARY OSTEOARTHRITIS OF FIRST CARPOMETACARPAL JOINT OF LEFT HAND: ICD-10-CM

## 2023-06-26 DIAGNOSIS — G89.29 CHRONIC PAIN OF LEFT HAND: ICD-10-CM

## 2023-06-26 DIAGNOSIS — M65.319 STENOSING TENOSYNOVITIS OF THUMB: Primary | ICD-10-CM

## 2023-06-26 DIAGNOSIS — M79.642 CHRONIC PAIN OF LEFT HAND: ICD-10-CM

## 2023-06-26 PROCEDURE — 99999 PR PBB SHADOW E&M-EST. PATIENT-LVL III: CPT | Mod: PBBFAC,,, | Performed by: ORTHOPAEDIC SURGERY

## 2023-06-26 PROCEDURE — 1160F RVW MEDS BY RX/DR IN RCRD: CPT | Mod: CPTII,S$GLB,, | Performed by: ORTHOPAEDIC SURGERY

## 2023-06-26 PROCEDURE — 3044F PR MOST RECENT HEMOGLOBIN A1C LEVEL <7.0%: ICD-10-PCS | Mod: CPTII,S$GLB,, | Performed by: ORTHOPAEDIC SURGERY

## 2023-06-26 PROCEDURE — 1101F PR PT FALLS ASSESS DOC 0-1 FALLS W/OUT INJ PAST YR: ICD-10-PCS | Mod: CPTII,S$GLB,, | Performed by: ORTHOPAEDIC SURGERY

## 2023-06-26 PROCEDURE — 1159F MED LIST DOCD IN RCRD: CPT | Mod: CPTII,S$GLB,, | Performed by: ORTHOPAEDIC SURGERY

## 2023-06-26 PROCEDURE — 1101F PT FALLS ASSESS-DOCD LE1/YR: CPT | Mod: CPTII,S$GLB,, | Performed by: ORTHOPAEDIC SURGERY

## 2023-06-26 PROCEDURE — 3288F PR FALLS RISK ASSESSMENT DOCUMENTED: ICD-10-PCS | Mod: CPTII,S$GLB,, | Performed by: ORTHOPAEDIC SURGERY

## 2023-06-26 PROCEDURE — 99204 OFFICE O/P NEW MOD 45 MIN: CPT | Mod: S$GLB,,, | Performed by: ORTHOPAEDIC SURGERY

## 2023-06-26 PROCEDURE — 3044F HG A1C LEVEL LT 7.0%: CPT | Mod: CPTII,S$GLB,, | Performed by: ORTHOPAEDIC SURGERY

## 2023-06-26 PROCEDURE — 99999 PR PBB SHADOW E&M-EST. PATIENT-LVL III: ICD-10-PCS | Mod: PBBFAC,,, | Performed by: ORTHOPAEDIC SURGERY

## 2023-06-26 PROCEDURE — 1160F PR REVIEW ALL MEDS BY PRESCRIBER/CLIN PHARMACIST DOCUMENTED: ICD-10-PCS | Mod: CPTII,S$GLB,, | Performed by: ORTHOPAEDIC SURGERY

## 2023-06-26 PROCEDURE — 1159F PR MEDICATION LIST DOCUMENTED IN MEDICAL RECORD: ICD-10-PCS | Mod: CPTII,S$GLB,, | Performed by: ORTHOPAEDIC SURGERY

## 2023-06-26 PROCEDURE — 3288F FALL RISK ASSESSMENT DOCD: CPT | Mod: CPTII,S$GLB,, | Performed by: ORTHOPAEDIC SURGERY

## 2023-06-26 PROCEDURE — 99204 PR OFFICE/OUTPT VISIT, NEW, LEVL IV, 45-59 MIN: ICD-10-PCS | Mod: S$GLB,,, | Performed by: ORTHOPAEDIC SURGERY

## 2023-06-26 NOTE — H&P (VIEW-ONLY)
[facility]  History & Physical  Orthopedics    SUBJECTIVE:      COVID-19 attestation:  This patient was treated during the COVID- pandemic.  This was discussed with the patient, they are aware of our current policies and procedures, were given the option of delaying their visit and or switching to a virtual visit, delaying their surgery when applicable, and they elect to proceed.    Chief Complaint:   Chief Complaint   Patient presents with    left hand       Referring Provider: none    History of Present Illness:  Patient is a 63 y.o. right hand dominant female who presents today with complaints of left trigger thumb pain. She saw Donna Connors PA-C 23 and was not ready for an injection at that time. She is unable to bend at the IPJ since February, experiencing stiffness now more than triggering. She has tried icing, massage, supervised HEP. She denies numbness/tingling.    The patient is a .     The patient denies any fevers, chills, N/V, D/C and presents for evaluation.       Past Medical History:   Diagnosis Date    BRCA gene mutation negative     BRCA1 negative     BRCA2 negative     Cataract     Epiretinal membrane (ERM) of left eye     Menopause     HOLLAND (obstructive sleep apnea)     mild, no need of CPAP    Seasonal allergies      Past Surgical History:   Procedure Laterality Date     SECTION      x 2    COLONOSCOPY N/A 4/10/2019    Procedure: COLONOSCOPY;  Surgeon: Lauren Lopez MD;  Location: [facility];  Service: Endoscopy;  Laterality: N/A;    EYE SURGERY Left     VITRECTOMY BY PARS PLANA APPROACH Left 2020    Procedure: VITRECTOMY, PARS PLANA APPROACH;  Surgeon: QUINTIN Copeland MD;  Location: [address];  Service: Ophthalmology;  Laterality: Left;  30 min     Review of patient's allergies indicates:  No Known Allergies  Social History     Social History Narrative    Not on file     Family History   Problem Relation Age of Onset     Breast cancer Sister 48    BRCA 1/2 Sister     Breast cancer Paternal Aunt     No Known Problems Mother     Pulmonary fibrosis Father         Cardiac Drug induced    No Known Problems Brother     Thyroid disease Maternal Grandmother     Thyroid nodules Cousin     Colon cancer Neg Hx     Ovarian cancer Neg Hx          Current Outpatient Medications:     biotin 1 mg tablet, Take 1,000 mcg by mouth 3 (three) times daily., Disp: , Rfl:     cholecalciferol, vitamin D3, (VITAMIN D3) 25 mcg (1,000 unit) capsule, Take 1 capsule (1,000 Units total) by mouth once daily., Disp: 30 capsule, Rfl: 11    fexofenadine (ALLEGRA) 30 MG tablet, Take 30 mg by mouth once daily., Disp: , Rfl:     ibuprofen (ADVIL,MOTRIN) 800 MG tablet, Take 1 tablet (800 mg total) by mouth 3 (three) times daily., Disp: 60 tablet, Rfl: 1    traZODone (DESYREL) 50 MG tablet, Take 1 tablet (50 mg total) by mouth every evening., Disp: 90 tablet, Rfl: 3    vit C/E/zinc ox/lester/lut/zeax (ICAPS AREDS2 ORAL), Take by mouth., Disp: , Rfl:     ROS    Review of Systems:  Constitutional: no fever or chills  Eyes: no visual changes  ENT: no nasal congestion or sore throat  Respiratory: no cough or shortness of breath  Cardiovascular: no chest pain  Gastrointestinal: no nausea or vomiting, tolerating diet  Musculoskeletal: pain, soreness, and decreased ROM    OBJECTIVE:      Vital Signs (Most Recent):  There were no vitals filed for this visit.  There is no height or weight on file to calculate BMI.    Physical Exam    Physical Exam:  Constitutional: The patient appears well-developed and well-nourished. No distress.   Head: Normocephalic and atraumatic.   Nose: Nose normal.   Eyes: Conjunctivae and EOM are normal.   Neck: No tracheal deviation present.   Cardiovascular: Normal rate and intact distal pulses.    Pulmonary/Chest: Effort normal. No respiratory distress.   Abdominal: There is no guarding.   Lymphatic: Negative for adenopathy   Neurological: The patient  is alert.   Psychiatric: The patient has a normal mood and affect.     Bilateral Hand/Wrist Examination:    Observation/Inspection:  Swelling  Left thumb    Deformity  none  Discoloration  none     Scars   none    Atrophy  none    HAND/WRIST EXAMINATION:    Decreased AROM and PROM left thumb IP joint, swelling along FPL tendon, decreased excursion. Mild TTP left thumb CMC joint, + grind test. Full range of motion other fingers.  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     ASSESSMENT/PLAN:      63 y.o. yo female with   Encounter Diagnoses   Name Primary?    Stenosing tenosynovitis of thumb Yes    Chronic pain of left hand     Primary osteoarthritis of first carpometacarpal joint of left hand       Plan:  We have discussed the natural history of stenosing flexor tenosynovitis and basilar thumb arthritis including treatment options such as splinting, oral and topical anti-inflammatories, cortisone injections and surgery.     We have discussed conservative vs. surgical treatment as well as risks, benefits and alternatives for left thumb stenosing tenosynovitis.  Conservative measures have been exhausted and she would like to proceed with surgery. Surgery would include left thumb flexor tenosynovectomy. Consent signed today in clinic. Light use of the hand will be indicated for the first 4-6 weeks.     We have discussed risks of hand surgery which include but are not limited to blood clots in the legs that can travel to the lungs (pulmonary embolism). Pulmonary embolism can cause shortness of breath, chest pain, and even shock. Other risks include urinary tract infection, nausea and vomiting (usually related to pain  medication), chronic pain, bleeding, nerve damage, blood vessel injury, scarring and infection of the hand which can require re-operation. Furthermore, the risks of anesthesia include potential heart, lung, kidney, and liver damage.  Informed consent was obtained.  The patient understands and would like to proceed with surgery in the near future.    The patient's pathophysiology was explained in detail with reference to x-rays, models, other visual aids as appropriate.  Treatment options were discussed in detail.  Questions were invited and answered to the patient's satisfaction. I reviewed Primary care , and other specialty's notes to better coordinate patient's care.          Ale Elkins MD    Please be aware that this note has been generated with the assistance of MModal voice-to-text.  Please excuse any spelling or grammatical errors.

## 2023-06-26 NOTE — PROGRESS NOTES
[facility]  History & Physical  Orthopedics    SUBJECTIVE:      COVID-19 attestation:  This patient was treated during the COVID- pandemic.  This was discussed with the patient, they are aware of our current policies and procedures, were given the option of delaying their visit and or switching to a virtual visit, delaying their surgery when applicable, and they elect to proceed.    Chief Complaint:   Chief Complaint   Patient presents with    left hand       Referring Provider: none    History of Present Illness:  Patient is a 63 y.o. right hand dominant female who presents today with complaints of left trigger thumb pain. She saw Donna Connors PA-C 23 and was not ready for an injection at that time. She is unable to bend at the IPJ since February, experiencing stiffness now more than triggering. She has tried icing, massage, supervised HEP. She denies numbness/tingling.    The patient is a .     The patient denies any fevers, chills, N/V, D/C and presents for evaluation.       Past Medical History:   Diagnosis Date    BRCA gene mutation negative     BRCA1 negative     BRCA2 negative     Cataract     Epiretinal membrane (ERM) of left eye     Menopause     HOLLAND (obstructive sleep apnea)     mild, no need of CPAP    Seasonal allergies      Past Surgical History:   Procedure Laterality Date     SECTION      x 2    COLONOSCOPY N/A 4/10/2019    Procedure: COLONOSCOPY;  Surgeon: Lauren Lopez MD;  Location: [facility];  Service: Endoscopy;  Laterality: N/A;    EYE SURGERY Left     VITRECTOMY BY PARS PLANA APPROACH Left 2020    Procedure: VITRECTOMY, PARS PLANA APPROACH;  Surgeon: QUINTIN Copeland MD;  Location: [address];  Service: Ophthalmology;  Laterality: Left;  30 min     Review of patient's allergies indicates:  No Known Allergies  Social History     Social History Narrative    Not on file     Family History   Problem Relation Age of Onset     Breast cancer Sister 48    BRCA 1/2 Sister     Breast cancer Paternal Aunt     No Known Problems Mother     Pulmonary fibrosis Father         Cardiac Drug induced    No Known Problems Brother     Thyroid disease Maternal Grandmother     Thyroid nodules Cousin     Colon cancer Neg Hx     Ovarian cancer Neg Hx          Current Outpatient Medications:     biotin 1 mg tablet, Take 1,000 mcg by mouth 3 (three) times daily., Disp: , Rfl:     cholecalciferol, vitamin D3, (VITAMIN D3) 25 mcg (1,000 unit) capsule, Take 1 capsule (1,000 Units total) by mouth once daily., Disp: 30 capsule, Rfl: 11    fexofenadine (ALLEGRA) 30 MG tablet, Take 30 mg by mouth once daily., Disp: , Rfl:     ibuprofen (ADVIL,MOTRIN) 800 MG tablet, Take 1 tablet (800 mg total) by mouth 3 (three) times daily., Disp: 60 tablet, Rfl: 1    traZODone (DESYREL) 50 MG tablet, Take 1 tablet (50 mg total) by mouth every evening., Disp: 90 tablet, Rfl: 3    vit C/E/zinc ox/lester/lut/zeax (ICAPS AREDS2 ORAL), Take by mouth., Disp: , Rfl:     ROS    Review of Systems:  Constitutional: no fever or chills  Eyes: no visual changes  ENT: no nasal congestion or sore throat  Respiratory: no cough or shortness of breath  Cardiovascular: no chest pain  Gastrointestinal: no nausea or vomiting, tolerating diet  Musculoskeletal: pain, soreness, and decreased ROM    OBJECTIVE:      Vital Signs (Most Recent):  There were no vitals filed for this visit.  There is no height or weight on file to calculate BMI.    Physical Exam    Physical Exam:  Constitutional: The patient appears well-developed and well-nourished. No distress.   Head: Normocephalic and atraumatic.   Nose: Nose normal.   Eyes: Conjunctivae and EOM are normal.   Neck: No tracheal deviation present.   Cardiovascular: Normal rate and intact distal pulses.    Pulmonary/Chest: Effort normal. No respiratory distress.   Abdominal: There is no guarding.   Lymphatic: Negative for adenopathy   Neurological: The patient  is alert.   Psychiatric: The patient has a normal mood and affect.     Bilateral Hand/Wrist Examination:    Observation/Inspection:  Swelling  Left thumb    Deformity  none  Discoloration  none     Scars   none    Atrophy  none    HAND/WRIST EXAMINATION:    Decreased AROM and PROM left thumb IP joint, swelling along FPL tendon, decreased excursion. Mild TTP left thumb CMC joint, + grind test. Full range of motion other fingers.  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     ASSESSMENT/PLAN:      63 y.o. yo female with   Encounter Diagnoses   Name Primary?    Stenosing tenosynovitis of thumb Yes    Chronic pain of left hand     Primary osteoarthritis of first carpometacarpal joint of left hand       Plan:  We have discussed the natural history of stenosing flexor tenosynovitis and basilar thumb arthritis including treatment options such as splinting, oral and topical anti-inflammatories, cortisone injections and surgery.     We have discussed conservative vs. surgical treatment as well as risks, benefits and alternatives for left thumb stenosing tenosynovitis.  Conservative measures have been exhausted and she would like to proceed with surgery. Surgery would include left thumb flexor tenosynovectomy. Consent signed today in clinic. Light use of the hand will be indicated for the first 4-6 weeks.     We have discussed risks of hand surgery which include but are not limited to blood clots in the legs that can travel to the lungs (pulmonary embolism). Pulmonary embolism can cause shortness of breath, chest pain, and even shock. Other risks include urinary tract infection, nausea and vomiting (usually related to pain  medication), chronic pain, bleeding, nerve damage, blood vessel injury, scarring and infection of the hand which can require re-operation. Furthermore, the risks of anesthesia include potential heart, lung, kidney, and liver damage.  Informed consent was obtained.  The patient understands and would like to proceed with surgery in the near future.    The patient's pathophysiology was explained in detail with reference to x-rays, models, other visual aids as appropriate.  Treatment options were discussed in detail.  Questions were invited and answered to the patient's satisfaction. I reviewed Primary care , and other specialty's notes to better coordinate patient's care.          Ale Elkins MD    Please be aware that this note has been generated with the assistance of MModal voice-to-text.  Please excuse any spelling or grammatical errors.

## 2023-07-13 RX ORDER — MUPIROCIN 20 MG/G
OINTMENT TOPICAL
Status: CANCELLED | OUTPATIENT
Start: 2023-07-13

## 2023-07-13 RX ORDER — CEFAZOLIN SODIUM 2 G/50ML
2 SOLUTION INTRAVENOUS
Status: CANCELLED | OUTPATIENT
Start: 2023-07-13

## 2023-07-14 ENCOUNTER — ANESTHESIA EVENT (OUTPATIENT)
Dept: SURGERY | Facility: HOSPITAL | Age: 63
End: 2023-07-14
Payer: MEDICARE

## 2023-07-17 ENCOUNTER — TELEPHONE (OUTPATIENT)
Dept: ORTHOPEDICS | Facility: CLINIC | Age: 63
End: 2023-07-17
Payer: MEDICARE

## 2023-07-17 RX ORDER — TRAMADOL HYDROCHLORIDE 50 MG/1
50 TABLET ORAL EVERY 6 HOURS
Qty: 25 TABLET | Refills: 0 | Status: SHIPPED | OUTPATIENT
Start: 2023-07-17 | End: 2024-02-05

## 2023-07-17 RX ORDER — ONDANSETRON HYDROCHLORIDE 8 MG/1
8 TABLET, FILM COATED ORAL EVERY 8 HOURS PRN
Qty: 25 TABLET | Refills: 0 | Status: SHIPPED | OUTPATIENT
Start: 2023-07-17 | End: 2024-02-05

## 2023-07-17 NOTE — TELEPHONE ENCOUNTER
Spoke to patient and informed her of her arrival time (8am), location of surgery ([facility]), instructions for pre-wash, and reviewed no eating or drinking after midnight, confirmed call in regards to medication instructions, informed pt no uber or lyft after sx must have ride, if applicable remove any polish, and bring any paperwork given at pre op to sx.

## 2023-07-18 ENCOUNTER — ANESTHESIA (OUTPATIENT)
Dept: SURGERY | Facility: HOSPITAL | Age: 63
End: 2023-07-18
Payer: MEDICARE

## 2023-07-18 ENCOUNTER — HOSPITAL ENCOUNTER (OUTPATIENT)
Facility: HOSPITAL | Age: 63
Discharge: HOME | End: 2023-07-18
Attending: ORTHOPAEDIC SURGERY | Admitting: ORTHOPAEDIC SURGERY
Payer: MEDICARE

## 2023-07-18 DIAGNOSIS — M65.319 STENOSING TENOSYNOVITIS OF THUMB: Primary | ICD-10-CM

## 2023-07-18 PROCEDURE — 25000003 PHARM REV CODE 250: Performed by: STUDENT IN AN ORGANIZED HEALTH CARE EDUCATION/TRAINING PROGRAM

## 2023-07-18 PROCEDURE — 99900035 HC TECH TIME PER 15 MIN (STAT)

## 2023-07-18 PROCEDURE — 63600175 PHARM REV CODE 636 W HCPCS: Performed by: ORTHOPAEDIC SURGERY

## 2023-07-18 PROCEDURE — 71000015 HC POSTOP RECOV 1ST HR: Performed by: ORTHOPAEDIC SURGERY

## 2023-07-18 PROCEDURE — D9220A PRA ANESTHESIA: ICD-10-PCS | Mod: ANES,,, | Performed by: ANESTHESIOLOGY

## 2023-07-18 PROCEDURE — 63600175 PHARM REV CODE 636 W HCPCS: Performed by: NURSE ANESTHETIST, CERTIFIED REGISTERED

## 2023-07-18 PROCEDURE — 36000706: Performed by: ORTHOPAEDIC SURGERY

## 2023-07-18 PROCEDURE — 25000003 PHARM REV CODE 250: Performed by: NURSE ANESTHETIST, CERTIFIED REGISTERED

## 2023-07-18 PROCEDURE — 25000003 PHARM REV CODE 250: Performed by: ORTHOPAEDIC SURGERY

## 2023-07-18 PROCEDURE — 36000707: Performed by: ORTHOPAEDIC SURGERY

## 2023-07-18 PROCEDURE — 27201423 OPTIME MED/SURG SUP & DEVICES STERILE SUPPLY: Performed by: ORTHOPAEDIC SURGERY

## 2023-07-18 PROCEDURE — D9220A PRA ANESTHESIA: Mod: CRNA,,, | Performed by: NURSE ANESTHETIST, CERTIFIED REGISTERED

## 2023-07-18 PROCEDURE — 26145 PR RAD EXCIS JT LINING,FLEXOR,EACH: ICD-10-PCS | Mod: LT,,, | Performed by: ORTHOPAEDIC SURGERY

## 2023-07-18 PROCEDURE — 71000033 HC RECOVERY, INTIAL HOUR: Performed by: ORTHOPAEDIC SURGERY

## 2023-07-18 PROCEDURE — D9220A PRA ANESTHESIA: Mod: ANES,,, | Performed by: ANESTHESIOLOGY

## 2023-07-18 PROCEDURE — D9220A PRA ANESTHESIA: ICD-10-PCS | Mod: CRNA,,, | Performed by: NURSE ANESTHETIST, CERTIFIED REGISTERED

## 2023-07-18 PROCEDURE — 26145 TENDON EXCISION PALM/FINGER: CPT | Mod: LT,,, | Performed by: ORTHOPAEDIC SURGERY

## 2023-07-18 PROCEDURE — 94761 N-INVAS EAR/PLS OXIMETRY MLT: CPT

## 2023-07-18 PROCEDURE — 37000008 HC ANESTHESIA 1ST 15 MINUTES: Performed by: ORTHOPAEDIC SURGERY

## 2023-07-18 PROCEDURE — 37000009 HC ANESTHESIA EA ADD 15 MINS: Performed by: ORTHOPAEDIC SURGERY

## 2023-07-18 RX ORDER — PROPOFOL 10 MG/ML
VIAL (ML) INTRAVENOUS CONTINUOUS PRN
Status: DISCONTINUED | OUTPATIENT
Start: 2023-07-18 | End: 2023-07-18

## 2023-07-18 RX ORDER — MIDAZOLAM HYDROCHLORIDE 1 MG/ML
INJECTION, SOLUTION INTRAMUSCULAR; INTRAVENOUS
Status: DISCONTINUED | OUTPATIENT
Start: 2023-07-18 | End: 2023-07-18

## 2023-07-18 RX ORDER — HYDROCODONE BITARTRATE AND ACETAMINOPHEN 5; 325 MG/1; MG/1
1 TABLET ORAL EVERY 4 HOURS PRN
Status: DISCONTINUED | OUTPATIENT
Start: 2023-07-18 | End: 2023-07-18 | Stop reason: HOSPADM

## 2023-07-18 RX ORDER — CELECOXIB 200 MG/1
400 CAPSULE ORAL ONCE
Status: COMPLETED | OUTPATIENT
Start: 2023-07-18 | End: 2023-07-18

## 2023-07-18 RX ORDER — LIDOCAINE HYDROCHLORIDE 10 MG/ML
INJECTION, SOLUTION EPIDURAL; INFILTRATION; INTRACAUDAL; PERINEURAL
Status: DISCONTINUED | OUTPATIENT
Start: 2023-07-18 | End: 2023-07-18 | Stop reason: HOSPADM

## 2023-07-18 RX ORDER — OXYCODONE HYDROCHLORIDE 5 MG/1
5 TABLET ORAL
Status: DISCONTINUED | OUTPATIENT
Start: 2023-07-18 | End: 2023-07-18 | Stop reason: HOSPADM

## 2023-07-18 RX ORDER — PROPOFOL 10 MG/ML
VIAL (ML) INTRAVENOUS
Status: DISCONTINUED | OUTPATIENT
Start: 2023-07-18 | End: 2023-07-18

## 2023-07-18 RX ORDER — ONDANSETRON 2 MG/ML
INJECTION INTRAMUSCULAR; INTRAVENOUS
Status: DISCONTINUED | OUTPATIENT
Start: 2023-07-18 | End: 2023-07-18

## 2023-07-18 RX ORDER — SODIUM CHLORIDE 0.9 % (FLUSH) 0.9 %
10 SYRINGE (ML) INJECTION
Status: DISCONTINUED | OUTPATIENT
Start: 2023-07-18 | End: 2023-07-18 | Stop reason: HOSPADM

## 2023-07-18 RX ORDER — FENTANYL CITRATE 50 UG/ML
INJECTION, SOLUTION INTRAMUSCULAR; INTRAVENOUS
Status: DISCONTINUED | OUTPATIENT
Start: 2023-07-18 | End: 2023-07-18

## 2023-07-18 RX ORDER — BUPIVACAINE HYDROCHLORIDE 2.5 MG/ML
INJECTION, SOLUTION EPIDURAL; INFILTRATION; INTRACAUDAL
Status: DISCONTINUED | OUTPATIENT
Start: 2023-07-18 | End: 2023-07-18 | Stop reason: HOSPADM

## 2023-07-18 RX ORDER — ACETAMINOPHEN 500 MG
1000 TABLET ORAL
Status: COMPLETED | OUTPATIENT
Start: 2023-07-18 | End: 2023-07-18

## 2023-07-18 RX ORDER — BACITRACIN ZINC 500 UNIT/G
OINTMENT (GRAM) TOPICAL
Status: DISCONTINUED | OUTPATIENT
Start: 2023-07-18 | End: 2023-07-18 | Stop reason: HOSPADM

## 2023-07-18 RX ORDER — MUPIROCIN 20 MG/G
OINTMENT TOPICAL 2 TIMES DAILY
Status: DISCONTINUED | OUTPATIENT
Start: 2023-07-18 | End: 2023-07-18 | Stop reason: HOSPADM

## 2023-07-18 RX ORDER — LIDOCAINE HYDROCHLORIDE 20 MG/ML
INJECTION INTRAVENOUS
Status: DISCONTINUED | OUTPATIENT
Start: 2023-07-18 | End: 2023-07-18

## 2023-07-18 RX ORDER — FENTANYL CITRATE 50 UG/ML
25 INJECTION, SOLUTION INTRAMUSCULAR; INTRAVENOUS EVERY 5 MIN PRN
Status: DISCONTINUED | OUTPATIENT
Start: 2023-07-18 | End: 2023-07-18 | Stop reason: HOSPADM

## 2023-07-18 RX ORDER — MUPIROCIN 20 MG/G
OINTMENT TOPICAL
Status: DISCONTINUED | OUTPATIENT
Start: 2023-07-18 | End: 2023-07-18 | Stop reason: HOSPADM

## 2023-07-18 RX ORDER — ONDANSETRON 2 MG/ML
4 INJECTION INTRAMUSCULAR; INTRAVENOUS DAILY PRN
Status: DISCONTINUED | OUTPATIENT
Start: 2023-07-18 | End: 2023-07-18 | Stop reason: HOSPADM

## 2023-07-18 RX ORDER — GLUCOSAMINE/CHONDRO SU A 500-400 MG
1 TABLET ORAL 3 TIMES DAILY
COMMUNITY

## 2023-07-18 RX ADMIN — SODIUM CHLORIDE: 0.9 INJECTION, SOLUTION INTRAVENOUS at 10:07

## 2023-07-18 RX ADMIN — CEFAZOLIN 2 G: 2 INJECTION, POWDER, FOR SOLUTION INTRAMUSCULAR; INTRAVENOUS at 10:07

## 2023-07-18 RX ADMIN — CELECOXIB 400 MG: 200 CAPSULE ORAL at 08:07

## 2023-07-18 RX ADMIN — PROPOFOL 50 MG: 10 INJECTION, EMULSION INTRAVENOUS at 10:07

## 2023-07-18 RX ADMIN — MIDAZOLAM HYDROCHLORIDE 2 MG: 1 INJECTION, SOLUTION INTRAMUSCULAR; INTRAVENOUS at 10:07

## 2023-07-18 RX ADMIN — ACETAMINOPHEN 1000 MG: 500 TABLET ORAL at 08:07

## 2023-07-18 RX ADMIN — FENTANYL CITRATE 25 MCG: 50 INJECTION, SOLUTION INTRAMUSCULAR; INTRAVENOUS at 10:07

## 2023-07-18 RX ADMIN — PROPOFOL 75 MCG/KG/MIN: 10 INJECTION, EMULSION INTRAVENOUS at 10:07

## 2023-07-18 RX ADMIN — ONDANSETRON 4 MG: 2 INJECTION INTRAMUSCULAR; INTRAVENOUS at 10:07

## 2023-07-18 RX ADMIN — MUPIROCIN: 20 OINTMENT TOPICAL at 08:07

## 2023-07-18 RX ADMIN — LIDOCAINE HYDROCHLORIDE 60 MG: 20 INJECTION INTRAVENOUS at 10:07

## 2023-07-18 RX ADMIN — PROPOFOL 20 MG: 10 INJECTION, EMULSION INTRAVENOUS at 10:07

## 2023-07-18 NOTE — DISCHARGE INSTRUCTIONS
HAND SURGERY  Care of the Hand after Surgery       Post-Op Care  It is important to follow your orthopaedic surgeon's instructions carefully after you return home. You should ask   someone to check on you that evening. The protocols described here are general in nature. Every person and   every surgery is different so the information given here is for guidance only. If you have questions you should   contact us.     Day of Surgery  The hand may be placed in an ace wrap or a hard splint to protect any surgical repair that was performed at the time of surgery. Do not remove the splint or dressing until you are seen for your first postop appointment.  The hand and fingers may be numb for quite some time after surgery. This is due to the local anesthetic used at the time of surgery for pain control.  Begin taking liquids and food as soon as you can. You should always eat some solid food, a sandwich or light meal, a little while before taking your pain meds.     Day 3  Things are much the same on the third day after your surgery. Usually you have less pain and feel like doing more.  Showering: It is important to keep the incision absolutely dry while showering or bathing (use two plastic bags over your hand). If you feel that the pain medication you were given after surgery is stronger than you really need you can reduce the dose, take it less frequently or switch to ibuprofen or Tylenol. If you received antibiotics they should be taken until the entire prescription is completed.    Day 6  You will be seeing therapy at this time for evaluation and to start range of motion exercises    Day 10  Therapy will take out your sutures at this time    Day 20  We will see you back in in clinc. We will review with you what was done in surgery and will talk about rehab and answer any questions you may have.       HAND SURGERY  You can drive if you are comfortable and have regained full finger movements and if you have sufficient  power to control the vehicle. Timing of your return to work is variable according to your occupation and specific surgery. We should discuss this at your follow up visit.  Hand elevation is important to prevent swelling and stiffness of the fingers. One minute of leaving your hand dangling negates four hours of keeping it elevated. Please remember not to walk with your hand hanging down or to sit with your hand resting in your lap. It is fine, however, to lower your hand for light use and you should get back to normal light activities as soon as possible as guided by common sense. There are a number of exercises you should do to prevent stiffness - you can begin these exercises day 3 postoperatively      Post-operative exercises  Bend your fingers  Relax your hand. Start with your fingers straight and close together. Bend the end and middle joints of your fingers. Keep your wrist and knuckles straight. Moving slowly and smoothly, return your hand to the starting position. Repeat with your other hand. If you can, perform multiple repetitions of this exercise on each hand.    Open your hand wide                      Spread your fingers apart as wide as you can and hold that position. Slowly relax your fingers and bring them together. Return to the open-wide position. Repeat with each hand and gradually add to the number of repetitions.    Make a fist  Start with your fingers straight and spread apart. Make a loose, gentle fist and wrap your thumb around the outside of your fingers. Be careful not to squeeze your fingers together too tightly. Moving slowly and smoothly, return to the starting position. Repeat. Perform this exercise on both hands.    Touch your fingertips  Straighten your fingers and thumb. Bend your thumb across your palm, touching the tip of your thumb to the pad of your hand just below your pinky finger. If you can't make your thumb touch, just stretch as far as you can. Return your thumb to its  "starting position, as shown in images 1 through 3.  For the next exercise, form the letter O by touching your thumb to each fingertip, as shown in images 4 through 6. Moving slowly and smoothly, touch your index finger to your thumb, then straighten your fingers. Touch your middle finger to your thumb and straighten. Follow with your ring and pinky fingers.    Walk your fingers  Rest your hand on a flat surface, such as a tabletop, with your palm facing down and your fingers spread slightly apart. Moving one finger at a time, slowly walk your fingers toward your thumb. Start by lifting and moving your index finger toward your thumb. Follow by lifting and moving your middle finger toward your thumb. Proceed with moving your ring finger and then your pinky finger toward your thumb. Don't move your wrist or thumb while doing this exercise. Repeat with your other hand.            HAND SURGERY    Common Concerns and Frequently Asked Questions      When to Call the Doctor  Pain, burning, or numbness of the fingers or the back of the hand not relieved by elevation of the arm  Pale or cold finger; bluish nail beds  Red line going up the arm  Excessive swelling  Fever over 101.3ºF  Pain unrelieved by pain medication     HAND SURGERY    Tips for one armed living  It helps to have...   In the shower   Plastic bags and rubber bands to cover bandages - the bags that newspapers come in are good to cover the hand and wrist. Otherwise small trash can liners will do. Use two at a time.   Bottle sponge (soft sponge on a long stick) - for the armpit of your "good" hand.   Shower brush   A hair brush in the shower will help you to wash your hair.   Cotton ekta cloth bathrobe - to dry your back.    In the bathroom   Toothpaste, shampoo, etc. in flip-top or pump (not screw top) dispensers.   Consider an electric razor.   Flossers (dental floss on a "Y" shaped handle).    In the kitchen   Dycem mat (rubber jar opener mat) - to help open " "jars, but also keep things from sliding around while you are working on them.    Double suction cup pads ("little Octopus") - to hold items while you use or wash them.   Electric can opener with a lid magnet strong enough to hold the can in the air - for one handed use.   In the bedroom   Back scratcher.   Large sleeve shirts and tops.   Put away clothing which buttons, fastens or snaps in the back or which uses drawstrings.    Sports bra or a camisole instead of a bra.   [employer] nylons can be pulled on one handed - most others can't.   A "wash and wear" haircut.    "

## 2023-07-18 NOTE — PLAN OF CARE
VSS. Patient able to tolerate oral liquids. Patient denies c/o pain. Patient/family received home medication per bedside delivery. Dressing intact. No distress noted. Patient states she is ready for discharge. Discharge instructions reviewed with patient and family, verbalized understanding. IV discontinued with catheter tip intact. Family at bedside to help patient dress. Patient wheeled to lobby via staff.

## 2023-07-18 NOTE — PLAN OF CARE
Pre op complete. Spouse is in the car. Belongings will be placed in locker. Call light given to pt.

## 2023-07-18 NOTE — OP NOTE
[facility])  Surgery Department  Operative Note    SUMMARY     Date of Procedure: 7/18/2023     Procedure:   1. Flexor tenosynovectomy left thumb, cpt 90715    Surgeon(s) and Role:     * Ale Elkins MD - Primary    Assisting Surgeon: None    Pre-Operative Diagnosis: Stenosing flexor tenosynovitis left thumb    Post-Operative Diagnosis: Stenosing flexor tenosynovitis left thumb    Anesthesia: Local MAC    Indication for Procedure:  62 yo female with longstanding left thumb stenosing flexor tenosynovitis that had failed conservative management.  Risks and benefits of the procedure were discussed with the patient and informed consent was obtained.    Description of the Findings of the Procedure: The patient was seen in the preoperative holding area and the left hand was marked. The patient was taken to the OR, placed supine on the table, and a padded hand table was used. After monitored anesthesia care was administered without difficulty, a time-out procedure was performed identifying the patient, the operative site and the procedure to be performed. A tourniquet was placed on the arm and the left upper extremity was prepped and draped in standard sterile fashion. The left upper extremity was elevated and exsanguinated with an Esmarch bandage, and the tourniquet was inflated to 250 mm Hg. 10cc of 1% lidocaine plain and 0.25% bupivacaine was used for a local block of the fingers. A 1 cm incision was made over the A1 pulley of the left thumb. Littler scissors were used to dissect down to the flexor sheath. The A1 pulley was sharply incised, and released both proximally and distally. The oblique and A2 pulleys were protected. There was flexor tenosynovitis on the flexor tendon, this was removed with Littler scissors. The neurovascular bundles were identified and protected throughout the case. The finger were put through a range of motion and there was no longer any catching and locking. The wound was  then irrigated with normal saline using a bulb syringe. 3-0 prolene was used to close the skin in a horizontal mattress fashion.  All instrument, sponge and needle counts were correct. Adaptic, 4x4, cast padding and coban were used to dress the hand. The tourniquet was deflated and the hand and fingers were pink and well-perfused.  The patient tolerated the procedure well.  She was taken awake, alert and in good condition to the recovery room.    Complications: No    Estimated Blood Loss (EBL): minimal           Specimens: none           Condition: Good    Disposition: PACU - hemodynamically stable.    Attestation: I was present and scrubbed for the entire procedure.

## 2023-07-18 NOTE — ANESTHESIA POSTPROCEDURE EVALUATION
Anesthesia Post Evaluation    Patient: Luisa Padilla    Procedure(s) Performed: Procedure(s) (LRB):  TENOSYNOVECTOMY, THUMB (Left)    Final Anesthesia Type: general      Patient location during evaluation: PACU  Patient participation: Yes- Able to Participate  Level of consciousness: awake and alert  Post-procedure vital signs: reviewed and stable  Pain management: adequate  Airway patency: patent    PONV status at discharge: No PONV  Anesthetic complications: no      Cardiovascular status: blood pressure returned to baseline  Respiratory status: unassisted  Hydration status: euvolemic  Follow-up not needed.          Vitals Value Taken Time   /70 07/18/23 1130   Temp 36.5 °C (97.7 °F) 07/18/23 1130   Pulse 49 07/18/23 1130   Resp 20 07/18/23 1130   SpO2 99 % 07/18/23 1130         Event Time   Out of Recovery 11:35:00         Pain/Layton Score: Pain Rating Prior to Med Admin: 0 (7/18/2023  8:57 AM)  Layton Score: 10 (7/18/2023 11:30 AM)

## 2023-07-18 NOTE — TRANSFER OF CARE
"Anesthesia Transfer of Care Note    Patient: Luisa Padilla    Procedure(s) Performed: Procedure(s) (LRB):  TENOSYNOVECTOMY (Left)    Patient location: PACU    Anesthesia Type: general    Transport from OR: Transported from OR on room air with adequate spontaneous ventilation    Post pain: adequate analgesia    Post assessment: no apparent anesthetic complications    Post vital signs: stable    Level of consciousness: awake and alert    Nausea/Vomiting: no nausea/vomiting    Complications: none    Transfer of care protocol was followed      Last vitals:   Visit Vitals  BP (!) 98/55 (BP Location: Right arm, Patient Position: Lying)   Pulse (!) 53   Temp 36.4 °C (97.5 °F) (Temporal)   Resp 16   Ht 5' 2" (1.575 m)   Wt 63.5 kg (140 lb)   SpO2 96%   Breastfeeding No   BMI 25.61 kg/m²     "

## 2023-07-18 NOTE — DISCHARGE SUMMARY
[facility] ([facility])  Discharge Note  Short Stay    Procedure(s) (LRB):  TENOSYNOVECTOMY (Left)      OUTCOME: Patient tolerated treatment/procedure well without complication and is now ready for discharge.    DISPOSITION: Home or Self Care    FINAL DIAGNOSIS:  left thumb stenosing flexor tenosynovitis    FOLLOWUP: In clinic    DISCHARGE INSTRUCTIONS:    Discharge Procedure Orders   Diet general     Keep surgical extremity elevated     Ice to affected area     Lifting restrictions     No driving, operating heavy equipment or signing legal documents while taking pain medication.     Leave dressing on - Keep it clean, dry, and intact until clinic visit     Call MD for:  temperature >100.4     Call MD for:  persistent nausea and vomiting     Call MD for:  severe uncontrolled pain     Call MD for:  difficulty breathing, headache or visual disturbances     Call MD for:  redness, tenderness, or signs of infection (pain, swelling, redness, odor or green/yellow discharge around incision site)     Call MD for:  hives     Call MD for:  persistent dizziness or light-headedness     Call MD for:  extreme fatigue

## 2023-07-18 NOTE — ANESTHESIA PREPROCEDURE EVALUATION
2023  Luisa Padilla is a 63 y.o., female.      Pre-op Assessment    I have reviewed the Patient Summary Reports.     I have reviewed the Nursing Notes.    I have reviewed the Medications.     Review of Systems  Anesthesia Hx:  No problems with previous Anesthesia  Denies Family Hx of Anesthesia complications.    Cardiovascular:  Cardiovascular Normal Exercise tolerance: good     Pulmonary:  Pulmonary Normal    Neurological:  Neurology Normal      Past Medical History:   Diagnosis Date    BRCA gene mutation negative     BRCA1 negative     BRCA2 negative     Cataract     Epiretinal membrane (ERM) of left eye     Menopause     HOLLAND (obstructive sleep apnea)     mild, no need of CPAP    Seasonal allergies      Past Surgical History:   Procedure Laterality Date     SECTION      x 2    COLONOSCOPY N/A 4/10/2019    Procedure: COLONOSCOPY;  Surgeon: Lauren Lopez MD;  Location: [facility];  Service: Endoscopy;  Laterality: N/A;    EYE SURGERY Left     VITRECTOMY BY PARS PLANA APPROACH Left 2020    Procedure: VITRECTOMY, PARS PLANA APPROACH;  Surgeon: QUINTIN Copeland MD;  Location: [address];  Service: Ophthalmology;  Laterality: Left;  30 min     Patient Active Problem List   Diagnosis    NS (nuclear sclerosis), bilateral    Epiretinal membrane (ERM) of left eye    Prediabetes    Vitamin D deficiency    Mixed hyperlipidemia    Overweight    Primary insomnia     No current facility-administered medications on file as of 2023.     Outpatient Medications as of 2023   Medication Sig Dispense Refill    biotin 1 mg tablet Take 1,000 mcg by mouth 3 (three) times daily.      cholecalciferol, vitamin D3, (VITAMIN D3) 25 mcg (1,000 unit) capsule Take 1 capsule (1,000 Units total) by mouth once daily. 30 capsule 11    fexofenadine (ALLEGRA) 30 MG tablet Take  30 mg by mouth once daily.      ibuprofen (ADVIL,MOTRIN) 800 MG tablet Take 1 tablet (800 mg total) by mouth 3 (three) times daily. 60 tablet 1    traZODone (DESYREL) 50 MG tablet Take 1 tablet (50 mg total) by mouth every evening. 90 tablet 3    vit C/E/zinc ox/lester/lut/zeax (ICAPS AREDS2 ORAL) Take by mouth.           Physical Exam  General: Well nourished, Cooperative, Alert and Oriented    Airway:  Mallampati: II   Mouth Opening: Normal  TM Distance: Normal  Tongue: Normal  Neck ROM: Normal ROM    Chest/Lungs:  Normal Respiratory Rate    Heart:  Rate: Normal      Wt Readings from Last 3 Encounters:   05/24/23 65 kg (143 lb 4.8 oz)   05/05/23 65 kg (143 lb 4.8 oz)   01/11/23 64.9 kg (143 lb)     Temp Readings from Last 3 Encounters:   05/06/22 36.6 °C (97.9 °F)   12/17/21 36.7 °C (98.1 °F) (Oral)   04/01/21 36.7 °C (98.1 °F) (Oral)     BP Readings from Last 3 Encounters:   05/05/23 126/74   01/11/23 117/73   05/06/22 130/82     Pulse Readings from Last 3 Encounters:   05/05/23 65   01/11/23 68   05/06/22 66     Lab Results   Component Value Date    WBC 4.0 05/05/2023    HGB 13.5 05/05/2023    HCT 41.1 05/05/2023    MCV 92.8 05/05/2023     05/05/2023         Chemistry        Component Value Date/Time     05/05/2023 0904    K 4.1 05/05/2023 0904     05/05/2023 0904    CO2 28 05/05/2023 0904    BUN 16 05/05/2023 0904    BUN 17 12/28/2021 0834    CREATININE 0.73 05/05/2023 0904     (H) 05/05/2023 0904        Component Value Date/Time    CALCIUM 9.1 05/05/2023 0904    ALKPHOS 62 12/28/2021 0834    AST 29 05/05/2023 0904    ALT 35 (H) 05/05/2023 0904    BILITOT 0.5 05/05/2023 0904    ESTGFRAFRICA >60 04/01/2021 1042    EGFRNONAA >60 04/01/2021 1042        No results found for this or any previous visit.         Anesthesia Plan  Type of Anesthesia, risks & benefits discussed:    Anesthesia Type: Gen ETT, Gen Supraglottic Airway, Gen Natural Airway, MAC  Intra-op Monitoring Plan: Standard ASA  Monitors  Post Op Pain Control Plan: multimodal analgesia and IV/PO Opioids PRN  Induction:  IV  Informed Consent: Informed consent signed with the Patient and all parties understand the risks and agree with anesthesia plan.  All questions answered.   ASA Score: 2  Day of Surgery Review of History & Physical: H&P Update referred to the surgeon/provider.    Ready For Surgery From Anesthesia Perspective.     .

## 2023-07-19 VITALS
DIASTOLIC BLOOD PRESSURE: 70 MMHG | WEIGHT: 140 LBS | RESPIRATION RATE: 20 BRPM | HEART RATE: 49 BPM | HEIGHT: 62 IN | BODY MASS INDEX: 25.76 KG/M2 | SYSTOLIC BLOOD PRESSURE: 112 MMHG | TEMPERATURE: 98 F | OXYGEN SATURATION: 99 %

## 2023-07-26 ENCOUNTER — CLINICAL SUPPORT (OUTPATIENT)
Dept: REHABILITATION | Facility: HOSPITAL | Age: 63
End: 2023-07-26
Attending: ORTHOPAEDIC SURGERY
Payer: MEDICARE

## 2023-07-26 DIAGNOSIS — M65.319 STENOSING TENOSYNOVITIS OF THUMB: ICD-10-CM

## 2023-07-26 PROCEDURE — 97140 MANUAL THERAPY 1/> REGIONS: CPT

## 2023-07-26 PROCEDURE — 97110 THERAPEUTIC EXERCISES: CPT

## 2023-07-26 PROCEDURE — 97165 OT EVAL LOW COMPLEX 30 MIN: CPT

## 2023-07-26 NOTE — PLAN OF CARE
[facility] Occupational Therapy  Initial Evaluation     Date: 7/26/2023  Patient: Luisa Padilla  Chart Number: [id number]    Therapy Diagnosis:   1. Stenosing tenosynovitis of thumb  Ambulatory referral/consult to Physical/Occupational Therapy        Medical Diagnosis: M65.319 (ICD-10-CM) - Stenosing tenosynovitis of thumb     Referring Physician: Ale Elkins MD  Physician Orders: Eval and treat  Note:   Trigger Finger/Thumb Release (Regular or stenosing)   Pt is to be seen 7-10 days post-op. at [address] for OT Eval.   Sutures to be removed at 7-10 days post op, 10-14 days if DMII, teach Trigger Finger HEP, educate patient on scar massage, and assess for continued therapy.  If patient not able to fully extend trigger finger, recommend continued FU appointments until regained ability to make full fist/extend finger.  Pt. is to see physician at 2-4 weeks post op.  Date of Return to MD: 8/21/23    Date of Injury: February 2023  Date of Surgery: 7/18/23  Procedure: Flexor tenosynovectomy left thumb (A1 pulley release)    Evaluation Date: 7/26/2023  Authorization Period: 7/13/23 - 9/21/23  Plan of Care Expiration: 10/18/23  Visit #/ Visits Authorized: 1 of 1  FOTO Completion: Initial eval (7/26/2023)  FOTO #2:  FOTO #3:    Time In: 4:00 pm  Time Out: 4:55 pm  Total Appointment Time (timed & untimed codes): 55 min    Precautions: Standard, strengthening    Subjective     History of Current Condition: Luisa Padilla is a 63 y.o. year old Right hand dominant female who underwent Left A1 pulley release of the thumb on 7/18/23. She reports symptoms were rather significant prior to surgery and thumb was locked into extension. Luisa Padilla is referred to Occupational Therapy for evaluation and treatment. Patient presents today alone.    Falls: none    Involved Side: Left  Dominant Side: Right    Date of Onset: sx on 7/18/23  Imaging: none  Previous Therapy: none    Pain:  Functional  "Pain Scale Rating 0-10:   Current: 0/10  At Best: 0/10  At Worst: 2/10    Location: Left thumb  Description: throbbing occasional  Aggravating Factors: functional use/at random  Easing Factors: rest    Functional Limitations/Social History:  Prior Level of Function: Independent with all ADLs/IADLs    Current Level of Function:   ADLs: difficulty showering due to post op dressing  IADLs: requires assistance from  for cooking, cleaning, laundry  Leisure: garden, artwork, painting  Driving: Yes    Occupation:    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    Luisa Padilla  has a past surgical history that includes  section; Colonoscopy (N/A, 4/10/2019); Vitrectomy by pars plana approach (Left, 2020); Eye surgery (Left); and tenosynovectomy, forearm or wrist (Left, 2023).    Lusia has a current medication list which includes the following prescription(s): biotin, cholecalciferol (vitamin d3), fexofenadine, glucosamine-chondroitin, ibuprofen, ondansetron, tramadol, trazodone, and vit c/e/zinc ox/lester/lut/zeax.    Review of patient's allergies indicates:  No Known Allergies       Objective     Mental status: alert, oriented x3    Observation/Appearance:   Two sutures in place, incision fully closed in A1 pulley area of thumb - sutures removed and steristrips applied  Mild swelling throughout thumb    Sensation: Patient denies numbness/tingling      Edema:  (Measured circumferential, in centimeters)  Digits: Right  2023 Left  2023        Thumb:     Prox. Phalanx 6.2 6.3   IP 6.0 6.0   Distal Phalanx " 5.6 5.6       Digit AROM:  Measured in degrees of active motion with goniometer and/or distance measured from finger tip to the distal palmar crease (DPC)   Right  7/26/2023 Left  7/26/2023        Index: MP                 PIP                    DIP                QUIROZ WNL WNL        Long:  MP                PIP                DIP                QUIROZ WNL WNL        Ring:   MP                PIP                DIP                QUIROZ WNL WNL        Small:  MP                 PIP                 DIP                QUIROZ WNL WNL        Thumb: MP 0/65 0/25                IP 0/45 0/10    Post tx: thumb IP 30*       Rad ADD/ABD 40 35       Pal ADD/ABD 40 35       Opposition DPC 1.0 cm        STRENGTH: Not tested due to precautions  (Measured in pounds using a Dynamometer and pinch meter)   Right  7/26/2023 Left  7/26/2023    Setting 2      Average     Key     3 Pt     Tip         Intake Outcome Measure for FOTO Initial Evaluation Survey    Therapist reviewed FOTO scores for Luisa Padilla on 7/26/2023.   FOTO documents entered into [facility] - see Media section.    Intake Score: 53%         Treatment     Treatment Time In: 4:25 pm  Treatment Time Out: 4:55 pm  Total Treatment time separate from Evaluation time: 30 minutes    Supervised modalities after being cleared for contraindications: Hot pack applied to the Left hand for 5 minutes for increased blood flow and circulation, increased tissue extensibility, and pain management.    Luisa received the following manual therapy techniques for 10 minutes:   - Gentle PROM of isolated IP and MP flexion, composite flexion  - Gentle thumb IP joint distraction    Luisa performed therapeutic exercises for 15 minutes including:  - AROM thumb IP and MP flex/ext, radial and palmar ab/adduction, opposition, pinky slides, lifts (10 ea) - verbal and visual cueing for technique  - Patient education re: anatomy related to condition, expected healing time frame, precautions of no heavy or  repetitive gripping/pinching at this time  - In hand manipulation with small pom poms (2 sets)  - Velcro roller (20 reps)      Patient Education and Home Exercises     Patient/Family Education Provided:   - Role of OT, goals for OT, scheduling/cancellations - pt verbalized understanding. Discussed insurance limitations with patient.    Written Home Exercises Provided: yes.  Exercises were reviewed and Luisa was able to demonstrate them prior to the end of the session.  Luisa demonstrated good  understanding of the education provided. See EMR under Patient Instructions for exercises provided during therapy sessions.     Pt was advised to perform these exercises free of pain, and to stop performing them if pain occurs.      Assessment     Luisa Padilla is a 63 y.o. female referred to outpatient occupational therapy and presents with a medical diagnosis of Left trigger thumb release.      Assessment of Occupational Performance   Performance Deficits    Physical:  Joint Mobility  Muscle Power/Strength  Muscle Endurance  Skin Integrity/Scar Formation   Strength  Pinch Strength  Pain    Cognitive:  No Deficits    Psychosocial:    Habits  Routines  Rituals     Following medical record review it is determined that pt will benefit from occupational therapy services in order to maximize pain free and/or functional use of left hand. The following goals were discussed with the patient and patient is in agreement with them as to be addressed in the treatment plan. The patient's rehab potential is Good.     Anticipated barriers to occupational therapy: severity of symptoms prior to surgery    Plan of care discussed with patient: Yes  Patient's spiritual, cultural and educational needs considered and patient is agreeable to the plan of care and goals as stated below:     Medical Necessity is demonstrated by the following  Occupational Profile/History  Co-morbidities and personal factors that may impact the plan of care [x]  LOW: Brief chart review  [] MODERATE: Expanded chart review   [] HIGH: Extensive chart review    Moderate / High Support Documentation:      Examination  Performance deficits relating to physical, cognitive or psychosocial skills that result in activity limitations and/or participation restrictions  [] LOW: addressing 1-3 Performance deficits  [x] MODERATE: 3-5 Performance deficits  [] HIGH: 5+ Performance deficits (please support below)    Moderate / High Support Documentation:      Treatment Options [] LOW: Limited options  [x] MODERATE: Several options  [] HIGH: Multiple options      Decision Making/ Complexity Score: low       The following goals were discussed with the patient and patient is in agreement with them as to be addressed in the treatment plan.     Long Term Goals (to be met by discharge):  Date Goal Met:     1.) Luisa Padilla will demonstrate significantly improved functional performance from re-assessment as measured by a FOTO Intake score of more than 63.    Goal Status:   In progress    2.) Luisa Padilla will return to near to prior level of function for ADLs and household management reporting independence or modified independence.    Goal Status:   In progress    3. Luisa Padilla will report pain 2 out of 10 at worst to increase functional use of LUE for work and leisure tasks.    Goal Status:   In progress     Short Term Goals (to be met by 8/23/23):  Date Goal Met:     Luisa Padilla will be independent with home exercise program with written instructions.    Goal Status:   In progress    Luisa Padilla will demonstrate 40 degrees of thumb IP flexion to improve functional performance in ADLs/work/leisure tasks.    Goal Status:   In progress    Luisa Padilla will demonstrate the ability to complete ADL/IADL tasks with 4/10 pain.    Goal Status:   In progress       Plan     Pt to be treated by Occupational Therapy 2 times per week for 12 weeks during the certification  period from 7/26/2023 to 10/16/23 to achieve the established goals.     Treatment to include: Paraffin, Fluidotherapy, Manual therapy/joint mobilizations, Modalities for pain management, US 3 mhz, Therapeutic exercises/activities., Iontophoresis with 2.0 cc Dexamethasone, Strengthening, Edema Control, Scar Management, Wound Care, Electrical Modalities, Joint Protection, and Energy Conservation, as well as any other treatments deemed necessary based on the patient's needs or progress.       Lore Zamorano, OTR/L

## 2023-07-26 NOTE — PATIENT INSTRUCTIONS
"[facility], OCCUPATIONAL THERAPY  HOME EXERCISE PROGRAM     Beginning on 7/28/23: Perform scar massage with thick lotion, 3 min, 3x/day    May use heat prior to exercises or for pain management    Avoid repetitive or forceful gripping or pinching at this time - focus on the scar and range of motion      Complete the following exercises for 10 repetitions, 4x/day:       AROM: Thumb IP Flexion / Extension      Brace thumb below tip joint. Bend joint as far as      possible then straighten.                  AROM: Composite Flexion   Bend both joints of thumb as far as possible.   Try to touch base of little finger.            AROM: Radial Adduction / Abduction  Place your palm flat on the table. Move thumb out   to side. Move back alongside index finger.                                         AROM: Palmar Adduction / Abduction   Rest your small finger on the table. Move thumb   sideways, out and away from   palm. Move back to rest along palm.                              AROM: Opposition   Touch tip of thumb to nail tip of each  finger in turn, making an "O" shape.            AROM: Composite Flesion ("Pinky Slides")  Touch thumb to tip of small finger. Slide thumb down   small finger into palm.           AROM: MP Extension   With palm on table, lift thumb up.   Hold 3 seconds. Relax and lower thumb.      Therapist: JESÚS Bahena/L     Copyright © [facility]. All rights reserved.     "

## 2023-08-02 ENCOUNTER — CLINICAL SUPPORT (OUTPATIENT)
Dept: REHABILITATION | Facility: HOSPITAL | Age: 63
End: 2023-08-02
Payer: MEDICARE

## 2023-08-02 DIAGNOSIS — M79.645 PAIN OF LEFT THUMB: ICD-10-CM

## 2023-08-02 DIAGNOSIS — M25.642 DECREASED RANGE OF MOTION OF LEFT THUMB: ICD-10-CM

## 2023-08-02 PROCEDURE — 97022 WHIRLPOOL THERAPY: CPT

## 2023-08-02 PROCEDURE — 97110 THERAPEUTIC EXERCISES: CPT

## 2023-08-02 PROCEDURE — 97530 THERAPEUTIC ACTIVITIES: CPT

## 2023-08-02 PROCEDURE — 97140 MANUAL THERAPY 1/> REGIONS: CPT

## 2023-08-02 NOTE — PROGRESS NOTES
"  Occupational Therapy Daily Treatment Note     Date: 8/2/2023  Name: Luisa Padilla  Clinic Number: [id number]    Therapy Diagnosis:   1. Pain of left thumb        2. Decreased range of motion of left thumb          Medical Diagnosis: M65.319 (ICD-10-CM) - Stenosing tenosynovitis of thumb      Referring Physician: Ale Elkins MD  Physician Orders: Eval and treat  Note:   Trigger Finger/Thumb Release (Regular or stenosing)   Pt is to be seen 7-10 days post-op. at [address] for OT Eval.   Sutures to be removed at 7-10 days post op, 10-14 days if DMII, teach Trigger Finger HEP, educate patient on scar massage, and assess for continued therapy.  If patient not able to fully extend trigger finger, recommend continued FU appointments until regained ability to make full fist/extend finger.  Pt. is to see physician at 2-4 weeks post op.  Date of Return to MD: 8/21/23     Date of Injury: February 2023  Date of Surgery: 7/18/23  Procedure: Flexor tenosynovectomy left thumb (A1 pulley release)     Evaluation Date: 7/26/2023  Authorization Period: 7/13/23 - 9/21/23  Plan of Care Expiration: 10/18/23  Visit #/ Visits Authorized: 2 of 23  FOTO Completion: Initial eval (7/26/2023)  FOTO #2:  FOTO #3:     Time In: 9:05 am  Time Out: 9:55 am  Total Appointment Time (timed & untimed codes): 50 min     Precautions: Standard, strengthening      Subjective     History of Current Condition: Luisa Padilla is a 63 y.o. year old Right hand dominant female who underwent Left A1 pulley release of the thumb on 7/18/23. She reports symptoms were rather significant prior to surgery and thumb was locked into extension. Luisa Padilla is referred to Occupational Therapy for evaluation and treatment. Patient presents today alone.     Pt reports: "I feel some clicking in my thumb. It doesn't hurt"  Luisa Padilla was compliant with home exercise program given last session.   Response to previous treatment: tolerated " well  Functional change: improved thumb ROM    Pain: 0/10  Location: Left thumb MP    Objective     Mental status: alert, oriented x3     Observation/Appearance:   Two sutures in place, incision fully closed in A1 pulley area of thumb - sutures removed and steristrips applied  Mild swelling throughout thumb     Sensation: Patient denies numbness/tingling        Edema:  (Measured circumferential, in centimeters)  Digits: Right  7/26/2023 Left  7/26/2023           Thumb:       Prox. Phalanx 6.2 6.3   IP 6.0 6.0   Distal Phalanx 5.6 5.6         Digit AROM:  Measured in degrees of active motion with goniometer and/or distance measured from finger tip to the distal palmar crease (DPC)    Right  7/26/2023 Left  7/26/2023 Left  8/2/23            Thumb: MP 0/65 0/25 0/50                IP 0/45 0/10     Post tx: thumb IP 30* 0/30       Rad ADD/ABD 40 35 35       Pal ADD/ABD 40 35 35       Opposition DPC 1.0 cm  SF PIP crease         STRENGTH: Not tested due to precautions  (Measured in pounds using a Dynamometer and pinch meter)    Right  7/26/2023 Left  7/26/2023    Setting 2        Average       Key       3 Pt       Tip             Intake Outcome Measure for FOTO Initial Evaluation Survey     Therapist reviewed FOTO scores for Luisa Pascal Valerie on 7/26/2023.   FOTO documents entered into [email or web address] - see Media section.     Intake Score: 53%              Treatment     Supervised modalities after being cleared for contraindications: Fluidotherapy - 115 degrees and 50 speed, to Left hand for 10 minutes for increased blood flow and circulation, increased tissue extensibility, pain management, and desensitization and sensory re-education.    Luisa received the following manual therapy techniques for 15 minutes:   - STM to volar thumb scar with vibration  - Gentle PROM of isolated IP and MP flexion, composite flexion, extension  - Gentle thumb IP joint distraction     Luisa performed therapeutic exercises for 15 minutes  including:  - Reassessment of ROM  - AROM thumb IP and MP flex/ext, radial and palmar ab/adduction, opposition, pinky slides, lifts (10 ea)  - Velcro roller (20 reps ea direction)    Luisa participated in dynamic functional therapeutic activities to improve functional performance for 10  minutes, including:  - Octyi (2 min)  - In hand manipulation with small pom poms (2 sets)      Billed time spent 1:1    Home Exercises and Education Provided     Education provided:   - Precautions of no heavy or repetitive gripping/pinching at this time  - Progress towards goals     Written Home Exercises Provided: Patient instructed to cont prior HEP.  Exercises were reviewed and Luisa was able to demonstrate them prior to the end of the session.  Luisa demonstrated good  understanding of the HEP provided.   .   See EMR under Patient Instructions for exercises provided prior visit.        Assessment     Luisa tolerated treatment tasks well, with mild complaints of pain in area of scar A1 pulley of thumb during STM and end range flexion. She demonstrates much improved thumb ROM since initial eval but remains limited. She is going away for 2 weeks on vacation and shows good understanding of HEP to include scar management, P/AROM, and refrain from repetitive/forceful  and pinch until follow up with MD. She will return to OT as needed pending improvement over the next 2 weeks while she is away.     Luisa is progressing well towards her goals and there are no updates to goals at this time. Pt prognosis is Good. Pt will continue to benefit from skilled outpatient occupational therapy to address the deficits listed in the problem list on initial evaluation provide pt/family education and to maximize pt's level of independence in the home and community environment.     Pt's spiritual, cultural and educational needs considered and pt agreeable to plan of care and goals.    The following goals were discussed with the patient and patient  is in agreement with them as to be addressed in the treatment plan.      Long Term Goals (to be met by discharge):  Date Goal Met:       1.) Luisa Padilla will demonstrate significantly improved functional performance from re-assessment as measured by a FOTO Intake score of more than 63.     Goal Status:   In progress     2.) Luisa Padilla will return to near to prior level of function for ADLs and household management reporting independence or modified independence.     Goal Status:   In progress     3. Luisa Padilla will report pain 2 out of 10 at worst to increase functional use of LUE for work and leisure tasks.     Goal Status:   In progress      Short Term Goals (to be met by 8/23/23):  Date Goal Met:       Luisa Padilla will be independent with home exercise program with written instructions.    8/2/23 Goal Status:   Met     Luisa Padilla will demonstrate 40 degrees of thumb IP flexion to improve functional performance in ADLs/work/leisure tasks.     Goal Status:   In progress     Luisa Padilla will demonstrate the ability to complete ADL/IADL tasks with 4/10 pain.     Goal Status:   In progress        Plan   Continue skilled occupational therapy with individualized plan of care focusing on increasing functional independence and participation in meaningful daily activities.    Patient reports she is going on vacation for two weeks, and upon return has a follow up with MD. She will continue with HEP in the meanwhile, and return to OT as needed upon return home.    Updates/Grading for next session: progress as tolerated pending MD follow up upon return from vacation      Lore Zamorano, OT

## 2023-08-03 ENCOUNTER — OFFICE VISIT (OUTPATIENT)
Dept: OPHTHALMOLOGY | Facility: CLINIC | Age: 63
End: 2023-08-03
Payer: MEDICARE

## 2023-08-03 DIAGNOSIS — H25.13 NS (NUCLEAR SCLEROSIS), BILATERAL: ICD-10-CM

## 2023-08-03 DIAGNOSIS — H35.372 EPIRETINAL MEMBRANE (ERM) OF LEFT EYE: Primary | ICD-10-CM

## 2023-08-03 PROCEDURE — 92201 PR OPHTHALMOSCOPY, EXT, W/RET DRAW/SCLERAL DEPR, I&R, UNI/BI: ICD-10-PCS | Mod: S$GLB,,, | Performed by: OPHTHALMOLOGY

## 2023-08-03 PROCEDURE — 3288F FALL RISK ASSESSMENT DOCD: CPT | Mod: CPTII,S$GLB,, | Performed by: OPHTHALMOLOGY

## 2023-08-03 PROCEDURE — 1159F MED LIST DOCD IN RCRD: CPT | Mod: CPTII,S$GLB,, | Performed by: OPHTHALMOLOGY

## 2023-08-03 PROCEDURE — 3044F PR MOST RECENT HEMOGLOBIN A1C LEVEL <7.0%: ICD-10-PCS | Mod: CPTII,S$GLB,, | Performed by: OPHTHALMOLOGY

## 2023-08-03 PROCEDURE — 92014 COMPRE OPH EXAM EST PT 1/>: CPT | Mod: S$GLB,,, | Performed by: OPHTHALMOLOGY

## 2023-08-03 PROCEDURE — 1101F PR PT FALLS ASSESS DOC 0-1 FALLS W/OUT INJ PAST YR: ICD-10-PCS | Mod: CPTII,S$GLB,, | Performed by: OPHTHALMOLOGY

## 2023-08-03 PROCEDURE — 92014 PR EYE EXAM, EST PATIENT,COMPREHESV: ICD-10-PCS | Mod: S$GLB,,, | Performed by: OPHTHALMOLOGY

## 2023-08-03 PROCEDURE — 3044F HG A1C LEVEL LT 7.0%: CPT | Mod: CPTII,S$GLB,, | Performed by: OPHTHALMOLOGY

## 2023-08-03 PROCEDURE — 1160F PR REVIEW ALL MEDS BY PRESCRIBER/CLIN PHARMACIST DOCUMENTED: ICD-10-PCS | Mod: CPTII,S$GLB,, | Performed by: OPHTHALMOLOGY

## 2023-08-03 PROCEDURE — 1101F PT FALLS ASSESS-DOCD LE1/YR: CPT | Mod: CPTII,S$GLB,, | Performed by: OPHTHALMOLOGY

## 2023-08-03 PROCEDURE — 1159F PR MEDICATION LIST DOCUMENTED IN MEDICAL RECORD: ICD-10-PCS | Mod: CPTII,S$GLB,, | Performed by: OPHTHALMOLOGY

## 2023-08-03 PROCEDURE — 92201 OPSCPY EXTND RTA DRAW UNI/BI: CPT | Mod: S$GLB,,, | Performed by: OPHTHALMOLOGY

## 2023-08-03 PROCEDURE — 99999 PR PBB SHADOW E&M-EST. PATIENT-LVL III: ICD-10-PCS | Mod: PBBFAC,,, | Performed by: OPHTHALMOLOGY

## 2023-08-03 PROCEDURE — 1160F RVW MEDS BY RX/DR IN RCRD: CPT | Mod: CPTII,S$GLB,, | Performed by: OPHTHALMOLOGY

## 2023-08-03 PROCEDURE — 99999 PR PBB SHADOW E&M-EST. PATIENT-LVL III: CPT | Mod: PBBFAC,,, | Performed by: OPHTHALMOLOGY

## 2023-08-03 PROCEDURE — 3288F PR FALLS RISK ASSESSMENT DOCUMENTED: ICD-10-PCS | Mod: CPTII,S$GLB,, | Performed by: OPHTHALMOLOGY

## 2023-08-03 RX ORDER — MOMETASONE FUROATE 1 MG/G
CREAM TOPICAL
COMMUNITY
Start: 2023-04-27

## 2023-08-03 NOTE — PROGRESS NOTES
HPI    Pt is here today due to concerns of pucker OS. Pt states that she has constant floaters OU. No pain/discomfort. States that she has noticed a decline in VA since last visit. Reports that she saw another doctor for a glasses rx, could not see well through the glasses, went for a recheck and found that she has a wrinkle near macular that she believes may be causing this rapid decline in VA OS.  Eye Meds: Refresh OU PRN (daily)   Last edited by Kadi Damon on 8/3/2023 10:58 AM.          OCT - OD - no ME  OS - post peel - foveal contour improved      A/P    1. ERM OS  With decreased Va and MMP  With functional impairment.    S/p  25g PPV/ILM peel/AFx OS for ERM 12/9/20    Doing well, good IOP    Fovea contour much improved  Va improved from 20/70 to 20/40+ prior to NS      2. NS OU  Some increasing post PPV changes OS  Will sig anisometropia/anisokonia  Ok for CE OS  Dr. Pineda- pt checking insurance coverage      Retina PRN    Letter to Dr. Pineda

## 2023-08-21 ENCOUNTER — OFFICE VISIT (OUTPATIENT)
Dept: ORTHOPEDICS | Facility: CLINIC | Age: 63
End: 2023-08-21
Payer: MEDICARE

## 2023-08-21 DIAGNOSIS — M18.12 PRIMARY OSTEOARTHRITIS OF FIRST CARPOMETACARPAL JOINT OF LEFT HAND: ICD-10-CM

## 2023-08-21 DIAGNOSIS — M65.319 STENOSING TENOSYNOVITIS OF THUMB: Primary | ICD-10-CM

## 2023-08-21 PROCEDURE — 99024 POSTOP FOLLOW-UP VISIT: CPT | Mod: S$GLB,,, | Performed by: ORTHOPAEDIC SURGERY

## 2023-08-21 PROCEDURE — 1101F PR PT FALLS ASSESS DOC 0-1 FALLS W/OUT INJ PAST YR: ICD-10-PCS | Mod: CPTII,S$GLB,, | Performed by: ORTHOPAEDIC SURGERY

## 2023-08-21 PROCEDURE — 1160F RVW MEDS BY RX/DR IN RCRD: CPT | Mod: CPTII,S$GLB,, | Performed by: ORTHOPAEDIC SURGERY

## 2023-08-21 PROCEDURE — 99999 PR PBB SHADOW E&M-EST. PATIENT-LVL III: CPT | Mod: PBBFAC,,, | Performed by: ORTHOPAEDIC SURGERY

## 2023-08-21 PROCEDURE — 3288F PR FALLS RISK ASSESSMENT DOCUMENTED: ICD-10-PCS | Mod: CPTII,S$GLB,, | Performed by: ORTHOPAEDIC SURGERY

## 2023-08-21 PROCEDURE — 1101F PT FALLS ASSESS-DOCD LE1/YR: CPT | Mod: CPTII,S$GLB,, | Performed by: ORTHOPAEDIC SURGERY

## 2023-08-21 PROCEDURE — 99999 PR PBB SHADOW E&M-EST. PATIENT-LVL III: ICD-10-PCS | Mod: PBBFAC,,, | Performed by: ORTHOPAEDIC SURGERY

## 2023-08-21 PROCEDURE — 3044F HG A1C LEVEL LT 7.0%: CPT | Mod: CPTII,S$GLB,, | Performed by: ORTHOPAEDIC SURGERY

## 2023-08-21 PROCEDURE — 3044F PR MOST RECENT HEMOGLOBIN A1C LEVEL <7.0%: ICD-10-PCS | Mod: CPTII,S$GLB,, | Performed by: ORTHOPAEDIC SURGERY

## 2023-08-21 PROCEDURE — 99024 PR POST-OP FOLLOW-UP VISIT: ICD-10-PCS | Mod: S$GLB,,, | Performed by: ORTHOPAEDIC SURGERY

## 2023-08-21 PROCEDURE — 1159F MED LIST DOCD IN RCRD: CPT | Mod: CPTII,S$GLB,, | Performed by: ORTHOPAEDIC SURGERY

## 2023-08-21 PROCEDURE — 3288F FALL RISK ASSESSMENT DOCD: CPT | Mod: CPTII,S$GLB,, | Performed by: ORTHOPAEDIC SURGERY

## 2023-08-21 PROCEDURE — 1159F PR MEDICATION LIST DOCUMENTED IN MEDICAL RECORD: ICD-10-PCS | Mod: CPTII,S$GLB,, | Performed by: ORTHOPAEDIC SURGERY

## 2023-08-21 PROCEDURE — 1160F PR REVIEW ALL MEDS BY PRESCRIBER/CLIN PHARMACIST DOCUMENTED: ICD-10-PCS | Mod: CPTII,S$GLB,, | Performed by: ORTHOPAEDIC SURGERY

## 2023-08-21 NOTE — PROGRESS NOTES
Luisa Padilla presents for post-operative evaluation of   Encounter Diagnoses   Name Primary?    Stenosing tenosynovitis of thumb Yes    Primary osteoarthritis of first carpometacarpal joint of left hand    The patient is now 4 weeks 6 days  s/p left trigger thumb release. She is not having any pain, able to abduct and adduct thumb without any catching or clicking.     She is having cmc basilar thumb clicking, not painful.    PE:    AA&O x 4.  NAD  HEENT:  NCAT, sclera nonicteric  Lungs:  Respirations are equal and unlabored.  CV:  2+ bilateral upper and lower extremity pulses.  MSK: left thumb ROM full.  The incision is well healed.  Full wrist and finger motion.  Neurovascularly intact and has 5/5 thenar and intrinsic musculature strength.        A/P: Status post above, doing well  1) Continue with range of motion, begin strengthening at 6 weeks.  2) F/U 6 weeks   3) Call with any questions/concerns in the interim        Ale Elkins MD    Please be aware that this note has been generated with the assistance of [facility] voice-to-text.  Please excuse any spelling or grammatical errors.

## 2023-10-06 ENCOUNTER — PATIENT MESSAGE (OUTPATIENT)
Dept: PRIMARY CARE CLINIC | Facility: CLINIC | Age: 63
End: 2023-10-06
Payer: MEDICARE

## 2023-10-06 DIAGNOSIS — F51.01 PRIMARY INSOMNIA: ICD-10-CM

## 2023-10-06 RX ORDER — TRAZODONE HYDROCHLORIDE 50 MG/1
50 TABLET ORAL NIGHTLY
Qty: 90 TABLET | Refills: 2 | Status: SHIPPED | OUTPATIENT
Start: 2023-10-06

## 2023-10-06 NOTE — TELEPHONE ENCOUNTER
Refill Decision Note   Luisa Padilla  is requesting a refill authorization.  Brief Assessment and Rationale for Refill:  Approve     Medication Therapy Plan:         Comments:     Note composed:12:03 PM 10/06/2023             Appointments     Last Visit   5/5/2023 Irina Nelson MD   Next Visit   Visit date not found Irina Nelson MD

## 2023-10-06 NOTE — TELEPHONE ENCOUNTER
No care due was identified.  [facility] Embedded Care Due Messages. Reference number: [id number].   10/06/2023 10:28:56 AM CDT

## 2023-10-11 ENCOUNTER — TELEPHONE (OUTPATIENT)
Dept: OPHTHALMOLOGY | Facility: CLINIC | Age: 63
End: 2023-10-11
Payer: MEDICARE

## 2023-10-11 NOTE — TELEPHONE ENCOUNTER
----- Message from Antonio Melvin sent at 10/11/2023  3:47 PM CDT -----  Contact: [phone]  Pt is calling to schedule a Cat Eval per Dr. Copeland. Please call back to further assist.

## 2023-11-08 ENCOUNTER — OFFICE VISIT (OUTPATIENT)
Dept: OPHTHALMOLOGY | Facility: CLINIC | Age: 63
End: 2023-11-08
Attending: OPHTHALMOLOGY
Payer: MEDICARE

## 2023-11-08 DIAGNOSIS — H25.13 NUCLEAR SCLEROSIS, BILATERAL: Primary | ICD-10-CM

## 2023-11-08 DIAGNOSIS — H35.372 EPIRETINAL MEMBRANE (ERM) OF LEFT EYE: ICD-10-CM

## 2023-11-08 PROCEDURE — 1101F PT FALLS ASSESS-DOCD LE1/YR: CPT | Mod: CPTII,S$GLB,, | Performed by: OPHTHALMOLOGY

## 2023-11-08 PROCEDURE — 99999 PR PBB SHADOW E&M-EST. PATIENT-LVL III: CPT | Mod: PBBFAC,,, | Performed by: OPHTHALMOLOGY

## 2023-11-08 PROCEDURE — 99999 PR PBB SHADOW E&M-EST. PATIENT-LVL III: ICD-10-PCS | Mod: PBBFAC,,, | Performed by: OPHTHALMOLOGY

## 2023-11-08 PROCEDURE — 3044F PR MOST RECENT HEMOGLOBIN A1C LEVEL <7.0%: ICD-10-PCS | Mod: CPTII,S$GLB,, | Performed by: OPHTHALMOLOGY

## 2023-11-08 PROCEDURE — 99214 PR OFFICE/OUTPT VISIT, EST, LEVL IV, 30-39 MIN: ICD-10-PCS | Mod: S$GLB,,, | Performed by: OPHTHALMOLOGY

## 2023-11-08 PROCEDURE — 92136 IOL MASTER - OU - BOTH EYES: ICD-10-PCS | Mod: LT,S$GLB,, | Performed by: OPHTHALMOLOGY

## 2023-11-08 PROCEDURE — 1101F PR PT FALLS ASSESS DOC 0-1 FALLS W/OUT INJ PAST YR: ICD-10-PCS | Mod: CPTII,S$GLB,, | Performed by: OPHTHALMOLOGY

## 2023-11-08 PROCEDURE — 1159F PR MEDICATION LIST DOCUMENTED IN MEDICAL RECORD: ICD-10-PCS | Mod: CPTII,S$GLB,, | Performed by: OPHTHALMOLOGY

## 2023-11-08 PROCEDURE — 1160F PR REVIEW ALL MEDS BY PRESCRIBER/CLIN PHARMACIST DOCUMENTED: ICD-10-PCS | Mod: CPTII,S$GLB,, | Performed by: OPHTHALMOLOGY

## 2023-11-08 PROCEDURE — 1160F RVW MEDS BY RX/DR IN RCRD: CPT | Mod: CPTII,S$GLB,, | Performed by: OPHTHALMOLOGY

## 2023-11-08 PROCEDURE — 99214 OFFICE O/P EST MOD 30 MIN: CPT | Mod: S$GLB,,, | Performed by: OPHTHALMOLOGY

## 2023-11-08 PROCEDURE — 3044F HG A1C LEVEL LT 7.0%: CPT | Mod: CPTII,S$GLB,, | Performed by: OPHTHALMOLOGY

## 2023-11-08 PROCEDURE — 3288F FALL RISK ASSESSMENT DOCD: CPT | Mod: CPTII,S$GLB,, | Performed by: OPHTHALMOLOGY

## 2023-11-08 PROCEDURE — 1159F MED LIST DOCD IN RCRD: CPT | Mod: CPTII,S$GLB,, | Performed by: OPHTHALMOLOGY

## 2023-11-08 PROCEDURE — 3288F PR FALLS RISK ASSESSMENT DOCUMENTED: ICD-10-PCS | Mod: CPTII,S$GLB,, | Performed by: OPHTHALMOLOGY

## 2023-11-08 PROCEDURE — 92136 OPHTHALMIC BIOMETRY: CPT | Mod: LT,S$GLB,, | Performed by: OPHTHALMOLOGY

## 2023-11-08 RX ORDER — PREDNISOLONE ACETATE-GATIFLOXACIN-BROMFENAC .75; 5; 1 MG/ML; MG/ML; MG/ML
1 SUSPENSION/ DROPS OPHTHALMIC 3 TIMES DAILY
Qty: 5 ML | Refills: 3 | Status: SHIPPED | OUTPATIENT
Start: 2023-11-08 | End: 2024-03-12 | Stop reason: SDUPTHER

## 2023-11-08 RX ORDER — MOXIFLOXACIN 5 MG/ML
1 SOLUTION/ DROPS OPHTHALMIC
Status: CANCELLED | OUTPATIENT
Start: 2023-11-08

## 2023-11-08 RX ORDER — PHENYLEPHRINE HYDROCHLORIDE 100 MG/ML
1 SOLUTION/ DROPS OPHTHALMIC
Status: CANCELLED | OUTPATIENT
Start: 2023-11-08

## 2023-11-08 RX ORDER — CYCLOP/TROP/PROPA/PHEN/KET/WAT 1-1-0.1%
1 DROPS (EA) OPHTHALMIC (EYE)
Status: CANCELLED | OUTPATIENT
Start: 2023-11-08

## 2023-11-08 NOTE — PROGRESS NOTES
HPI    64 y/o female is here for cataract evaluation.  She does feel like OS>OD   and avoids driving at night due to glare from the lights.   It makes it   hard to  distance at night.    AT's prn OU    POHx:   1. ERM OS w/ decreased Va and MMP        S/p  25g PPV/ILM peel/AFx OS for ERM 12/9/20       2. NS OU     Last edited by Ese Ayala on 11/8/2023  8:49 AM.            Assessment /Plan     For exam results, see Encounter Report.    Nuclear sclerosis, bilateral    Epiretinal membrane (ERM) of left eye      Visually Significant Cataract: Patient reports decreased vision consistent with the clinical amount of lenticular opacity, which reaches the level of visual significance and affects activities of daily living.     Specifically, this patient describes difficulty with:  - driving safely at night  - reading road signs  - reading small print  - deciphering medicine bottles  - reading the newspaper  - using the phone  - reading texts     Risks, benefits, and alternatives to cataract surgery were discussed and the consent reviewed. IOL options were discussed, including ATIOLs and the associated side effects and additional patient cost associated with them.   IOL Selections:   Right eye  IOL: CNA0T0 18.5 (DISTANCE CORRECTION)     Left eye  IOL: CNA0T0 19.0 (HX ERM AND INTERMEDIATE TARGET)    Pt wishes to have LEFT eye done first.  The patient expresses a desire to reduce spectacle dependence. I reviewed various IOL and LASER refractive surgical options and we will attempt to minimize spectacle dependence by managing astigmatism and optimizing IOL selection. Customized Cataract Surgery with Vision Correction (CCVC) was explained to the patient with educational videos and discussion.  The patient voices understanding and wishes to implement this technology during the cataract procedure.  I explained the increased precision of the LASER versus manual techniques, especially as it relates to astigmatism reduction  with arcuate incisions.  I emphasized that although our goal is to reduce the need for refractive correction (glasses or contacts) after surgery, there may still be a need for spectacle correction to achieve optimal visual acuity, and that a reasonable range of functional vision should be the expectation.  No guarantees are made about post operative refraction or visual acuity, as the eye may heal in unpredictable ways, and the standard risks, benefits, and alternatives to cataract surgery were explained.  The patient understands that the refractive portions of this cataract procedure are not covered by insurance, and that there is an out of pocket expense of $1250 (RIGHT EYE ONLY) per eye. I also explained that even though our pre-operative plan is to utilize advanced refractive technologies during surgery, that I may decide to eliminate part or all of this plan if surgical challenges or complications arise, or I feel that it is not in the patient's best interest. Consent forms and an ABN form were given to the patient to review.    [facility] ONLY RIGHT EYE

## 2023-12-04 ENCOUNTER — LAB VISIT (OUTPATIENT)
Dept: LAB | Facility: HOSPITAL | Age: 63
End: 2023-12-04
Attending: STUDENT IN AN ORGANIZED HEALTH CARE EDUCATION/TRAINING PROGRAM
Payer: MEDICARE

## 2023-12-04 ENCOUNTER — OFFICE VISIT (OUTPATIENT)
Dept: PRIMARY CARE CLINIC | Facility: CLINIC | Age: 63
End: 2023-12-04
Payer: MEDICARE

## 2023-12-04 ENCOUNTER — PATIENT MESSAGE (OUTPATIENT)
Dept: OPHTHALMOLOGY | Facility: CLINIC | Age: 63
End: 2023-12-04
Payer: MEDICARE

## 2023-12-04 VITALS
OXYGEN SATURATION: 100 % | HEIGHT: 62 IN | SYSTOLIC BLOOD PRESSURE: 124 MMHG | DIASTOLIC BLOOD PRESSURE: 68 MMHG | BODY MASS INDEX: 25.97 KG/M2 | HEART RATE: 70 BPM | WEIGHT: 141.13 LBS

## 2023-12-04 DIAGNOSIS — Z79.899 OTHER LONG TERM (CURRENT) DRUG THERAPY: ICD-10-CM

## 2023-12-04 DIAGNOSIS — R42 DIZZINESS: Primary | ICD-10-CM

## 2023-12-04 DIAGNOSIS — E83.19 OTHER DISORDERS OF IRON METABOLISM: ICD-10-CM

## 2023-12-04 DIAGNOSIS — R42 DIZZINESS: ICD-10-CM

## 2023-12-04 LAB
25(OH)D3+25(OH)D2 SERPL-MCNC: 45 NG/ML (ref 30–96)
ALBUMIN SERPL BCP-MCNC: 4.1 G/DL (ref 3.5–5.2)
ALP SERPL-CCNC: 67 U/L (ref 55–135)
ALT SERPL W/O P-5'-P-CCNC: 41 U/L (ref 10–44)
ANION GAP SERPL CALC-SCNC: 8 MMOL/L (ref 8–16)
AST SERPL-CCNC: 38 U/L (ref 10–40)
BASOPHILS # BLD AUTO: 0.05 K/UL (ref 0–0.2)
BASOPHILS NFR BLD: 1.1 % (ref 0–1.9)
BILIRUB SERPL-MCNC: 0.6 MG/DL (ref 0.1–1)
BUN SERPL-MCNC: 19 MG/DL (ref 8–23)
CALCIUM SERPL-MCNC: 9.8 MG/DL (ref 8.7–10.5)
CHLORIDE SERPL-SCNC: 106 MMOL/L (ref 95–110)
CO2 SERPL-SCNC: 26 MMOL/L (ref 23–29)
CREAT SERPL-MCNC: 0.8 MG/DL (ref 0.5–1.4)
DIFFERENTIAL METHOD: ABNORMAL
EOSINOPHIL # BLD AUTO: 0.1 K/UL (ref 0–0.5)
EOSINOPHIL NFR BLD: 3 % (ref 0–8)
ERYTHROCYTE [DISTWIDTH] IN BLOOD BY AUTOMATED COUNT: 14.8 % (ref 11.5–14.5)
EST. GFR  (NO RACE VARIABLE): >60 ML/MIN/1.73 M^2
FERRITIN SERPL-MCNC: 216 NG/ML (ref 20–300)
FOLATE SERPL-MCNC: 10.2 NG/ML (ref 4–24)
GLUCOSE SERPL-MCNC: 100 MG/DL (ref 70–110)
HCT VFR BLD AUTO: 42.4 % (ref 37–48.5)
HGB BLD-MCNC: 13.6 G/DL (ref 12–16)
IMM GRANULOCYTES # BLD AUTO: 0.01 K/UL (ref 0–0.04)
IMM GRANULOCYTES NFR BLD AUTO: 0.2 % (ref 0–0.5)
IRON SERPL-MCNC: 88 UG/DL (ref 30–160)
LYMPHOCYTES # BLD AUTO: 1.3 K/UL (ref 1–4.8)
LYMPHOCYTES NFR BLD: 27.2 % (ref 18–48)
MAGNESIUM SERPL-MCNC: 2.3 MG/DL (ref 1.6–2.6)
MCH RBC QN AUTO: 30.3 PG (ref 27–31)
MCHC RBC AUTO-ENTMCNC: 32.1 G/DL (ref 32–36)
MCV RBC AUTO: 94 FL (ref 82–98)
MONOCYTES # BLD AUTO: 0.6 K/UL (ref 0.3–1)
MONOCYTES NFR BLD: 12.5 % (ref 4–15)
NEUTROPHILS # BLD AUTO: 2.6 K/UL (ref 1.8–7.7)
NEUTROPHILS NFR BLD: 56 % (ref 38–73)
NRBC BLD-RTO: 0 /100 WBC
PLATELET # BLD AUTO: 179 K/UL (ref 150–450)
PMV BLD AUTO: 11.2 FL (ref 9.2–12.9)
POTASSIUM SERPL-SCNC: 4.5 MMOL/L (ref 3.5–5.1)
PROT SERPL-MCNC: 7.3 G/DL (ref 6–8.4)
RBC # BLD AUTO: 4.49 M/UL (ref 4–5.4)
SATURATED IRON: 25 % (ref 20–50)
SODIUM SERPL-SCNC: 140 MMOL/L (ref 136–145)
TOTAL IRON BINDING CAPACITY: 351 UG/DL (ref 250–450)
TRANSFERRIN SERPL-MCNC: 237 MG/DL (ref 200–375)
TSH SERPL DL<=0.005 MIU/L-ACNC: 1.09 UIU/ML (ref 0.4–4)
VIT B12 SERPL-MCNC: 659 PG/ML (ref 210–950)
WBC # BLD AUTO: 4.71 K/UL (ref 3.9–12.7)

## 2023-12-04 PROCEDURE — 1101F PT FALLS ASSESS-DOCD LE1/YR: CPT | Mod: CPTII,S$GLB,, | Performed by: STUDENT IN AN ORGANIZED HEALTH CARE EDUCATION/TRAINING PROGRAM

## 2023-12-04 PROCEDURE — 83735 ASSAY OF MAGNESIUM: CPT | Performed by: STUDENT IN AN ORGANIZED HEALTH CARE EDUCATION/TRAINING PROGRAM

## 2023-12-04 PROCEDURE — 1101F PR PT FALLS ASSESS DOC 0-1 FALLS W/OUT INJ PAST YR: ICD-10-PCS | Mod: CPTII,S$GLB,, | Performed by: STUDENT IN AN ORGANIZED HEALTH CARE EDUCATION/TRAINING PROGRAM

## 2023-12-04 PROCEDURE — 3044F PR MOST RECENT HEMOGLOBIN A1C LEVEL <7.0%: ICD-10-PCS | Mod: CPTII,S$GLB,, | Performed by: STUDENT IN AN ORGANIZED HEALTH CARE EDUCATION/TRAINING PROGRAM

## 2023-12-04 PROCEDURE — 84443 ASSAY THYROID STIM HORMONE: CPT | Performed by: STUDENT IN AN ORGANIZED HEALTH CARE EDUCATION/TRAINING PROGRAM

## 2023-12-04 PROCEDURE — 82728 ASSAY OF FERRITIN: CPT | Performed by: STUDENT IN AN ORGANIZED HEALTH CARE EDUCATION/TRAINING PROGRAM

## 2023-12-04 PROCEDURE — 99999 PR PBB SHADOW E&M-EST. PATIENT-LVL III: ICD-10-PCS | Mod: PBBFAC,,, | Performed by: STUDENT IN AN ORGANIZED HEALTH CARE EDUCATION/TRAINING PROGRAM

## 2023-12-04 PROCEDURE — 36415 COLL VENOUS BLD VENIPUNCTURE: CPT | Performed by: STUDENT IN AN ORGANIZED HEALTH CARE EDUCATION/TRAINING PROGRAM

## 2023-12-04 PROCEDURE — 99214 PR OFFICE/OUTPT VISIT, EST, LEVL IV, 30-39 MIN: ICD-10-PCS | Mod: S$GLB,,, | Performed by: STUDENT IN AN ORGANIZED HEALTH CARE EDUCATION/TRAINING PROGRAM

## 2023-12-04 PROCEDURE — 82306 VITAMIN D 25 HYDROXY: CPT | Performed by: STUDENT IN AN ORGANIZED HEALTH CARE EDUCATION/TRAINING PROGRAM

## 2023-12-04 PROCEDURE — 1160F RVW MEDS BY RX/DR IN RCRD: CPT | Mod: CPTII,S$GLB,, | Performed by: STUDENT IN AN ORGANIZED HEALTH CARE EDUCATION/TRAINING PROGRAM

## 2023-12-04 PROCEDURE — 99999 PR PBB SHADOW E&M-EST. PATIENT-LVL III: CPT | Mod: PBBFAC,,, | Performed by: STUDENT IN AN ORGANIZED HEALTH CARE EDUCATION/TRAINING PROGRAM

## 2023-12-04 PROCEDURE — 3008F PR BODY MASS INDEX (BMI) DOCUMENTED: ICD-10-PCS | Mod: CPTII,S$GLB,, | Performed by: STUDENT IN AN ORGANIZED HEALTH CARE EDUCATION/TRAINING PROGRAM

## 2023-12-04 PROCEDURE — 3008F BODY MASS INDEX DOCD: CPT | Mod: CPTII,S$GLB,, | Performed by: STUDENT IN AN ORGANIZED HEALTH CARE EDUCATION/TRAINING PROGRAM

## 2023-12-04 PROCEDURE — 3074F SYST BP LT 130 MM HG: CPT | Mod: CPTII,S$GLB,, | Performed by: STUDENT IN AN ORGANIZED HEALTH CARE EDUCATION/TRAINING PROGRAM

## 2023-12-04 PROCEDURE — 1160F PR REVIEW ALL MEDS BY PRESCRIBER/CLIN PHARMACIST DOCUMENTED: ICD-10-PCS | Mod: CPTII,S$GLB,, | Performed by: STUDENT IN AN ORGANIZED HEALTH CARE EDUCATION/TRAINING PROGRAM

## 2023-12-04 PROCEDURE — 1159F MED LIST DOCD IN RCRD: CPT | Mod: CPTII,S$GLB,, | Performed by: STUDENT IN AN ORGANIZED HEALTH CARE EDUCATION/TRAINING PROGRAM

## 2023-12-04 PROCEDURE — 1159F PR MEDICATION LIST DOCUMENTED IN MEDICAL RECORD: ICD-10-PCS | Mod: CPTII,S$GLB,, | Performed by: STUDENT IN AN ORGANIZED HEALTH CARE EDUCATION/TRAINING PROGRAM

## 2023-12-04 PROCEDURE — 3074F PR MOST RECENT SYSTOLIC BLOOD PRESSURE < 130 MM HG: ICD-10-PCS | Mod: CPTII,S$GLB,, | Performed by: STUDENT IN AN ORGANIZED HEALTH CARE EDUCATION/TRAINING PROGRAM

## 2023-12-04 PROCEDURE — 3044F HG A1C LEVEL LT 7.0%: CPT | Mod: CPTII,S$GLB,, | Performed by: STUDENT IN AN ORGANIZED HEALTH CARE EDUCATION/TRAINING PROGRAM

## 2023-12-04 PROCEDURE — 3288F FALL RISK ASSESSMENT DOCD: CPT | Mod: CPTII,S$GLB,, | Performed by: STUDENT IN AN ORGANIZED HEALTH CARE EDUCATION/TRAINING PROGRAM

## 2023-12-04 PROCEDURE — 99214 OFFICE O/P EST MOD 30 MIN: CPT | Mod: S$GLB,,, | Performed by: STUDENT IN AN ORGANIZED HEALTH CARE EDUCATION/TRAINING PROGRAM

## 2023-12-04 PROCEDURE — 84466 ASSAY OF TRANSFERRIN: CPT | Performed by: STUDENT IN AN ORGANIZED HEALTH CARE EDUCATION/TRAINING PROGRAM

## 2023-12-04 PROCEDURE — 82607 VITAMIN B-12: CPT | Performed by: STUDENT IN AN ORGANIZED HEALTH CARE EDUCATION/TRAINING PROGRAM

## 2023-12-04 PROCEDURE — 3078F PR MOST RECENT DIASTOLIC BLOOD PRESSURE < 80 MM HG: ICD-10-PCS | Mod: CPTII,S$GLB,, | Performed by: STUDENT IN AN ORGANIZED HEALTH CARE EDUCATION/TRAINING PROGRAM

## 2023-12-04 PROCEDURE — 3288F PR FALLS RISK ASSESSMENT DOCUMENTED: ICD-10-PCS | Mod: CPTII,S$GLB,, | Performed by: STUDENT IN AN ORGANIZED HEALTH CARE EDUCATION/TRAINING PROGRAM

## 2023-12-04 PROCEDURE — 85025 COMPLETE CBC W/AUTO DIFF WBC: CPT | Performed by: STUDENT IN AN ORGANIZED HEALTH CARE EDUCATION/TRAINING PROGRAM

## 2023-12-04 PROCEDURE — 82746 ASSAY OF FOLIC ACID SERUM: CPT | Performed by: STUDENT IN AN ORGANIZED HEALTH CARE EDUCATION/TRAINING PROGRAM

## 2023-12-04 PROCEDURE — 83540 ASSAY OF IRON: CPT | Performed by: STUDENT IN AN ORGANIZED HEALTH CARE EDUCATION/TRAINING PROGRAM

## 2023-12-04 PROCEDURE — 80053 COMPREHEN METABOLIC PANEL: CPT | Performed by: STUDENT IN AN ORGANIZED HEALTH CARE EDUCATION/TRAINING PROGRAM

## 2023-12-04 PROCEDURE — 3078F DIAST BP <80 MM HG: CPT | Mod: CPTII,S$GLB,, | Performed by: STUDENT IN AN ORGANIZED HEALTH CARE EDUCATION/TRAINING PROGRAM

## 2023-12-04 NOTE — PROGRESS NOTES
INTERNAL MEDICINE SAME DAY PRIMARY CARE VISIT NOTE    Subjective:     Chief Complaint: Dizziness       Patient ID: Luisa Padilla is a 63 y.o. female, here today for focused same-day primary care visit.    PCP: Dr. Irina Nelson    Today, patient with complaint of dizziness    Sxs started a few months ago. Feels off balance.  No room spinning sensation.  No recent illnesses  Plans for Cataract surgery next month for L eye  Has not been taking allegra recently for allergies.  No n/v. No headaches. No cp or sob. No palpitations.      Past Medical History:  Past Medical History:   Diagnosis Date    BRCA gene mutation negative     BRCA1 negative     BRCA2 negative     Cataract     Epiretinal membrane (ERM) of left eye     Menopause     HOLLAND (obstructive sleep apnea)     mild, no need of CPAP    Seasonal allergies        Home Medications:  Prior to Admission medications    Medication Sig Start Date End Date Taking? Authorizing Provider   biotin 1 mg tablet Take 1,000 mcg by mouth 3 (three) times daily.   Yes Provider, Historical   cholecalciferol, vitamin D3, (VITAMIN D3) 25 mcg (1,000 unit) capsule Take 1 capsule (1,000 Units total) by mouth once daily. 4/2/21  Yes Deyanira Zarco MD   fexofenadine (ALLEGRA) 30 MG tablet Take 30 mg by mouth once daily.   Yes Provider, Historical   glucosamine-chondroitin 500-400 mg tablet Take 1 tablet by mouth 3 (three) times daily.   Yes Provider, Historical   prednisolon/gatiflox/bromfenac (PREDNISOL ACE-GATIFLOX-BROMFEN) 1-0.5-0.075 % DrpS Apply 1 drop to eye 3 (three) times daily. in operative eye for 1 month after surgery 11/8/23  Yes Luis Antonio Ray MD   traZODone (DESYREL) 50 MG tablet Take 1 tablet by mouth in the evening 10/6/23  Yes Irina Nelson MD   vit C/E/zinc ox/lester/lut/zeax (ICAPS AREDS2 ORAL) Take by mouth.   Yes Provider, Historical   ibuprofen (ADVIL,MOTRIN) 800 MG tablet Take 1 tablet (800 mg total) by mouth 3 (three) times daily. 5/13/22   Irina Nelson MD  "  mometasone 0.1% (ELOCON) 0.1 % cream  4/27/23   Provider, Historical   ondansetron (ZOFRAN) 8 MG tablet Take 1 tablet (8 mg total) by mouth every 8 (eight) hours as needed for Nausea. 7/17/23   Ale Elkins MD   traMADoL (ULTRAM) 50 mg tablet Take 1 tablet (50 mg total) by mouth every 6 (six) hours. 7/17/23   Ale Elkins MD       Allergies:  Review of patient's allergies indicates:  No Known Allergies    Social History:  Social History     Tobacco Use    Smoking status: Never    Smokeless tobacco: Never   Substance Use Topics    Alcohol use: Yes     Alcohol/week: 2.0 standard drinks of alcohol     Types: 2 Glasses of wine per week     Comment: a day         Review of Systems   Constitutional:  Negative for diaphoresis, fatigue and fever.   HENT:  Negative for congestion, ear pain and voice change.    Eyes:  Negative for discharge, redness and itching.   Respiratory:  Negative for shortness of breath and wheezing.    Cardiovascular:  Negative for chest pain.   Gastrointestinal:  Negative for abdominal pain.   Musculoskeletal:  Negative for gait problem and joint swelling.   Skin:  Negative for color change, pallor, rash and wound.   Neurological:  Positive for dizziness. Negative for weakness.           Objective:   /68 (BP Location: Left arm, Patient Position: Sitting, BP Method: Medium (Manual))   Pulse 70   Ht 5' 2" (1.575 m)   Wt 64 kg (141 lb 1.5 oz)   SpO2 100%   BMI 25.81 kg/m²        General: AAO x3, no apparent distress  HEENT: PERRL, OP clear  CV: RRR, no m/r/g  Pulm: Lungs CTAB, no crackles, no wheezes  Abd: s/NT/ND +BS  Extremities: no c/c/e    Labs:         Assessment/Plan     Luisa was seen today for dizziness.    Diagnoses and all orders for this visit:    Dizziness  -     Magnesium; Future  -     CBC Auto Differential; Future  -     Comprehensive Metabolic Panel; Future  -     Ferritin; Future  -     Iron and TIBC; Future  -     Folate; Future  -     Vitamin B12; Future  -     " Vitamin D; Future  -     TSH; Future    Other long term (current) drug therapy  -     Magnesium; Future  -     CBC Auto Differential; Future  -     Comprehensive Metabolic Panel; Future  -     Ferritin; Future  -     Iron and TIBC; Future  -     Folate; Future  -     Vitamin B12; Future  -     Vitamin D; Future  -     TSH; Future    Other disorders of iron metabolism  -     Ferritin; Future  -     Iron and TIBC; Future    Follow up lab work  Advise starting daily antihistamine and flonase.  Dizziness possibly related to changes in vision 2/2 cataract, proceed with cataract revision.    RTC prn and with PCP as per routine.    Mela Payan MD  Department of Internal Medicine - [facility]

## 2023-12-12 ENCOUNTER — TELEPHONE (OUTPATIENT)
Dept: OPHTHALMOLOGY | Facility: CLINIC | Age: 63
End: 2023-12-12
Payer: MEDICARE

## 2023-12-12 DIAGNOSIS — H25.12 NUCLEAR SCLEROTIC CATARACT OF LEFT EYE: Primary | ICD-10-CM

## 2024-01-11 ENCOUNTER — TELEPHONE (OUTPATIENT)
Dept: OBSTETRICS AND GYNECOLOGY | Facility: CLINIC | Age: 64
End: 2024-01-11
Payer: MEDICARE

## 2024-01-11 DIAGNOSIS — Z00.00 ENCOUNTER FOR MEDICARE ANNUAL WELLNESS EXAM: ICD-10-CM

## 2024-01-11 NOTE — TELEPHONE ENCOUNTER
Last seen 7/2020.    Pt states she has been having menstrual cramping for about 1.5 weeks.  Started off more at night but has become more frequent.  No vaginal bleeding, itching, irritaiton, discharge, or odor. Denies UTI symptoms such as dysuria, urgency, frequency, cloudy urine or urinary odor.  Recommended an appt.  Scheduled with Dr. Griffith.  Advised may need an US.  US appt on hold after visit with Dr. Griffith.

## 2024-01-16 ENCOUNTER — TELEPHONE (OUTPATIENT)
Dept: OPHTHALMOLOGY | Facility: CLINIC | Age: 64
End: 2024-01-16
Payer: MEDICARE

## 2024-01-16 NOTE — TELEPHONE ENCOUNTER
Patient given arrival time of 7:00 am on Thursday January 18. Nothing to eat or drink after 11:59 pm. Start drops into the operative eye today. [address] [facility]

## 2024-01-17 ENCOUNTER — TELEPHONE (OUTPATIENT)
Dept: OPHTHALMOLOGY | Facility: CLINIC | Age: 64
End: 2024-01-17
Payer: MEDICARE

## 2024-01-17 NOTE — TELEPHONE ENCOUNTER
----- Message from Luis Angel Ramirez sent at 1/17/2024  4:30 PM CST -----  Regarding: appointment access  Contact: self @ [phone]  Pt is schedule for surgery on tomorrow and her insurance will not be covering it . Pt wants to know should she come in for surgeryon tomorrow for 7 am ?.

## 2024-01-17 NOTE — TELEPHONE ENCOUNTER
----- Message from Antonio Melvin sent at 1/17/2024 12:39 PM CST -----  Contact: [phone] Mia [facility] pre-op department is calling and states the pt insurance still hasn't approved the sx yet and state they need more time. She states theres a possibility it may not be approved today.

## 2024-01-18 ENCOUNTER — ANESTHESIA EVENT (OUTPATIENT)
Dept: SURGERY | Facility: HOSPITAL | Age: 64
End: 2024-01-18
Payer: MEDICARE

## 2024-01-18 ENCOUNTER — TELEPHONE (OUTPATIENT)
Dept: OPHTHALMOLOGY | Facility: CLINIC | Age: 64
End: 2024-01-18
Payer: MEDICARE

## 2024-01-18 ENCOUNTER — ANESTHESIA (OUTPATIENT)
Dept: SURGERY | Facility: HOSPITAL | Age: 64
End: 2024-01-18
Payer: MEDICARE

## 2024-01-18 NOTE — DISCHARGE INSTRUCTIONS
CATARACT SURGERY    POST-OPERATIVE INSTRUCTIONS    · Apply drops THREE times a day into operative eye for 30 days.    · DO NOT rub your eye    · Wear protective sunglasses during the day    · Resume moderate activity    · Bathe/shower/wash face normally    · DO NOT apply makeup around the operative eye for 1 week.         You should expect    - Blurry vision and halos for 24-48 hours    - Dilated pupil for 24-48 hours    - Scratchy feeling in the eye for 1-2 days    - Curved shadow in your peripheral vision for 2-3 weeks    - Occasional flickering of lights for up to 1 week    -If you experience severe pain or nausea, please call Dr Ray or the on-call doctor at [phone]    - Plan to see Dr Ray tomorrow .      [facility]    [address] [facility] [address]    ** Most patients can drive the next day, but if you do not feel comfortable driving, please arrange for transportation.

## 2024-01-18 NOTE — H&P
Pre-operative History and Physical  Ophthalmology Service    CC: Cataracts    HPI:   Patient is a 63 y.o. female presents with cataracts requiring surgery as noted in the most recent ophthalmology progress note.    Past Medical History:   Diagnosis Date    BRCA gene mutation negative     BRCA1 negative     BRCA2 negative     Cataract     Epiretinal membrane (ERM) of left eye     Menopause     OHLLAND (obstructive sleep apnea)     mild, no need of CPAP    Seasonal allergies        Past Surgical History:   Procedure Laterality Date     SECTION      x 2    COLONOSCOPY N/A 4/10/2019    Procedure: COLONOSCOPY;  Surgeon: Lauren Lopez MD;  Location: [facility] ENDO;  Service: Endoscopy;  Laterality: N/A;    EYE SURGERY Left     TENOSYNOVECTOMY, FOREARM OR WRIST Left 2023    Procedure: TENOSYNOVECTOMY, THUMB;  Surgeon: Ale Elkins MD;  Location: [facility] OR;  Service: Orthopedics;  Laterality: Left;  THUMB    VITRECTOMY BY PARS PLANA APPROACH Left 2020    Procedure: VITRECTOMY, PARS PLANA APPROACH;  Surgeon: QUINTIN Copeland MD;  Location: [address];  Service: Ophthalmology;  Laterality: Left;  30 min       Family History   Problem Relation Age of Onset    Breast cancer Sister 48    BRCA 1/2 Sister     Breast cancer Paternal Aunt     No Known Problems Mother     Pulmonary fibrosis Father         Cardiac Drug induced    No Known Problems Brother     Thyroid disease Maternal Grandmother     Thyroid nodules Cousin     Colon cancer Neg Hx     Ovarian cancer Neg Hx        Social History     Socioeconomic History    Marital status:    Tobacco Use    Smoking status: Never    Smokeless tobacco: Never   Substance and Sexual Activity    Alcohol use: Yes     Alcohol/week: 2.0 standard drinks of alcohol     Types: 2 Glasses of wine per week     Comment: a day    Sexual activity: Not Currently       No current facility-administered medications for this encounter.     Current Outpatient Medications    Medication Sig Dispense Refill    biotin 1 mg tablet Take 1,000 mcg by mouth 3 (three) times daily.      cholecalciferol, vitamin D3, (VITAMIN D3) 25 mcg (1,000 unit) capsule Take 1 capsule (1,000 Units total) by mouth once daily. 30 capsule 11    fexofenadine (ALLEGRA) 30 MG tablet Take 30 mg by mouth once daily.      glucosamine-chondroitin 500-400 mg tablet Take 1 tablet by mouth 3 (three) times daily.      ibuprofen (ADVIL,MOTRIN) 800 MG tablet Take 1 tablet (800 mg total) by mouth 3 (three) times daily. 60 tablet 1    mometasone 0.1% (ELOCON) 0.1 % cream       ondansetron (ZOFRAN) 8 MG tablet Take 1 tablet (8 mg total) by mouth every 8 (eight) hours as needed for Nausea. 25 tablet 0    prednisolon/gatiflox/bromfenac (PREDNISOL ACE-GATIFLOX-BROMFEN) 1-0.5-0.075 % DrpS Apply 1 drop to eye 3 (three) times daily. in operative eye for 1 month after surgery 5 mL 3    traMADoL (ULTRAM) 50 mg tablet Take 1 tablet (50 mg total) by mouth every 6 (six) hours. 25 tablet 0    traZODone (DESYREL) 50 MG tablet Take 1 tablet by mouth in the evening 90 tablet 2    vit C/E/zinc ox/lester/lut/zeax (ICAPS AREDS2 ORAL) Take by mouth.         Review of patient's allergies indicates:  No Known Allergies      Review of systems:  Gen: denies fevers, chills, nausea, vomitting, weight changes  HEENT: Denies discharge or coughing/sneezing. +blurry vision  Resp: Denies SOB  CV: Denies CP or palpitations  Abd: Denies tenderness, diarrhea, constipation, nausea  Ext:  Denies pain or edema    Physical Exam  BP: Vital signs stable  General: No apparent distress  HEENT: Normocephalic, atraumatic  Lungs: Adequate respirations, no respiratory distress  Heart: Pulses intact  Abdomen: Soft, no distention  Rectal/pelvic: Deferred    Assessment  Patient is a 63 y.o. female with cataracts   - Risks/benefits/alternatives of the procedure including, but not limited to scarring, bleeding, infection, loss or decreased vision, and/or need for possible  repeat surgery discussed with the patient and/or family, and Informed consent obtained prior to surgery and the patient/family voiced good understanding, witnessed, and placed in chart.  - Will proceed with cataract surgery, left eye  - Plan for local/MAC

## 2024-01-18 NOTE — ANESTHESIA PREPROCEDURE EVALUATION
01/18/2024  Luisa Padilla is a 63 y.o., female.  Patient Active Problem List   Diagnosis    NS (nuclear sclerosis), bilateral    Epiretinal membrane (ERM) of left eye    Prediabetes    Vitamin D deficiency    Mixed hyperlipidemia    Overweight    Primary insomnia    Pain of left thumb    Decreased range of motion of left thumb       Anesthesia Evaluation          Review of Systems      Physical Exam   Airway/Jaw/Neck:  Airway Findings: Mouth Opening: Normal     Tongue: Normal      General Airway Assessment: Adult      Mallampati: II  Improves to II with phonation.  TM Distance: Normal, at least 6 cm               Dental:    No active dental problems.    Chest/Lungs:  Chest/Lungs Findings:  Clear to auscultation       Heart/Vascular:  Heart Findings: Rate: Normal  Rhythm: Regular Rhythm  Sounds: Normal                       Mental Status:  Mental Status Findings:  Cooperative, Alert and Oriented         Anesthesia Plan  Type of Anesthesia, risks & benefits discussed:  Anesthesia Type:  MAC    Patient's Preference:   Plan Factors:          Intra-op Monitoring Plan: standard ASA monitors  Intra-op Monitoring Plan Comments:   Post Op Pain Control Plan:   Post Op Pain Control Plan Comments:     Induction:   IV  Beta Blocker:         Informed Consent: Informed consent signed with the Patient and all parties understand the risks and agree with anesthesia plan.  All questions answered.  Anesthesia consent signed with patient.  ASA Score: 2     Day of Surgery Review of History & Physical:              Ready For Surgery From Anesthesia Perspective.             Physical Exam    Airway:  Mallampati: II / II  Mouth Opening: Normal  TM Distance: Normal, at least 6 cm  Tongue: Normal    Chest/Lungs:  Clear to auscultation    Heart:  Rate: Normal  Rhythm: Regular Rhythm  Sounds: Normal        Anesthesia Plan  Type of  Anesthesia, risks & benefits discussed:    Anesthesia Type: MAC  Intra-op Monitoring Plan: standard ASA monitors  Induction:  IV  Informed Consent: Informed consent signed with the Patient and all parties understand the risks and agree with anesthesia plan.  All questions answered.   ASA Score: 2    Ready For Surgery From Anesthesia Perspective.     .

## 2024-01-18 NOTE — TELEPHONE ENCOUNTER
Spoke with patient this morning letting her know we still haven't received a PA for this case and we will have to reschedule her surgery today

## 2024-01-18 NOTE — TELEPHONE ENCOUNTER
----- Message from Antonio Melvin sent at 1/17/2024  4:45 PM CST -----  Contact: [phone]  Pt is calling because she was told her insurance hasn't approved her sx yet. She was trying to reschedule. Please call back to further assist.

## 2024-02-05 ENCOUNTER — OFFICE VISIT (OUTPATIENT)
Dept: OBSTETRICS AND GYNECOLOGY | Facility: CLINIC | Age: 64
End: 2024-02-05
Payer: MEDICARE

## 2024-02-05 VITALS
DIASTOLIC BLOOD PRESSURE: 90 MMHG | HEIGHT: 62 IN | BODY MASS INDEX: 25.72 KG/M2 | SYSTOLIC BLOOD PRESSURE: 130 MMHG | WEIGHT: 139.75 LBS

## 2024-02-05 DIAGNOSIS — R10.2 PELVIC PAIN: Primary | ICD-10-CM

## 2024-02-05 DIAGNOSIS — R10.2 PELVIC CRAMPING: Primary | ICD-10-CM

## 2024-02-05 PROCEDURE — 99999 PR PBB SHADOW E&M-EST. PATIENT-LVL III: CPT | Mod: PBBFAC,,, | Performed by: OBSTETRICS & GYNECOLOGY

## 2024-02-05 PROCEDURE — 99204 OFFICE O/P NEW MOD 45 MIN: CPT | Mod: S$GLB,,, | Performed by: OBSTETRICS & GYNECOLOGY

## 2024-02-05 NOTE — PROGRESS NOTES
Subjective:       Patient ID: Luisa Padilla is a 63 y.o. female.    Chief Complaint:  Pelvic Pain (Pain on left side)      History of Present Illness  - patient presents with complaints of pelvic cramping that started about a month ago. No vaginal bleeding or discharge. Has some constipation.    Past Medical History:   Diagnosis Date    BRCA gene mutation negative     BRCA1 negative     BRCA2 negative     Cataract     Epiretinal membrane (ERM) of left eye     Menopause     HOLLAND (obstructive sleep apnea)     mild, no need of CPAP    Seasonal allergies        Past Surgical History:   Procedure Laterality Date     SECTION      x 2    COLONOSCOPY N/A 4/10/2019    Procedure: COLONOSCOPY;  Surgeon: Lauren Lopez MD;  Location: [facility] ENDO;  Service: Endoscopy;  Laterality: N/A;    EYE SURGERY Left     TENOSYNOVECTOMY, FOREARM OR WRIST Left 2023    Procedure: TENOSYNOVECTOMY, THUMB;  Surgeon: Ale Elkins MD;  Location: [facility] OR;  Service: Orthopedics;  Laterality: Left;  THUMB    VITRECTOMY BY PARS PLANA APPROACH Left 2020    Procedure: VITRECTOMY, PARS PLANA APPROACH;  Surgeon: QUINTIN Copeland MD;  Location: [address];  Service: Ophthalmology;  Laterality: Left;  30 min        Family History   Problem Relation Age of Onset    Breast cancer Sister 48    BRCA 1/2 Sister     Breast cancer Paternal Aunt     No Known Problems Mother     Pulmonary fibrosis Father         Cardiac Drug induced    No Known Problems Brother     Thyroid disease Maternal Grandmother     Thyroid nodules Cousin     Colon cancer Neg Hx     Ovarian cancer Neg Hx         Social History     Socioeconomic History    Marital status:    Tobacco Use    Smoking status: Never    Smokeless tobacco: Never   Substance and Sexual Activity    Alcohol use: Yes     Alcohol/week: 2.0 standard drinks of alcohol     Types: 2 Glasses of wine per week     Comment: a day    Sexual activity: Not Currently           Objective:  "    Vitals:    02/05/24 0856   BP: (!) 130/90   Weight: 63.4 kg (139 lb 12.4 oz)   Height: 5' 2" (1.575 m)       Physical Exam:   Constitutional: She appears well-developed and well-nourished. She is cooperative. No distress.             Abdominal: Soft. There is no abdominal tenderness.     Genitourinary:    Vagina and uterus normal.   There is no rash, tenderness or lesion on the right labia. There is no rash, tenderness or lesion on the left labia. Cervix is normal. Right adnexum displays no mass, no tenderness and no fullness. Left adnexum displays no mass, no tenderness and no fullness.               Neurological: She is alert.          Assessment/ Plan:          Luisa was seen today for pelvic pain.    Diagnoses and all orders for this visit:    Pelvic pain    - benign exam.  - recommend ultrasound (performed today): no acute process to explain cramping. Likely due to bowel issues. Recommend Miralax to promote regular BMs.    Follow up for annual exam.    As of April 1, 2021, the Cures Act has been passed nationally. This new law requires that all doctors progress notes, lab results, pathology reports and radiology reports be released IMMEDIATELY to the patient in the patient portal. That means that the results are released to you at the EXACT same time they are released to me. Therefore, with all of the patients that I have I am not able to reply to each patient exactly when the results come in. So there will be a delay from when you see the results to when I see them and have time to come up with a response to send you. Also I only see these results when I am on the computer at work. So if the results come in over the weekend or after 5 pm of a work day, I will not see them until the next business day. As you can tell, this is a challenge as a physician to give every patient the quick response they hope for and deserve. So please be patient!   Thanks for your understanding and patience.    "

## 2024-02-22 ENCOUNTER — TELEPHONE (OUTPATIENT)
Dept: OPHTHALMOLOGY | Facility: CLINIC | Age: 64
End: 2024-02-22
Payer: MEDICARE

## 2024-02-22 NOTE — TELEPHONE ENCOUNTER
Patient given arrival time of 8:15 am on Monday February 26. Nothing to eat or drink after 11:59 pm.   Start drops into the operative eye today. [address] [facility]

## 2024-02-23 NOTE — PRE-PROCEDURE INSTRUCTIONS
Unable to reach pt via phone.  Left voicemail with arrival time also informing pt of need for responsible  accompaniment and instructing pt to follow pre-procedure instructions provided via [facility] portal.  The following messages was sent to pt's portal.       Dear Luisa     Below you will find basic pre-procedure instructions in preparation for your procedure on 2/26/24 with Dr. Ray     - Nothing to eat or drink after midnight the night before your procedure until after your procedure, except AM meds with small sips of water.     - HOLD all oral Diabetic medications night before and morning of procedure  - HOLD all Insulin morning of procedure  - HOLD all Fluid pills morning of procedure  - HOLD all non-insulin shots until after surgery (Ozempic, Mounjaro, Trulicity, Victoza, Byetta, Wegovy and Adlyxin) (7 days prior)  - HOLD all vitamins, minerals and herbal supplements morning of procedure   - TAKE all B/P meds, EXCEPT those that contain a fluid pill (ex. Lasix, Hydroclorothiazide/HCTZ)  - USE inhalers as needed and bring AM of surgery  - USE eye drops as directed  -TAKE blood thinner meds AM of surgery unless otherwise instructed     - Shower and wash face with dial soap for 3 mins PM prior and AM of surgery  - No powder, lotions, creams, oils, gels, ointments, makeup,  or jewelry    - Wear comfortable clothing (button up shirt)     (Patient is required to have a responsible ride to transport home, ride may not leave while patient is in surgery)     -- [facility], 2nd [facility] Surgery Center, located   @ [address] [place] [address]  2nd Floor Registration        If you have any questions or concerns please feel free to contact your surgeon's office.           Please reply to this message as receipt of delivery.     ARGENTINA Loaiza  [facility]  Pre-Admit - Anesthesia Dept

## 2024-02-26 ENCOUNTER — TELEPHONE (OUTPATIENT)
Dept: OPHTHALMOLOGY | Facility: CLINIC | Age: 64
End: 2024-02-26
Payer: MEDICARE

## 2024-02-26 ENCOUNTER — HOSPITAL ENCOUNTER (OUTPATIENT)
Facility: HOSPITAL | Age: 64
Discharge: HOME | End: 2024-02-26
Attending: OPHTHALMOLOGY | Admitting: OPHTHALMOLOGY
Payer: MEDICARE

## 2024-02-26 VITALS
HEART RATE: 60 BPM | WEIGHT: 136 LBS | DIASTOLIC BLOOD PRESSURE: 58 MMHG | BODY MASS INDEX: 25.03 KG/M2 | RESPIRATION RATE: 20 BRPM | OXYGEN SATURATION: 98 % | HEIGHT: 62 IN | SYSTOLIC BLOOD PRESSURE: 121 MMHG | TEMPERATURE: 98 F

## 2024-02-26 DIAGNOSIS — H25.13 NUCLEAR SCLEROSIS, BILATERAL: ICD-10-CM

## 2024-02-26 DIAGNOSIS — H25.11 NUCLEAR SCLEROTIC CATARACT OF RIGHT EYE: Primary | ICD-10-CM

## 2024-02-26 PROCEDURE — D9220A PRA ANESTHESIA: Mod: ,,, | Performed by: NURSE ANESTHETIST, CERTIFIED REGISTERED

## 2024-02-26 PROCEDURE — 71000015 HC POSTOP RECOV 1ST HR: Performed by: OPHTHALMOLOGY

## 2024-02-26 PROCEDURE — 37000008 HC ANESTHESIA 1ST 15 MINUTES: Performed by: OPHTHALMOLOGY

## 2024-02-26 PROCEDURE — 99900035 HC TECH TIME PER 15 MIN (STAT)

## 2024-02-26 PROCEDURE — 36000707: Performed by: OPHTHALMOLOGY

## 2024-02-26 PROCEDURE — V2632 POST CHMBR INTRAOCULAR LENS: HCPCS | Performed by: OPHTHALMOLOGY

## 2024-02-26 PROCEDURE — 36000706: Performed by: OPHTHALMOLOGY

## 2024-02-26 PROCEDURE — 25000003 PHARM REV CODE 250: Performed by: OPHTHALMOLOGY

## 2024-02-26 PROCEDURE — 66984 XCAPSL CTRC RMVL W/O ECP: CPT | Mod: LT,,, | Performed by: OPHTHALMOLOGY

## 2024-02-26 PROCEDURE — 63600175 PHARM REV CODE 636 W HCPCS: Performed by: NURSE ANESTHETIST, CERTIFIED REGISTERED

## 2024-02-26 PROCEDURE — 94761 N-INVAS EAR/PLS OXIMETRY MLT: CPT

## 2024-02-26 PROCEDURE — 37000009 HC ANESTHESIA EA ADD 15 MINS: Performed by: OPHTHALMOLOGY

## 2024-02-26 DEVICE — LENS CLAREON AUTONOME 19.0D: Type: IMPLANTABLE DEVICE | Site: EYE | Status: FUNCTIONAL

## 2024-02-26 RX ORDER — PROPARACAINE HYDROCHLORIDE 5 MG/ML
SOLUTION/ DROPS OPHTHALMIC
Status: DISCONTINUED | OUTPATIENT
Start: 2024-02-26 | End: 2024-02-26 | Stop reason: HOSPADM

## 2024-02-26 RX ORDER — LIDOCAINE HYDROCHLORIDE 40 MG/ML
INJECTION, SOLUTION RETROBULBAR
Status: DISCONTINUED | OUTPATIENT
Start: 2024-02-26 | End: 2024-02-26 | Stop reason: HOSPADM

## 2024-02-26 RX ORDER — BROMPHENIRAMINE MALEATE, DEXTROMETHORPHAN HBR, PHENYLEPHRINE HCL, DIPHENHYDRAMINE HCL, PHENYLEPHRINE HCL 0.52G
0.52 KIT ORAL DAILY
COMMUNITY

## 2024-02-26 RX ORDER — ACETAMINOPHEN 325 MG/1
650 TABLET ORAL EVERY 4 HOURS PRN
Status: DISCONTINUED | OUTPATIENT
Start: 2024-02-26 | End: 2024-02-26 | Stop reason: HOSPADM

## 2024-02-26 RX ORDER — CYCLOP/TROP/PROPA/PHEN/KET/WAT 1-1-0.1%
1 DROPS (EA) OPHTHALMIC (EYE)
Status: COMPLETED | OUTPATIENT
Start: 2024-02-26 | End: 2024-02-26

## 2024-02-26 RX ORDER — MIDAZOLAM HYDROCHLORIDE 1 MG/ML
INJECTION INTRAMUSCULAR; INTRAVENOUS
Status: DISCONTINUED | OUTPATIENT
Start: 2024-02-26 | End: 2024-02-26

## 2024-02-26 RX ORDER — PROPARACAINE HYDROCHLORIDE 5 MG/ML
1 SOLUTION/ DROPS OPHTHALMIC
Status: DISCONTINUED | OUTPATIENT
Start: 2024-02-26 | End: 2024-02-26 | Stop reason: HOSPADM

## 2024-02-26 RX ORDER — MOXIFLOXACIN 5 MG/ML
SOLUTION/ DROPS OPHTHALMIC
Status: DISCONTINUED | OUTPATIENT
Start: 2024-02-26 | End: 2024-02-26 | Stop reason: HOSPADM

## 2024-02-26 RX ORDER — FENTANYL CITRATE 50 UG/ML
INJECTION, SOLUTION INTRAMUSCULAR; INTRAVENOUS
Status: DISCONTINUED | OUTPATIENT
Start: 2024-02-26 | End: 2024-02-26

## 2024-02-26 RX ORDER — PHENYLEPHRINE HYDROCHLORIDE 100 MG/ML
1 SOLUTION/ DROPS OPHTHALMIC
Status: DISCONTINUED | OUTPATIENT
Start: 2024-02-26 | End: 2024-02-26 | Stop reason: HOSPADM

## 2024-02-26 RX ORDER — MOXIFLOXACIN 5 MG/ML
1 SOLUTION/ DROPS OPHTHALMIC
Status: COMPLETED | OUTPATIENT
Start: 2024-02-26 | End: 2024-02-26

## 2024-02-26 RX ADMIN — Medication 1 DROP: at 08:02

## 2024-02-26 RX ADMIN — MOXIFLOXACIN 1 DROP: 5 SOLUTION/ DROPS OPHTHALMIC at 10:02

## 2024-02-26 RX ADMIN — MIDAZOLAM HYDROCHLORIDE 1 MG: 1 INJECTION INTRAMUSCULAR; INTRAVENOUS at 10:02

## 2024-02-26 RX ADMIN — FENTANYL CITRATE 25 MCG: 50 INJECTION, SOLUTION INTRAMUSCULAR; INTRAVENOUS at 10:02

## 2024-02-26 RX ADMIN — MIDAZOLAM HYDROCHLORIDE 1 MG: 1 INJECTION INTRAMUSCULAR; INTRAVENOUS at 09:02

## 2024-02-26 RX ADMIN — MOXIFLOXACIN OPHTHALMIC 1 DROP: 5 SOLUTION/ DROPS OPHTHALMIC at 08:02

## 2024-02-26 NOTE — TRANSFER OF CARE
"Anesthesia Transfer of Care Note    Patient: Luisa Padilla    Procedure(s) Performed: Procedure(s) (LRB):  EXTRACTION, CATARACT, WITH IOL INSERTION (Left)    Patient location: PACU    Anesthesia Type: MAC    Transport from OR: Transported from OR on room air with adequate spontaneous ventilation    Post pain: adequate analgesia    Post assessment: no apparent anesthetic complications    Post vital signs: stable    Level of consciousness: awake, alert and oriented    Nausea/Vomiting: no nausea/vomiting    Complications: none    Transfer of care protocol was followed      Last vitals: Visit Vitals  BP (!) 151/67 (BP Location: Left arm, Patient Position: Lying)   Pulse 62   Temp 36.6 °C (97.9 °F) (Temporal)   Resp 15   Ht 5' 2" (1.575 m)   Wt 61.7 kg (136 lb)   SpO2 98%   Breastfeeding No   BMI 24.87 kg/m²     "

## 2024-02-26 NOTE — DISCHARGE SUMMARY
Outcome: Successful outpatient ophthalmic surgical procedure  Preprinted Instructions given to patient.  Regular diet.  Activity: No restrictions  Meds: see Med Rec  Condition: stable  Follow up: 1 day with Dr Ray  Disposition: Home  Diagnosis: s/p eye surgery  Date of discharge: 02/26/2024

## 2024-02-26 NOTE — PLAN OF CARE
Chart reviewed. Preop nursing care completed per orders. Safe surgery checklist complete. Pt denies any open wounds cuts or sores. Pt denies any metal in body or use of weight loss injections. Pt AAOX3, VSS on room air. Pt toileted, bed locked in lowest position, call light within reach. Belongings placed under stretcher.

## 2024-02-26 NOTE — OP NOTE
SURGEON:  Luis Antonio Ray M.D.    PREOPERATIVE DIAGNOSIS:    Nuclear Sclerotic Cataract Left Eye    POSTOPERATIVE DIAGNOSIS:    Nuclear Sclerotic Cataract Left Eye    PROCEDURES:    Phacoemulsification with  intraocular lens, Left eye (05715)    DATE OF SURGERY: 02/26/2024    IMPLANT: cna0t0 19.0    ANESTHESIA:  MAC with topical Lidocaine    COMPLICATIONS:  None    ESTIMATED BLOOD LOSS: None    SPECIMENS: None    INDICATIONS:    The patient has a history of painless progressive visual loss and difficulty with activities of daily living, which specifically include difficult driving at night due to glare and difficulty reading small print, secondary to cataract formation.  After a thorough discussion of the risks, benefits, and alternatives to cataract surgery, including, but not limited to, the rare risks of infection, retinal detachment, hemorrhage, need for additional surgery, loss of vision, and even loss of the eye, the patient voices understanding and desires to proceed.    DESCRIPTION OF PROCEDURE:    The patients IOL calculations were reviewed, and the lens selection confirmed.   After verification and marking of the proper eye in the preop holding area, the patient was brought to the operating room in supine position where the eye was prepped and draped in standard sterile fashion with 5% Betadine and a lid speculum placed in the eye.   Topical 4% Lidocaine was used in addition to the preoperative anesthesia and the procedure was begun by the creation of a paracentesis incision through which viscoelastic was used to fill the anterior chamber.  Next, a keratome blade was used to create a triplanar temporal clear corneal incision and a cystotome and Utrata forceps used to fashion a continuous curvilinear capsulorrhexis.  Hydrodissection was carried out using the Anderson hydrodissection cannula and the nucleus was found to be mobile.  Phacoemulsification of the nucleus was carried out using a quick chop technique,  and all remaining epinuclear and cortical material was removed.  The eye was then reformed with Viscoelastic and the  intraocular lens was implanted into the capsular bag.  All remaining viscoelastics were removed from the eye and at the end of the case the pupil was round, the lens was well-centered within the capsular bag and all wounds were found to be water tight.  Drops of Vigamox and Pred Forte were instilled and a shield was placed over the eye. The patient will follow up with Dr. Ray in the morning.

## 2024-02-26 NOTE — ANESTHESIA POSTPROCEDURE EVALUATION
Anesthesia Post Evaluation    Patient: Luisa Padilla    Procedure(s) Performed: Procedure(s) (LRB):  EXTRACTION, CATARACT, WITH IOL INSERTION (Left)    Final Anesthesia Type: MAC      Patient location during evaluation: [facility]  Patient participation: Yes- Able to Participate  Level of consciousness: awake and alert  Post-procedure vital signs: reviewed and stable  Pain management: adequate  Airway patency: patent    PONV status at discharge: No PONV  Anesthetic complications: no      Cardiovascular status: blood pressure returned to baseline and hemodynamically stable  Respiratory status: unassisted  Hydration status: euvolemic  Follow-up not needed.              Vitals Value Taken Time   /58 02/26/24 1031   Temp 36.5 °C (97.7 °F) 02/26/24 1020   Pulse 58 02/26/24 1035   Resp 20 02/26/24 1030   SpO2 99 % 02/26/24 1035   Vitals shown include unvalidated device data.      No case tracking events are documented in the log.      Pain/Layton Score: Layton Score: 10 (2/26/2024 10:20 AM)

## 2024-02-27 ENCOUNTER — OFFICE VISIT (OUTPATIENT)
Dept: OPHTHALMOLOGY | Facility: CLINIC | Age: 64
End: 2024-02-27
Payer: MEDICARE

## 2024-02-27 DIAGNOSIS — Z98.890 POST-OPERATIVE STATE: Primary | ICD-10-CM

## 2024-02-27 DIAGNOSIS — H25.12 NUCLEAR SCLEROTIC CATARACT OF LEFT EYE: ICD-10-CM

## 2024-02-27 PROCEDURE — 99024 POSTOP FOLLOW-UP VISIT: CPT | Mod: S$GLB,,, | Performed by: OPHTHALMOLOGY

## 2024-02-27 PROCEDURE — 99999 PR PBB SHADOW E&M-EST. PATIENT-LVL III: CPT | Mod: PBBFAC,,, | Performed by: OPHTHALMOLOGY

## 2024-02-27 NOTE — PROGRESS NOTES
HPI    DATE OF SURGERY: 02/26/2024   IMPLANT: cna0t0 19.0    Patient present today for 1 day Post Op   Pt state no complaints at this time   Denies f/f    PGB TID   Last edited by Jessa Reece on 2/27/2024  9:25 AM.            Assessment /Plan     For exam results, see Encounter Report.    Post-operative state    Nuclear sclerotic cataract of left eye      Slit lamp exam:  L/L: nl  K: clear, wound sealed  AC: 1+ cell  Lens: IOL centered and stable    POD1 s/p Phaco/IOL  Appropriate precautions and post op medications reviewed.  Patient instructed to call or come in if symptoms of redness, decreased vision, or pain are experienced.

## 2024-03-08 ENCOUNTER — OFFICE VISIT (OUTPATIENT)
Dept: OPHTHALMOLOGY | Facility: CLINIC | Age: 64
End: 2024-03-08
Payer: MEDICARE

## 2024-03-08 DIAGNOSIS — Z98.890 POST-OPERATIVE STATE: ICD-10-CM

## 2024-03-08 DIAGNOSIS — H25.13 NUCLEAR SCLEROSIS, BILATERAL: Primary | ICD-10-CM

## 2024-03-08 PROCEDURE — 99999 PR PBB SHADOW E&M-EST. PATIENT-LVL I: CPT | Mod: PBBFAC,,, | Performed by: OPHTHALMOLOGY

## 2024-03-08 PROCEDURE — 99024 POSTOP FOLLOW-UP VISIT: CPT | Mod: S$GLB,,, | Performed by: OPHTHALMOLOGY

## 2024-03-08 RX ORDER — CYCLOP/TROP/PROPA/PHEN/KET/WAT 1-1-0.1%
1 DROPS (EA) OPHTHALMIC (EYE)
Status: CANCELLED | OUTPATIENT
Start: 2024-03-08

## 2024-03-08 RX ORDER — SODIUM CHLORIDE 0.9 % (FLUSH) 0.9 %
10 SYRINGE (ML) INJECTION
Status: SHIPPED | OUTPATIENT
Start: 2024-03-08

## 2024-03-08 RX ORDER — PHENYLEPHRINE HYDROCHLORIDE 100 MG/ML
1 SOLUTION/ DROPS OPHTHALMIC
Status: CANCELLED | OUTPATIENT
Start: 2024-03-08

## 2024-03-08 RX ORDER — MOXIFLOXACIN 5 MG/ML
1 SOLUTION/ DROPS OPHTHALMIC
Status: CANCELLED | OUTPATIENT
Start: 2024-03-08

## 2024-03-08 NOTE — PROGRESS NOTES
HPI    POW1 Phaco OS  Occ sharp pain    Last edited by Luis Antonio Ray MD on 3/8/2024  9:41 AM.            Assessment /Plan     For exam results, see Encounter Report.    Nuclear sclerosis, bilateral    Post-operative state      Slit lamp exam:  L/L: nl  K: clear, wound sealed  AC: trace cell  Iris/Lens: IOL centered and stable    POW1 s/p phaco: Surgery healing well with no signs of infection or abnormal inflammation.    Patient wishes to proceed with surgery in the second eye. Risks, benefits, alternatives reviewed. IOL selection reviewed.     Right eye  IOL: CNA0T0 18.5      Left eye  IOL: CNA0T0 19.0 (HX ERM AND INTERMEDIATE TARGET), good intermediate result      The patient expresses a desire to reduce spectacle dependence. I reviewed various IOL and LASER refractive surgical options and we will attempt to minimize spectacle dependence by managing astigmatism and optimizing IOL selection. Customized Cataract Surgery with Vision Correction (CCVC) was explained to the patient with educational videos and discussion.  The patient voices understanding and wishes to implement this technology during the cataract procedure.  I explained the increased precision of the LASER versus manual techniques, especially as it relates to astigmatism reduction with arcuate incisions.  I emphasized that although our goal is to reduce the need for refractive correction (glasses or contacts) after surgery, there may still be a need for spectacle correction to achieve optimal visual acuity, and that a reasonable range of functional vision should be the expectation.  No guarantees are made about post operative refraction or visual acuity, as the eye may heal in unpredictable ways, and the standard risks, benefits, and alternatives to cataract surgery were explained.  The patient understands that the refractive portions of this cataract procedure are not covered by insurance, and that there is an out of pocket expense of $1250 (RIGHT EYE  ONLY) per eye. I also explained that even though our pre-operative plan is to utilize advanced refractive technologies during surgery, that I may decide to eliminate part or all of this plan if surgical challenges or complications arise, or I feel that it is not in the patient's best interest. Consent forms and an ABN form were given to the patient to review.    ORA ONLY RIGHT EYE

## 2024-03-12 DIAGNOSIS — H25.12 NUCLEAR SCLEROTIC CATARACT OF LEFT EYE: Primary | ICD-10-CM

## 2024-03-12 RX ORDER — PREDNISOLONE ACETATE-GATIFLOXACIN-BROMFENAC .75; 5; 1 MG/ML; MG/ML; MG/ML
1 SUSPENSION/ DROPS OPHTHALMIC 3 TIMES DAILY
Qty: 5 ML | Refills: 3 | Status: SHIPPED | OUTPATIENT
Start: 2024-03-12

## 2024-03-14 ENCOUNTER — TELEPHONE (OUTPATIENT)
Dept: OPHTHALMOLOGY | Facility: CLINIC | Age: 64
End: 2024-03-14
Payer: MEDICARE

## 2024-03-14 NOTE — TELEPHONE ENCOUNTER
Patient given arrival time of 7:00 am on Monday March 18 . Nothing to eat or drink after 11:59 pm. Start drops into the operative eye today. [address] [facility]

## 2024-03-15 NOTE — PRE-PROCEDURE INSTRUCTIONS
Patient reviewed on 3/15/2024.  Okay to proceed at [facility]. The following pre-procedure instructions and arrival time have been reviewed with patient via phone and sent to patient portal for review.  Patient verbalized an understanding.  Pt to be accompanied by her  day of procedure as responsible .      Dear Luisa     Below you will find basic pre-procedure instructions in preparation for your procedure on 3/18/24 with Dr. Ray     You should have received your arrival time already from Dr's office.     - Nothing to eat or drink after midnight the night before your procedure until after your procedure, except AM meds with small sips of water.     - HOLD all oral Diabetic medications night before and morning of procedure  - HOLD all Insulin morning of procedure  - HOLD all Fluid pills morning of procedure  - HOLD all non-insulin shots until after surgery (Ozempic, Mounjaro, Trulicity, Victoza, Byetta, Wegovy and Adlyxin) (7 days prior)  - HOLD all vitamins, minerals and herbal supplements morning of procedure   - TAKE all B/P meds, EXCEPT those that contain a fluid pill (ex. Lasix, Hydroclorothiazide/HCTZ)  - USE inhalers as needed and bring AM of surgery  - USE eye drops as directed  -TAKE blood thinner meds AM of surgery unless otherwise instructed by your provider to not take     - Shower and wash face with dial soap for 3 mins PM prior and AM of surgery  - No powder, lotions, creams, oils, gels, ointments, makeup,  or jewelry    - Wear comfortable clothing (button up shirt)     (Patient is required to have a responsible ride to transport home, ride may not leave while patient is in surgery)     -- [facility], 2nd floor Surgery Center, located   @ [address] [place] [address] Floor Registration        If you have any questions or concerns please feel free to contact your surgeon's office.           Please reply to this message as receipt of delivery.      Catina, LPN [facility]  Pre-Admit - Anesthesia Dept

## 2024-03-18 ENCOUNTER — HOSPITAL ENCOUNTER (OUTPATIENT)
Facility: HOSPITAL | Age: 64
Discharge: HOME | End: 2024-03-18
Attending: OPHTHALMOLOGY | Admitting: OPHTHALMOLOGY
Payer: MEDICARE

## 2024-03-18 VITALS
BODY MASS INDEX: 25.03 KG/M2 | HEIGHT: 62 IN | DIASTOLIC BLOOD PRESSURE: 65 MMHG | HEART RATE: 67 BPM | OXYGEN SATURATION: 98 % | WEIGHT: 136 LBS | TEMPERATURE: 98 F | RESPIRATION RATE: 16 BRPM | SYSTOLIC BLOOD PRESSURE: 123 MMHG

## 2024-03-18 DIAGNOSIS — H25.13 NUCLEAR SCLEROSIS, BILATERAL: ICD-10-CM

## 2024-03-18 DIAGNOSIS — H25.13 NS (NUCLEAR SCLEROSIS), BILATERAL: Primary | ICD-10-CM

## 2024-03-18 PROCEDURE — 36000706: Performed by: OPHTHALMOLOGY

## 2024-03-18 PROCEDURE — 63600175 PHARM REV CODE 636 W HCPCS: Performed by: OPHTHALMOLOGY

## 2024-03-18 PROCEDURE — 94761 N-INVAS EAR/PLS OXIMETRY MLT: CPT | Mod: 59

## 2024-03-18 PROCEDURE — 99900035 HC TECH TIME PER 15 MIN (STAT)

## 2024-03-18 PROCEDURE — 66999 UNLISTED PX ANT SEGMENT EYE: CPT | Mod: CSM,RT,, | Performed by: OPHTHALMOLOGY

## 2024-03-18 PROCEDURE — 36000707: Performed by: OPHTHALMOLOGY

## 2024-03-18 PROCEDURE — 66984 XCAPSL CTRC RMVL W/O ECP: CPT | Mod: 79,RT,, | Performed by: OPHTHALMOLOGY

## 2024-03-18 PROCEDURE — 99152 MOD SED SAME PHYS/QHP 5/>YRS: CPT | Performed by: OPHTHALMOLOGY

## 2024-03-18 PROCEDURE — V2632 POST CHMBR INTRAOCULAR LENS: HCPCS | Performed by: OPHTHALMOLOGY

## 2024-03-18 PROCEDURE — 25000003 PHARM REV CODE 250: Performed by: OPHTHALMOLOGY

## 2024-03-18 PROCEDURE — 71000015 HC POSTOP RECOV 1ST HR: Performed by: OPHTHALMOLOGY

## 2024-03-18 DEVICE — LENS CLAREON AUTONOME 18.5D: Type: IMPLANTABLE DEVICE | Site: EYE | Status: FUNCTIONAL

## 2024-03-18 RX ORDER — FENTANYL CITRATE 50 UG/ML
25 INJECTION, SOLUTION INTRAMUSCULAR; INTRAVENOUS
Status: DISCONTINUED | OUTPATIENT
Start: 2024-03-18 | End: 2024-03-18 | Stop reason: HOSPADM

## 2024-03-18 RX ORDER — PROPARACAINE HYDROCHLORIDE 5 MG/ML
SOLUTION/ DROPS OPHTHALMIC
Status: DISCONTINUED | OUTPATIENT
Start: 2024-03-18 | End: 2024-03-18 | Stop reason: HOSPADM

## 2024-03-18 RX ORDER — MOXIFLOXACIN 5 MG/ML
1 SOLUTION/ DROPS OPHTHALMIC
Status: DISCONTINUED | OUTPATIENT
Start: 2024-03-18 | End: 2024-03-18 | Stop reason: HOSPADM

## 2024-03-18 RX ORDER — ACETAMINOPHEN 325 MG/1
650 TABLET ORAL EVERY 4 HOURS PRN
Status: DISCONTINUED | OUTPATIENT
Start: 2024-03-18 | End: 2024-03-18 | Stop reason: HOSPADM

## 2024-03-18 RX ORDER — LIDOCAINE HYDROCHLORIDE 40 MG/ML
INJECTION, SOLUTION RETROBULBAR
Status: DISCONTINUED | OUTPATIENT
Start: 2024-03-18 | End: 2024-03-18 | Stop reason: HOSPADM

## 2024-03-18 RX ORDER — MOXIFLOXACIN 5 MG/ML
SOLUTION/ DROPS OPHTHALMIC
Status: DISCONTINUED | OUTPATIENT
Start: 2024-03-18 | End: 2024-03-18 | Stop reason: HOSPADM

## 2024-03-18 RX ORDER — CYCLOP/TROP/PROPA/PHEN/KET/WAT 1-1-0.1%
1 DROPS (EA) OPHTHALMIC (EYE)
Status: COMPLETED | OUTPATIENT
Start: 2024-03-18 | End: 2024-03-18

## 2024-03-18 RX ORDER — PROPARACAINE HYDROCHLORIDE 5 MG/ML
1 SOLUTION/ DROPS OPHTHALMIC
Status: DISCONTINUED | OUTPATIENT
Start: 2024-03-18 | End: 2024-03-18 | Stop reason: HOSPADM

## 2024-03-18 RX ORDER — MIDAZOLAM HYDROCHLORIDE 1 MG/ML
1 INJECTION INTRAMUSCULAR; INTRAVENOUS
Status: DISCONTINUED | OUTPATIENT
Start: 2024-03-18 | End: 2024-03-18 | Stop reason: HOSPADM

## 2024-03-18 RX ORDER — PHENYLEPHRINE HYDROCHLORIDE 100 MG/ML
1 SOLUTION/ DROPS OPHTHALMIC
Status: DISCONTINUED | OUTPATIENT
Start: 2024-03-18 | End: 2024-03-18 | Stop reason: HOSPADM

## 2024-03-18 RX ORDER — MOXIFLOXACIN 5 MG/ML
1 SOLUTION/ DROPS OPHTHALMIC
Status: COMPLETED | OUTPATIENT
Start: 2024-03-18 | End: 2024-03-18

## 2024-03-18 RX ADMIN — MOXIFLOXACIN OPHTHALMIC 1 DROP: 5 SOLUTION/ DROPS OPHTHALMIC at 07:03

## 2024-03-18 RX ADMIN — MOXIFLOXACIN 1 DROP: 5 SOLUTION/ DROPS OPHTHALMIC at 09:03

## 2024-03-18 RX ADMIN — MIDAZOLAM 2 MG: 1 INJECTION INTRAMUSCULAR; INTRAVENOUS at 08:03

## 2024-03-18 RX ADMIN — MOXIFLOXACIN OPHTHALMIC 1 DROP: 5 SOLUTION/ DROPS OPHTHALMIC at 08:03

## 2024-03-18 RX ADMIN — MIDAZOLAM 1 MG: 1 INJECTION INTRAMUSCULAR; INTRAVENOUS at 09:03

## 2024-03-18 RX ADMIN — Medication 1 DROP: at 07:03

## 2024-03-18 RX ADMIN — Medication 1 DROP: at 08:03

## 2024-03-18 NOTE — DISCHARGE SUMMARY
Outcome: Successful outpatient ophthalmic surgical procedure  Preprinted Instructions given to patient.  Regular diet.  Activity: No restrictions  Meds: see Med Rec  Condition: stable  Follow up: 1 day with Dr Ray  Disposition: Home  Diagnosis: s/p eye surgery  Date of discharge: 03/18/2024

## 2024-03-18 NOTE — OP NOTE
SURGEON:  Luis Antonio Ray M.D.    PREOPERATIVE DIAGNOSIS:    Nuclear Sclerotic Cataract Right Eye    POSTOPERATIVE DIAGNOSIS:    Nuclear Sclerotic Cataract Right Eye    PROCEDURES:    Phacoemulsification with  intraocular lens, Right eye (19929)    DATE OF SURGERY: 03/18/2024    IMPLANT: CNA0T0 18.5    ANESTHESIA:  moderate sedation with topical Lidocaine. Patient ID confirmed and re-evaluated the patient and anesthesia plan confirming it is suitable for the patient's condition and procedure.    COMPLICATIONS:  None    ESTIMATED BLOOD LOSS: None    SPECIMENS: None    INDICATIONS:    The patient has a history of painless progressive visual loss and difficulty with activities of daily living, which specifically include difficult driving at night due to glare and difficulty reading small print, secondary to cataract formation.  After a thorough discussion of the risks, benefits, and alternatives to cataract surgery, including, but not limited to, the rare risks of infection, retinal detachment, hemorrhage, need for additional surgery, loss of vision, and even loss of the eye, the patient voices understanding and desires to proceed.    DESCRIPTION OF PROCEDURE:    The patients IOL calculations were reviewed, and the lens selection confirmed.   After verification and marking of the proper eye in the preop holding area, the patient was brought to the operating room in supine position where the eye was prepped and draped in standard sterile fashion with 5% Betadine and a lid speculum placed in the eye.   Topical 4% Lidocaine was used in addition to the preoperative anesthesia and the procedure was begun by the creation of a paracentesis incision through which viscoelastic was used to fill the anterior chamber.  Next, a keratome blade was used to create a triplanar temporal clear corneal incision and a cystotome and Utrata forceps used to fashion a continuous curvilinear capsulorrhexis.  Hydrodissection was carried out using  the Anderson hydrodissection cannula and the nucleus was found to be mobile.  Phacoemulsification of the nucleus was carried out using a quick chop technique, and all remaining epinuclear and cortical material was removed.  The eye was then reformed with Viscoelastic and the  intraocular lens was implanted into the capsular bag.  All remaining viscoelastics were removed from the eye and at the end of the case the pupil was round, the lens was well-centered within the capsular bag and all wounds were found to be water tight.  Drops of Vigamox and Pred Forte were instilled and a shield was placed over the eye. The patient will follow up with Dr. Ray in the morning.

## 2024-03-18 NOTE — INTERVAL H&P NOTE
CC: Painless, progressive loss of vision  Present Illness: Nuclear sclerotic cataract  Allergies/Current Meds: see meds  Mental Status: A&O x3  Pertinent Medical History: n/a     Physical Exam  General: NAD  HEENT: Eye white/quiet  Lungs: Adequate respirations  Heart: + pulses  Abdomen: soft  Rectal/GI/: deferred     Impression: Cataract  Plan: Phacoemulsification with lens implant    ASA Score: II    Mallampati Score: II    Plan for Sedation: Moderate Sedation    Patient or Family History of Anesthesia problems : No    Any changes affecting the anesthesia assessment which may have changed since the initial assessment and H and P: No

## 2024-03-18 NOTE — DISCHARGE INSTRUCTIONS
CATARACT SURGERY    POST-OPERATIVE INSTRUCTIONS    · Apply drops THREE times a day into operative eye for 30 days.    · DO NOT rub your eye    · Wear protective sunglasses during the day    · Resume moderate activity    · Bathe/shower/wash face normally    · DO NOT apply makeup around the operative eye for 1 week.         You should expect    - Blurry vision and halos for 24-48 hours    - Dilated pupil for 24-48 hours    - Scratchy feeling in the eye for 1-2 days    - Curved shadow in your peripheral vision for 2-3 weeks    - Occasional flickering of lights for up to 1 week    -If you experience severe pain or nausea, please call Dr Ray or the on-call doctor at [phone]    - Plan to see Dr Ray tomorrow .      [facility]    [address] [facility] [address]    ** Most patients can drive the next day, but if you do not feel comfortable driving, please arrange for transportation.

## 2024-03-19 ENCOUNTER — OFFICE VISIT (OUTPATIENT)
Dept: OPHTHALMOLOGY | Facility: CLINIC | Age: 64
End: 2024-03-19
Payer: MEDICARE

## 2024-03-19 DIAGNOSIS — Z98.890 POST-OPERATIVE STATE: Primary | ICD-10-CM

## 2024-03-19 DIAGNOSIS — H25.11 NUCLEAR SCLEROTIC CATARACT OF RIGHT EYE: ICD-10-CM

## 2024-03-19 PROCEDURE — 99999 PR PBB SHADOW E&M-EST. PATIENT-LVL III: CPT | Mod: PBBFAC,,, | Performed by: OPHTHALMOLOGY

## 2024-03-19 PROCEDURE — 99024 POSTOP FOLLOW-UP VISIT: CPT | Mod: S$GLB,,, | Performed by: OPHTHALMOLOGY

## 2024-03-19 NOTE — PROGRESS NOTES
Assessment /Plan     For exam results, see Encounter Report.    There are no diagnoses linked to this encounter.

## 2024-03-19 NOTE — PROGRESS NOTES
HPI    Patient present today for 1 day Post Op OD  No complaints at this time     DATE OF SURGERY: 03/18/2024  IMPLANT: CNA0T0 18.5    PGB  TID OD  Last edited by Jessa Reece on 3/19/2024  8:56 AM.            Assessment /Plan     For exam results, see Encounter Report.    Post-operative state    Nuclear sclerotic cataract of right eye      Slit lamp exam:  L/L: nl  K: clear, wound sealed  AC: 1+ cell  Lens: IOL centered and stable    POD1 s/p Phaco/IOL  Appropriate precautions and post op medications reviewed.  Patient instructed to call or come in if symptoms of redness, decreased vision, or pain are experienced.

## 2024-04-22 ENCOUNTER — OFFICE VISIT (OUTPATIENT)
Dept: OBSTETRICS AND GYNECOLOGY | Facility: CLINIC | Age: 64
End: 2024-04-22
Payer: MEDICARE

## 2024-04-22 VITALS
WEIGHT: 139.31 LBS | BODY MASS INDEX: 25.48 KG/M2 | DIASTOLIC BLOOD PRESSURE: 80 MMHG | SYSTOLIC BLOOD PRESSURE: 136 MMHG

## 2024-04-22 DIAGNOSIS — Z01.419 ENCOUNTER FOR GYNECOLOGICAL EXAMINATION: Primary | ICD-10-CM

## 2024-04-22 DIAGNOSIS — Z12.31 ENCOUNTER FOR SCREENING MAMMOGRAM FOR BREAST CANCER: ICD-10-CM

## 2024-04-22 PROCEDURE — 1159F MED LIST DOCD IN RCRD: CPT | Mod: CPTII,S$GLB,, | Performed by: OBSTETRICS & GYNECOLOGY

## 2024-04-22 PROCEDURE — 3079F DIAST BP 80-89 MM HG: CPT | Mod: CPTII,S$GLB,, | Performed by: OBSTETRICS & GYNECOLOGY

## 2024-04-22 PROCEDURE — 3288F FALL RISK ASSESSMENT DOCD: CPT | Mod: CPTII,S$GLB,, | Performed by: OBSTETRICS & GYNECOLOGY

## 2024-04-22 PROCEDURE — 1101F PT FALLS ASSESS-DOCD LE1/YR: CPT | Mod: CPTII,S$GLB,, | Performed by: OBSTETRICS & GYNECOLOGY

## 2024-04-22 PROCEDURE — 3075F SYST BP GE 130 - 139MM HG: CPT | Mod: CPTII,S$GLB,, | Performed by: OBSTETRICS & GYNECOLOGY

## 2024-04-22 PROCEDURE — 1126F AMNT PAIN NOTED NONE PRSNT: CPT | Mod: CPTII,S$GLB,, | Performed by: OBSTETRICS & GYNECOLOGY

## 2024-04-22 PROCEDURE — G0101 CA SCREEN;PELVIC/BREAST EXAM: HCPCS | Mod: S$GLB,,, | Performed by: OBSTETRICS & GYNECOLOGY

## 2024-04-22 PROCEDURE — 99999 PR PBB SHADOW E&M-EST. PATIENT-LVL III: CPT | Mod: PBBFAC,,, | Performed by: OBSTETRICS & GYNECOLOGY

## 2024-04-22 RX ORDER — RESPIRATORY SYNCYTIAL VISUS VACCINE RECOMBINANT, ADJUVANTED 120MCG/0.5
KIT INTRAMUSCULAR
COMMUNITY
Start: 2024-02-02

## 2024-04-22 RX ORDER — PNEUMOCOCCAL 20-VALENT CONJUGATE VACCINE 2.2; 2.2; 2.2; 2.2; 2.2; 2.2; 2.2; 2.2; 2.2; 2.2; 2.2; 2.2; 2.2; 2.2; 2.2; 2.2; 4.4; 2.2; 2.2; 2.2 UG/.5ML; UG/.5ML; UG/.5ML; UG/.5ML; UG/.5ML; UG/.5ML; UG/.5ML; UG/.5ML; UG/.5ML; UG/.5ML; UG/.5ML; UG/.5ML; UG/.5ML; UG/.5ML; UG/.5ML; UG/.5ML; UG/.5ML; UG/.5ML; UG/.5ML; UG/.5ML
INJECTION, SUSPENSION INTRAMUSCULAR
COMMUNITY
Start: 2024-02-02

## 2024-04-22 NOTE — PROGRESS NOTES
Subjective:       Patient ID: uLisa Padilla is a 64 y.o. female.    Chief Complaint:  Well Woman (Last pap- 2020 normal /Last hpv- 2020 negative /Last mmg- 2023 birads- 1/Post menopause)      History of Present Illness  - here for annual. No complaints.    Past Medical History:   Diagnosis Date    BRCA gene mutation negative     BRCA1 negative     BRCA2 negative     Cataract     Epiretinal membrane (ERM) of left eye     Menopause     HOLLAND (obstructive sleep apnea)     mild, no need of CPAP    Seasonal allergies        Past Surgical History:   Procedure Laterality Date    CATARACT EXTRACTION W/  INTRAOCULAR LENS IMPLANT Left 2024    Procedure: EXTRACTION, CATARACT, WITH IOL INSERTION;  Surgeon: Luis Antonio Ray MD;  Location: [facility] OR;  Service: Ophthalmology;  Laterality: Left;    CATARACT EXTRACTION W/  INTRAOCULAR LENS IMPLANT Right 3/18/2024    Procedure: EXTRACTION, CATARACT, WITH IOL INSERTION;  Surgeon: Luis Antonio Ray MD;  Location: [facility] OR;  Service: Ophthalmology;  Laterality: Right;  ORA ONLY RIGHT EYE     SECTION      x 2    COLONOSCOPY N/A 4/10/2019    Procedure: COLONOSCOPY;  Surgeon: Lauren Lopez MD;  Location: [facility] ENDO;  Service: Endoscopy;  Laterality: N/A;    EYE SURGERY Left     TENOSYNOVECTOMY, FOREARM OR WRIST Left 2023    Procedure: TENOSYNOVECTOMY, THUMB;  Surgeon: Ale Elkins MD;  Location: [facility] OR;  Service: Orthopedics;  Laterality: Left;  THUMB    VITRECTOMY BY PARS PLANA APPROACH Left 2020    Procedure: VITRECTOMY, PARS PLANA APPROACH;  Surgeon: QUINTIN Copeland MD;  Location: [address];  Service: Ophthalmology;  Laterality: Left;  30 min        Family History   Problem Relation Name Age of Onset    Breast cancer Sister  48    BRCA 1/2 Sister      Breast cancer Paternal Aunt      No Known Problems Mother      Pulmonary fibrosis Father          Cardiac Drug induced    No Known Problems Brother      Thyroid disease Maternal Grandmother       Thyroid nodules Cousin      Colon cancer Neg Hx      Ovarian cancer Neg Hx          Social History     Socioeconomic History    Marital status:    Tobacco Use    Smoking status: Never    Smokeless tobacco: Never   Substance and Sexual Activity    Alcohol use: Yes     Alcohol/week: 2.0 standard drinks of alcohol     Types: 2 Glasses of wine per week     Comment: a day    Sexual activity: Not Currently           Objective:     Vitals:    04/22/24 1510   BP: 136/80   Weight: 63.2 kg (139 lb 5.3 oz)       Physical Exam:   Constitutional: She is oriented to person, place, and time. She appears well-developed and well-nourished.        Pulmonary/Chest: Right breast exhibits no mass, no nipple discharge, no skin change, no tenderness and no swelling. Left breast exhibits no mass, no nipple discharge, no skin change, no tenderness and no swelling. Breasts are symmetrical.        Abdominal: Soft. She exhibits no distension. There is no abdominal tenderness.     Genitourinary:    Vagina and uterus normal.   There is no tenderness or lesion on the right labia. There is no tenderness or lesion on the left labia. Cervix is normal. Right adnexum displays no mass, no tenderness and no fullness. Left adnexum displays no mass, no tenderness and no fullness. No vaginal discharge in the vagina.    pap smear completed          Musculoskeletal: Moves all extremeties.       Neurological: She is alert and oriented to person, place, and time.     Psychiatric: She has a normal mood and affect.        A female chaperone was present for exam.    Assessment/ Plan:          Luisa was seen today for well woman.    Diagnoses and all orders for this visit:    Encounter for gynecological examination        Follow up in about 2 years (around 4/22/2026) for annual exam.    As of April 1, 2021, the Cures Act has been passed nationally. This new law requires that all doctors progress notes, lab results, pathology reports and radiology reports  be released IMMEDIATELY to the patient in the patient portal. That means that the results are released to you at the EXACT same time they are released to me. Therefore, with all of the patients that I have I am not able to reply to each patient exactly when the results come in. So there will be a delay from when you see the results to when I see them and have time to come up with a response to send you. Also I only see these results when I am on the computer at work. So if the results come in over the weekend or after 5 pm of a work day, I will not see them until the next business day. As you can tell, this is a challenge as a physician to give every patient the quick response they hope for and deserve. So please be patient!   Thanks for your understanding and patience.

## 2024-05-01 ENCOUNTER — OFFICE VISIT (OUTPATIENT)
Dept: OPTOMETRY | Facility: CLINIC | Age: 64
End: 2024-05-01
Payer: MEDICARE

## 2024-05-01 DIAGNOSIS — H02.834 DERMATOCHALASIS OF UPPER AND LOWER EYELIDS OF BOTH EYES: ICD-10-CM

## 2024-05-01 DIAGNOSIS — H02.835 DERMATOCHALASIS OF UPPER AND LOWER EYELIDS OF BOTH EYES: ICD-10-CM

## 2024-05-01 DIAGNOSIS — H04.123 DRY EYE SYNDROME, BILATERAL: ICD-10-CM

## 2024-05-01 DIAGNOSIS — Z96.1 PSEUDOPHAKIA OF BOTH EYES: Primary | ICD-10-CM

## 2024-05-01 DIAGNOSIS — H35.372 EPIRETINAL MEMBRANE (ERM) OF LEFT EYE: ICD-10-CM

## 2024-05-01 DIAGNOSIS — H02.831 DERMATOCHALASIS OF UPPER AND LOWER EYELIDS OF BOTH EYES: ICD-10-CM

## 2024-05-01 DIAGNOSIS — H02.832 DERMATOCHALASIS OF UPPER AND LOWER EYELIDS OF BOTH EYES: ICD-10-CM

## 2024-05-01 PROCEDURE — 99024 POSTOP FOLLOW-UP VISIT: CPT | Mod: S$GLB,,, | Performed by: OPTOMETRIST

## 2024-05-01 PROCEDURE — 3288F FALL RISK ASSESSMENT DOCD: CPT | Mod: CPTII,S$GLB,, | Performed by: OPTOMETRIST

## 2024-05-01 PROCEDURE — 99999 PR PBB SHADOW E&M-EST. PATIENT-LVL III: CPT | Mod: PBBFAC,,, | Performed by: OPTOMETRIST

## 2024-05-01 PROCEDURE — 1101F PT FALLS ASSESS-DOCD LE1/YR: CPT | Mod: CPTII,S$GLB,, | Performed by: OPTOMETRIST

## 2024-05-01 PROCEDURE — 1126F AMNT PAIN NOTED NONE PRSNT: CPT | Mod: CPTII,S$GLB,, | Performed by: OPTOMETRIST

## 2024-05-01 PROCEDURE — 1159F MED LIST DOCD IN RCRD: CPT | Mod: CPTII,S$GLB,, | Performed by: OPTOMETRIST

## 2024-05-01 RX ORDER — CYCLOSPORINE 0.5 MG/ML
1 EMULSION OPHTHALMIC 2 TIMES DAILY
Qty: 180 EACH | Refills: 3 | Status: SHIPPED | OUTPATIENT
Start: 2024-05-01 | End: 2024-06-11

## 2024-05-02 PROBLEM — H25.13 NS (NUCLEAR SCLEROSIS), BILATERAL: Status: RESOLVED | Noted: 2020-11-05 | Resolved: 2024-05-02

## 2024-05-02 NOTE — PROGRESS NOTES
HPI    DLS: 3/19/24 - Dr. Ray  1 month PO    Pt is here today for 1 mo post op cat sx. Pt states that her distance   vision was clear first after cat sx but currently it seems to have   slightly declined. Pt states that she has noticed dryness with her eyes   and it's worse during the evenings.    GTTS:   AT's PRN   Last edited by Marguerite Ace MA on 5/1/2024  8:29 AM.            Assessment /Plan     For exam results, see Encounter Report.    Pseudophakia of both eyes   Cory Feb/ March 2024    Epiretinal membrane (ERM) of left eye   ERM OS w/ decreased Va and MMP      S/p  25g PPV/ILM peel/AFx OS for ERM 12/9/20       Dry eye syndrome, bilateral   Start cycloSPORINE (RESTASIS) 0.05 % ophthalmic emulsion; Place 1 drop into both eyes 2 (two) times daily.  Dispense: 180 each; Refill: 3    Dermatochalasis of upper and lower eyelids of both eyes   Consult Dr. Elder for eval      RTC 1 year

## 2024-06-05 ENCOUNTER — HOSPITAL ENCOUNTER (OUTPATIENT)
Dept: RADIOLOGY | Facility: HOSPITAL | Age: 64
Discharge: HOME | End: 2024-06-05
Attending: OBSTETRICS & GYNECOLOGY
Payer: MEDICARE

## 2024-06-05 VITALS — BODY MASS INDEX: 25.58 KG/M2 | HEIGHT: 62 IN | WEIGHT: 139 LBS

## 2024-06-05 DIAGNOSIS — Z12.31 ENCOUNTER FOR SCREENING MAMMOGRAM FOR BREAST CANCER: ICD-10-CM

## 2024-06-05 PROCEDURE — 77063 BREAST TOMOSYNTHESIS BI: CPT | Mod: TC

## 2024-06-05 PROCEDURE — 77063 BREAST TOMOSYNTHESIS BI: CPT | Mod: 26,,, | Performed by: RADIOLOGY

## 2024-06-05 PROCEDURE — 77067 SCR MAMMO BI INCL CAD: CPT | Mod: 26,,, | Performed by: RADIOLOGY

## 2024-06-11 ENCOUNTER — PATIENT MESSAGE (OUTPATIENT)
Dept: OPTOMETRY | Facility: CLINIC | Age: 64
End: 2024-06-11
Payer: MEDICARE

## 2024-06-12 ENCOUNTER — PATIENT MESSAGE (OUTPATIENT)
Dept: OPHTHALMOLOGY | Facility: CLINIC | Age: 64
End: 2024-06-12
Payer: MEDICARE

## 2024-06-12 RX ORDER — CYCLOSPORINE OPHTHALMIC SOLUTION 1 MG/ML
1 SOLUTION/ DROPS OPHTHALMIC 2 TIMES DAILY
Qty: 2 ML | Refills: 11 | Status: SHIPPED | OUTPATIENT
Start: 2024-06-12 | End: 2025-06-12

## 2024-07-15 DIAGNOSIS — F51.01 PRIMARY INSOMNIA: ICD-10-CM

## 2024-07-15 RX ORDER — TRAZODONE HYDROCHLORIDE 50 MG/1
50 TABLET ORAL NIGHTLY
Qty: 90 TABLET | Refills: 0 | Status: SHIPPED | OUTPATIENT
Start: 2024-07-15

## 2024-07-16 NOTE — TELEPHONE ENCOUNTER
Refill Decision Note   Luisa Valerie  is requesting a refill authorization.  Brief Assessment and Rationale for Refill:  Approve     Medication Therapy Plan:         Comments:     Note composed:9:38 PM 07/15/2024

## 2024-07-16 NOTE — TELEPHONE ENCOUNTER
No care due was identified.  [facility] Embedded Care Due Messages. Reference number: [id number].   7/15/2024 8:28:33 PM CDT

## 2024-07-31 ENCOUNTER — PATIENT MESSAGE (OUTPATIENT)
Dept: PRIMARY CARE CLINIC | Facility: CLINIC | Age: 64
End: 2024-07-31
Payer: MEDICARE

## 2024-08-06 ENCOUNTER — PATIENT MESSAGE (OUTPATIENT)
Dept: PRIMARY CARE CLINIC | Facility: CLINIC | Age: 64
End: 2024-08-06
Payer: MEDICARE

## 2024-08-12 ENCOUNTER — TELEPHONE (OUTPATIENT)
Dept: PRIMARY CARE CLINIC | Facility: CLINIC | Age: 64
End: 2024-08-12
Payer: MEDICARE

## 2024-08-12 DIAGNOSIS — R79.1 ABNORMAL COAGULATION PROFILE: ICD-10-CM

## 2024-08-12 DIAGNOSIS — Z01.818 PRE-OP TESTING: Primary | ICD-10-CM

## 2024-08-12 DIAGNOSIS — R73.03 PREDIABETES: ICD-10-CM

## 2024-08-12 NOTE — TELEPHONE ENCOUNTER
----- Message from Omar Jonas sent at 8/12/2024 11:10 AM CDT -----  Regarding: Pt Callback  Contact: Pt [phone]  Type: Returning a call    Who left a message? Donna Dean ? She was not sure    When did the practice call? today    Does patient know what this is regarding: Preop for Surgery    Would the patient rather a call back or a response via [facility]? Call    Comments:

## 2024-08-14 ENCOUNTER — LAB VISIT (OUTPATIENT)
Dept: LAB | Facility: HOSPITAL | Age: 64
End: 2024-08-14
Attending: INTERNAL MEDICINE
Payer: MEDICARE

## 2024-08-14 DIAGNOSIS — R79.1 ABNORMAL COAGULATION PROFILE: ICD-10-CM

## 2024-08-14 DIAGNOSIS — Z01.818 PRE-OP TESTING: ICD-10-CM

## 2024-08-14 LAB
ALBUMIN SERPL BCP-MCNC: 3.8 G/DL (ref 3.5–5.2)
ALP SERPL-CCNC: 57 U/L (ref 55–135)
ALT SERPL W/O P-5'-P-CCNC: 34 U/L (ref 10–44)
ANION GAP SERPL CALC-SCNC: 9 MMOL/L (ref 8–16)
AST SERPL-CCNC: 25 U/L (ref 10–40)
BILIRUB SERPL-MCNC: 0.4 MG/DL (ref 0.1–1)
BUN SERPL-MCNC: 18 MG/DL (ref 8–23)
CALCIUM SERPL-MCNC: 9.2 MG/DL (ref 8.7–10.5)
CHLORIDE SERPL-SCNC: 106 MMOL/L (ref 95–110)
CO2 SERPL-SCNC: 25 MMOL/L (ref 23–29)
CREAT SERPL-MCNC: 0.8 MG/DL (ref 0.5–1.4)
ERYTHROCYTE [DISTWIDTH] IN BLOOD BY AUTOMATED COUNT: 14.8 % (ref 11.5–14.5)
EST. GFR  (NO RACE VARIABLE): >60 ML/MIN/1.73 M^2
GLUCOSE SERPL-MCNC: 93 MG/DL (ref 70–110)
HCT VFR BLD AUTO: 39.5 % (ref 37–48.5)
HGB BLD-MCNC: 12.5 G/DL (ref 12–16)
INR PPP: 1 (ref 0.8–1.2)
MCH RBC QN AUTO: 30.6 PG (ref 27–31)
MCHC RBC AUTO-ENTMCNC: 31.6 G/DL (ref 32–36)
MCV RBC AUTO: 97 FL (ref 82–98)
PLATELET # BLD AUTO: 169 K/UL (ref 150–450)
PMV BLD AUTO: 11.3 FL (ref 9.2–12.9)
POTASSIUM SERPL-SCNC: 4.2 MMOL/L (ref 3.5–5.1)
PROT SERPL-MCNC: 6.7 G/DL (ref 6–8.4)
PROTHROMBIN TIME: 11 SEC (ref 9–12.5)
RBC # BLD AUTO: 4.08 M/UL (ref 4–5.4)
SODIUM SERPL-SCNC: 140 MMOL/L (ref 136–145)
WBC # BLD AUTO: 4.79 K/UL (ref 3.9–12.7)

## 2024-08-14 PROCEDURE — 36415 COLL VENOUS BLD VENIPUNCTURE: CPT | Performed by: INTERNAL MEDICINE

## 2024-08-14 PROCEDURE — 80053 COMPREHEN METABOLIC PANEL: CPT | Performed by: INTERNAL MEDICINE

## 2024-08-14 PROCEDURE — 85610 PROTHROMBIN TIME: CPT | Performed by: INTERNAL MEDICINE

## 2024-08-14 PROCEDURE — 85027 COMPLETE CBC AUTOMATED: CPT | Performed by: INTERNAL MEDICINE

## 2024-08-16 ENCOUNTER — OFFICE VISIT (OUTPATIENT)
Dept: PRIMARY CARE CLINIC | Facility: CLINIC | Age: 64
End: 2024-08-16
Payer: MEDICARE

## 2024-08-16 VITALS
HEIGHT: 62 IN | OXYGEN SATURATION: 99 % | WEIGHT: 138.88 LBS | BODY MASS INDEX: 25.55 KG/M2 | SYSTOLIC BLOOD PRESSURE: 128 MMHG | HEART RATE: 62 BPM | DIASTOLIC BLOOD PRESSURE: 68 MMHG

## 2024-08-16 DIAGNOSIS — M62.830 BACK MUSCLE SPASM: ICD-10-CM

## 2024-08-16 DIAGNOSIS — Z01.810 PREOP CARDIOVASCULAR EXAM: Primary | ICD-10-CM

## 2024-08-16 PROCEDURE — 99999 PR PBB SHADOW E&M-EST. PATIENT-LVL IV: CPT | Mod: PBBFAC,,, | Performed by: INTERNAL MEDICINE

## 2024-08-16 RX ORDER — CYCLOBENZAPRINE HCL 5 MG
5 TABLET ORAL 3 TIMES DAILY PRN
Qty: 21 TABLET | Refills: 1 | Status: SHIPPED | OUTPATIENT
Start: 2024-08-16 | End: 2024-08-26

## 2024-08-16 NOTE — PROGRESS NOTES
[facility] Note    Chief Complaint      Chief Complaint   Patient presents with    Pre-op Exam     Cosmetic sx on 8/19    Back Pain     X 1 day, possible back strain last night      History of Present Illness      Luisa Padilla is a 64 y.o. female who presents today for chief complaint preop.   HPI     Hx of  Prediabetic A1c diet controlled and non concerning ASCVD risk score comes today for preop clearance       Referred here by Dr [facility] for pre-op evaluation in anticipation of surgery (cosmetic surgery of eyes) scheduled for 8.19.24.  Risk factors include: none.  Patient is able to do light housework.  Patient is able to walk a block.  Patient is able to climb a flight of stairs.  Patient is able to do heavy housework or participate in strenuous sports.  Other symptoms include none. She does zhang and step 5-6 day a week       Patient is a low risk patient for a low risk procedure.  Patient is medically optimized for surgery.  Recommend proceed .      She is having acute low back pain as a result of some movement she did, thinks her zhang shoes lack adequate support, has done well with flexeril in the patst   Active Problem List with Overview Notes    Diagnosis Date Noted    Pain of left thumb 08/02/2023    Decreased range of motion of left thumb 08/02/2023    Vitamin D deficiency 12/17/2021    Mixed hyperlipidemia 12/17/2021    Overweight 12/17/2021    Primary insomnia 12/17/2021    Prediabetes 04/01/2021    Epiretinal membrane (ERM) of left eye 12/09/2020       Health Maintenance   Topic Date Due    Mammogram  06/05/2025    DEXA Scan  05/01/2026    Lipid Panel  05/05/2028    Colorectal Cancer Screening  04/10/2029    TETANUS VACCINE  04/01/2031    Hepatitis C Screening  Completed    Shingles Vaccine  Completed       Past Medical History:   Diagnosis Date    BRCA gene mutation negative     BRCA1 negative     BRCA2 negative     Cataract     Epiretinal membrane (ERM) of left eye      Menopause     HOLLAND (obstructive sleep apnea)     mild, no need of CPAP    Seasonal allergies        Past Surgical History:   Procedure Laterality Date    CATARACT EXTRACTION W/  INTRAOCULAR LENS IMPLANT Left 2024    Procedure: EXTRACTION, CATARACT, WITH IOL INSERTION;  Surgeon: Luis Antonio Ray MD;  Location: [facility] OR;  Service: Ophthalmology;  Laterality: Left;    CATARACT EXTRACTION W/  INTRAOCULAR LENS IMPLANT Right 2024    Procedure: EXTRACTION, CATARACT, WITH IOL INSERTION;  Surgeon: Luis Antonio Ray MD;  Location: [facility] OR;  Service: Ophthalmology;  Laterality: Right;  ORA ONLY RIGHT EYE     SECTION      x 2    COLONOSCOPY N/A 04/10/2019    Procedure: COLONOSCOPY;  Surgeon: Lauren Lopez MD;  Location: [facility] ENDO;  Service: Endoscopy;  Laterality: N/A;    EYE SURGERY Left     TENOSYNOVECTOMY, FOREARM OR WRIST Left 2023    Procedure: TENOSYNOVECTOMY, THUMB;  Surgeon: Ale Elkins MD;  Location: [facility] OR;  Service: Orthopedics;  Laterality: Left;  THUMB    VITRECTOMY BY PARS PLANA APPROACH Left 2020    Procedure: VITRECTOMY, PARS PLANA APPROACH;  Surgeon: QUINTIN Copeland MD;  Location: [address];  Service: Ophthalmology;  Laterality: Left;  30 min       family history includes BRCA 1/2 in her sister; Breast cancer in her paternal aunt; Breast cancer (age of onset: 48) in her sister; Cancer in her sister; No Known Problems in her brother and mother; Pulmonary fibrosis in her father; Thyroid disease in her maternal grandmother; Thyroid nodules in her cousin.    Social History     Tobacco Use    Smoking status: Former     Types: Cigarettes    Smokeless tobacco: Never   Substance Use Topics    Alcohol use: Yes     Alcohol/week: 6.0 standard drinks of alcohol     Types: 6 Glasses of wine per week     Comment: a day    Drug use: No     Comment: CBD at night       Review of Systems   Constitutional:  Negative for chills, fever, malaise/fatigue and weight loss.    Respiratory:  Negative for cough, sputum production, shortness of breath and wheezing.    Cardiovascular:  Negative for chest pain, palpitations, orthopnea and leg swelling.   Gastrointestinal:  Negative for constipation, diarrhea, nausea and vomiting.   Genitourinary:  Negative for dysuria, frequency, hematuria and urgency.        Outpatient Encounter Medications as of 8/16/2024   Medication Sig Note Dispense Refill    AREXVY, PF, 120 mcg/0.5 mL SusR vaccine        biotin 1 mg tablet Take 1,000 mcg by mouth 3 (three) times daily. 3/15/2024: HOLD AM OF PROCEDURE          cholecalciferol, vitamin D3, (VITAMIN D3) 25 mcg (1,000 unit) capsule Take 1 capsule (1,000 Units total) by mouth once daily. 3/15/2024: HOLD AM OF PROCEDURE     30 capsule 11    cycloSPORINE (VEVYE) 0.1 % Drop Place 1 drop into both eyes 2 (two) times a day.  2 mL 11    fexofenadine (ALLEGRA) 30 MG tablet Take 30 mg by mouth once daily.       glucosamine-chondroitin 500-400 mg tablet Take 1 tablet by mouth 3 (three) times daily.       mometasone 0.1% (ELOCON) 0.1 % cream        prednisolon/gatiflox/bromfenac (PREDNISOL ACE-GATIFLOX-BROMFEN) 1-0.5-0.075 % DrpS Apply 1 drop to eye 3 (three) times daily. in operative eye for 1 month after surgery  5 mL 3    PREVNAR 20, PF, 0.5 mL Syrg injection        psyllium 0.52 gram capsule Take 0.52 g by mouth once daily.       traZODone (DESYREL) 50 MG tablet Take 1 tablet by mouth in the evening  90 tablet 0    vit C/E/zinc ox/lester/lut/zeax (ICAPS AREDS2 ORAL) Take by mouth. 3/15/2024: HOLD AM OF PROCEDURE          cyclobenzaprine (FLEXERIL) 5 MG tablet Take 1 tablet (5 mg total) by mouth 3 (three) times daily as needed for Muscle spasms.  21 tablet 1     Facility-Administered Encounter Medications as of 8/16/2024   Medication Dose Route Frequency Provider Last Rate Last Admin    sodium chloride 0.9% flush 10 mL  10 mL Intravenous PRN Luis Antonio Ray MD            Review of patient's allergies indicates:  "  Allergen Reactions    Latex, natural rubber Hives and Itching           Physical Exam      Vital Signs  Pulse: 62  SpO2: 99 %  BP: 128/68  BP Location: Left arm  Patient Position: Sitting  Pain Score:   4  Height and Weight  Height: 5' 2" (157.5 cm)  Weight: 63 kg (138 lb 14.2 oz)  BSA (Calculated - sq m): 1.66 sq meters  BMI (Calculated): 25.4  Weight in (lb) to have BMI = 25: 136.4]    Physical Exam  Vitals reviewed.   Constitutional:       General: She is not in acute distress.     Appearance: She is well-developed. She is not diaphoretic.   HENT:      Head: Normocephalic and atraumatic.      Right Ear: External ear normal.      Left Ear: External ear normal.      Nose: Nose normal.   Eyes:      General:         Right eye: No discharge.         Left eye: No discharge.      Conjunctiva/sclera: Conjunctivae normal.   Cardiovascular:      Rate and Rhythm: Normal rate and regular rhythm.      Heart sounds: Normal heart sounds.   Pulmonary:      Effort: Pulmonary effort is normal. No respiratory distress.      Breath sounds: Normal breath sounds.   Musculoskeletal:         General: Normal range of motion.      Cervical back: Normal range of motion.   Skin:     Coloration: Skin is not pale.      Findings: No rash.   Neurological:      Mental Status: She is alert and oriented to person, place, and time.   Psychiatric:         Behavior: Behavior normal.         Thought Content: Thought content normal.          Laboratory:  CBC:  Recent Labs   Lab Result Units 08/14/24  1243   WBC K/uL 4.79   RBC M/uL 4.08   Hemoglobin g/dL 12.5   Hematocrit % 39.5   Platelets K/uL 169   MCV fL 97   MCH pg 30.6   MCHC g/dL 31.6*     CMP:  Recent Labs   Lab Result Units 08/14/24  1243   Glucose mg/dL 93   Calcium mg/dL 9.2   Albumin g/dL 3.8   Total Protein g/dL 6.7   Sodium mmol/L 140   Potassium mmol/L 4.2   CO2 mmol/L 25   Chloride mmol/L 106   BUN mg/dL 18   Alkaline Phosphatase U/L 57   ALT U/L 34   AST U/L 25   Total Bilirubin " "mg/dL 0.4     URINALYSIS:  No results for input(s): "COLORU", "CLARITYU", "SPECGRAV", "PHUR", "PROTEINUA", "GLUCOSEU", "BILIRUBINCON", "BLOODU", "WBCU", "RBCU", "BACTERIA", "MUCUS", "NITRITE", "LEUKOCYTESUR", "UROBILINOGEN", "HYALINECASTS" in the last 2160 hours.   LIPIDS:  No results for input(s): "TSH", "HDL", "CHOL", "TRIG", "LDLCALC", "CHOLHDL", "NONHDLCHOL", "TOTALCHOLEST" in the last 2160 hours.  TSH:  No results for input(s): "TSH" in the last 2160 hours.  A1C:  No results for input(s): "HGBA1C" in the last 2160 hours.        Assessment/Plan     Luisa Padilla is a 64 y.o.female with:    1. Preop cardiovascular exam    2. Back muscle spasm  -     cyclobenzaprine (FLEXERIL) 5 MG tablet; Take 1 tablet (5 mg total) by mouth 3 (three) times daily as needed for Muscle spasms.  Dispense: 21 tablet; Refill: 1         Mayelin Herman MD  8/16/2024 8:57 AM    [facility] Care Internal Medicine                     "

## 2024-10-12 DIAGNOSIS — F51.01 PRIMARY INSOMNIA: ICD-10-CM

## 2024-10-12 NOTE — TELEPHONE ENCOUNTER
No care due was identified.  Health [facility] Embedded Care Due Messages. Reference number: [id number].   10/12/2024 9:14:58 AM CDT

## 2024-10-14 RX ORDER — TRAZODONE HYDROCHLORIDE 50 MG/1
50 TABLET ORAL NIGHTLY
Qty: 90 TABLET | Refills: 0 | Status: SHIPPED | OUTPATIENT
Start: 2024-10-14

## 2024-12-23 ENCOUNTER — PATIENT MESSAGE (OUTPATIENT)
Dept: PRIMARY CARE CLINIC | Facility: CLINIC | Age: 64
End: 2024-12-23
Payer: MEDICARE

## 2025-01-02 ENCOUNTER — PATIENT MESSAGE (OUTPATIENT)
Dept: PRIMARY CARE CLINIC | Facility: CLINIC | Age: 65
End: 2025-01-02
Payer: MEDICARE

## 2025-01-10 ENCOUNTER — PATIENT MESSAGE (OUTPATIENT)
Dept: PRIMARY CARE CLINIC | Facility: CLINIC | Age: 65
End: 2025-01-10
Payer: MEDICARE

## 2025-01-10 ENCOUNTER — OFFICE VISIT (OUTPATIENT)
Dept: PRIMARY CARE CLINIC | Facility: CLINIC | Age: 65
End: 2025-01-10
Payer: MEDICARE

## 2025-01-10 VITALS
DIASTOLIC BLOOD PRESSURE: 68 MMHG | SYSTOLIC BLOOD PRESSURE: 136 MMHG | BODY MASS INDEX: 26.33 KG/M2 | HEART RATE: 78 BPM | WEIGHT: 143.06 LBS | OXYGEN SATURATION: 99 % | HEIGHT: 62 IN

## 2025-01-10 DIAGNOSIS — M54.50 ACUTE LEFT-SIDED LOW BACK PAIN WITHOUT SCIATICA: Primary | ICD-10-CM

## 2025-01-10 DIAGNOSIS — F41.9 ANXIETY: ICD-10-CM

## 2025-01-10 PROCEDURE — 3008F BODY MASS INDEX DOCD: CPT | Mod: CPTII,S$GLB,, | Performed by: NURSE PRACTITIONER

## 2025-01-10 PROCEDURE — 1159F MED LIST DOCD IN RCRD: CPT | Mod: CPTII,S$GLB,, | Performed by: NURSE PRACTITIONER

## 2025-01-10 PROCEDURE — 99999 PR PBB SHADOW E&M-EST. PATIENT-LVL III: CPT | Mod: PBBFAC,,, | Performed by: NURSE PRACTITIONER

## 2025-01-10 PROCEDURE — 3078F DIAST BP <80 MM HG: CPT | Mod: CPTII,S$GLB,, | Performed by: NURSE PRACTITIONER

## 2025-01-10 PROCEDURE — 3075F SYST BP GE 130 - 139MM HG: CPT | Mod: CPTII,S$GLB,, | Performed by: NURSE PRACTITIONER

## 2025-01-10 PROCEDURE — 99214 OFFICE O/P EST MOD 30 MIN: CPT | Mod: S$GLB,,, | Performed by: NURSE PRACTITIONER

## 2025-01-10 RX ORDER — MELOXICAM 7.5 MG/1
7.5 TABLET ORAL DAILY
Qty: 20 TABLET | Refills: 0 | Status: SHIPPED | OUTPATIENT
Start: 2025-01-10

## 2025-01-10 RX ORDER — ESCITALOPRAM OXALATE 5 MG/1
5 TABLET ORAL DAILY
Qty: 30 TABLET | Refills: 1 | Status: SHIPPED | OUTPATIENT
Start: 2025-01-10 | End: 2026-01-10

## 2025-01-10 RX ORDER — MELOXICAM 7.5 MG/1
7.5 TABLET ORAL DAILY
Qty: 20 TABLET | Refills: 0 | Status: SHIPPED | OUTPATIENT
Start: 2025-01-10 | End: 2025-01-10

## 2025-01-10 NOTE — PROGRESS NOTES
[facility] Note    Chief Complaint      Chief Complaint   Patient presents with    Back Pain     Flank and lower back on both sides   X 1 month        History of Present Illness      Luisa Padilla is a 64 y.o. female with chronic conditions of seasonal allergies, holland, menopause who presents today for: pt complaining of lower back pain more so left side, describes as weak and aches. Does not radiate. Denies numbness/tingling. Denies bowel/bladder incontinence. Does feel some improvement. Pt took muscle relaxer.  Did do a reformer Pilates class and did have some back pain. Does zhang/pilates 4x week. Had to stop due to back pain/weakness.     Pt also complaining of overwhelming family stress lately. Has been drinking 1-2 glasses of wine at night to try to cope. Denies si/hi/panic attacks. Interested in something to help.     Past Medical History:  Past Medical History:   Diagnosis Date    BRCA gene mutation negative     BRCA1 negative     BRCA2 negative     Cataract     Epiretinal membrane (ERM) of left eye     Menopause     HOLLAND (obstructive sleep apnea)     mild, no need of CPAP    Seasonal allergies        Past Surgical History:   has a past surgical history that includes  section; Colonoscopy (N/A, 04/10/2019); Vitrectomy by pars plana approach (Left, 2020); Eye surgery (Left); tenosynovectomy, forearm or wrist (Left, 2023); Cataract extraction w/  intraocular lens implant (Left, 2024); and Cataract extraction w/  intraocular lens implant (Right, 2024).    Family History:  family history includes BRCA 1/2 in her sister; Breast cancer in her paternal aunt; Breast cancer (age of onset: 48) in her sister; Cancer in her sister; No Known Problems in her brother and mother; Pulmonary fibrosis in her father; Thyroid disease in her maternal grandmother; Thyroid nodules in her cousin.     Social History:  Social History     Tobacco Use    Smoking status: Former     Types:  Cigarettes    Smokeless tobacco: Never   Substance Use Topics    Alcohol use: Yes     Alcohol/week: 6.0 standard drinks of alcohol     Types: 6 Glasses of wine per week     Comment: a day    Drug use: No     Comment: CBD at night       Review of Systems   Constitutional:  Negative for chills and fever.   Respiratory:  Negative for cough and shortness of breath.    Cardiovascular:  Negative for chest pain and palpitations.   Gastrointestinal:  Negative for constipation, diarrhea, nausea and vomiting.   Genitourinary:  Negative for dysuria, frequency, hematuria and urgency.   Musculoskeletal:  Positive for back pain. Negative for falls.   Neurological:  Negative for headaches.        Medications:  Outpatient Encounter Medications as of 1/10/2025   Medication Sig Note Dispense Refill    AREXVY, PF, 120 mcg/0.5 mL SusR vaccine        biotin 1 mg tablet Take 1,000 mcg by mouth 3 (three) times daily. 3/15/2024: HOLD AM OF PROCEDURE          cholecalciferol, vitamin D3, (VITAMIN D3) 25 mcg (1,000 unit) capsule Take 1 capsule (1,000 Units total) by mouth once daily. 3/15/2024: HOLD AM OF PROCEDURE     30 capsule 11    cycloSPORINE (VEVYE) 0.1 % Drop Place 1 drop into both eyes 2 (two) times a day.  2 mL 11    fexofenadine (ALLEGRA) 30 MG tablet Take 30 mg by mouth once daily.       glucosamine-chondroitin 500-400 mg tablet Take 1 tablet by mouth 3 (three) times daily.       mometasone 0.1% (ELOCON) 0.1 % cream        PREVNAR 20, PF, 0.5 mL Syrg injection        psyllium 0.52 gram capsule Take 0.52 g by mouth once daily.       traZODone (DESYREL) 50 MG tablet Take 1 tablet by mouth in the evening  90 tablet 0    vit C/E/zinc ox/lester/lut/zeax (ICAPS AREDS2 ORAL) Take by mouth. 3/15/2024: HOLD AM OF PROCEDURE          EScitalopram oxalate (LEXAPRO) 5 MG Tab Take 1 tablet (5 mg total) by mouth once daily.  30 tablet 1    meloxicam (MOBIC) 7.5 MG tablet Take 1 tablet (7.5 mg total) by mouth once daily.  20 tablet 0     "[DISCONTINUED] meloxicam (MOBIC) 7.5 MG tablet Take 1 tablet (7.5 mg total) by mouth once daily.  20 tablet 0    [DISCONTINUED] prednisolon/gatiflox/bromfenac (PREDNISOL ACE-GATIFLOX-BROMFEN) 1-0.5-0.075 % DrpS Apply 1 drop to eye 3 (three) times daily. in operative eye for 1 month after surgery  5 mL 3     Facility-Administered Encounter Medications as of 1/10/2025   Medication Dose Route Frequency Provider Last Rate Last Admin    sodium chloride 0.9% flush 10 mL  10 mL Intravenous PRN Luis Antonio Ray MD           Allergies:  Review of patient's allergies indicates:   Allergen Reactions    Latex, natural rubber Hives and Itching       Health Maintenance:  Immunization History   Administered Date(s) Administered    COVID-19 MRNA, LN-S PF (MODERNA HALF 0.25 ML DOSE) 11/04/2021, 05/14/2022    COVID-19, MRNA, LN-S, PF (MODERNA FULL 0.5 ML DOSE) 02/05/2021, 03/11/2021    Influenza 11/27/1999, 10/18/2022    Influenza - Quadrivalent 11/02/2014, 11/10/2017, 11/07/2020    Influenza - Quadrivalent - MDCK - PF 09/11/2018    Tdap 04/01/2021    Zoster Recombinant 09/26/2018, 07/28/2019      Health Maintenance   Topic Date Due    Hemoglobin A1c (Prediabetes)  05/05/2024    COVID-19 Vaccine (5 - 2024-25 season) 09/01/2024    Mammogram  06/05/2025    DEXA Scan  05/01/2026    Lipid Panel  05/05/2028    Colorectal Cancer Screening  04/10/2029    TETANUS VACCINE  04/01/2031    Hepatitis C Screening  Completed    Shingles Vaccine  Completed    Influenza Vaccine  Completed    RSV Vaccine (Age 60+ and Pregnant patients)  Completed    Pneumococcal Vaccines (Age 50+)  Completed        Physical Exam      Vital Signs  Pulse: 78  SpO2: 99 %  BP: 136/68  BP Location: Right arm  Patient Position: Sitting  Height and Weight  Height: 5' 2" (157.5 cm)  Weight: 64.9 kg (143 lb 1.3 oz)  BSA (Calculated - sq m): 1.68 sq meters  BMI (Calculated): 26.2  Weight in (lb) to have BMI = 25: 136.4]    Physical Exam  Constitutional:       Appearance: She is " well-developed.   HENT:      Head: Normocephalic and atraumatic.   Cardiovascular:      Rate and Rhythm: Normal rate and regular rhythm.      Heart sounds: Normal heart sounds. No murmur heard.  Pulmonary:      Effort: Pulmonary effort is normal. No respiratory distress.      Breath sounds: Normal breath sounds.   Abdominal:      General: There is no distension.      Palpations: Abdomen is soft.      Tenderness: There is no abdominal tenderness. There is no right CVA tenderness, left CVA tenderness or guarding.   Musculoskeletal:         General: No tenderness. Normal range of motion.      Comments: - neg straight leg.   Skin:     General: Skin is warm and dry.   Neurological:      Mental Status: She is alert. Mental status is at baseline.   Psychiatric:         Behavior: Behavior normal.          Laboratory:  CBC:  Recent Labs   Lab 05/05/23 0904 12/04/23  0854 08/14/24  1243   WBC 4.0 4.71 4.79   RBC 4.43 4.49 4.08   Hemoglobin 13.5 13.6 12.5   Hematocrit 41.1 42.4 39.5   Platelets 186 179 169   MCV 92.8 94 97   MCH 30.5 30.3 30.6   MCHC 32.8 32.1 31.6 L     CMP:  Recent Labs   Lab 05/05/23  0904 05/05/23  0904 12/04/23  0854 08/14/24  1243   Glucose 102 H  --  100 93   Calcium 9.1  --  9.8 9.2   Albumin 4.4  --  4.1 3.8   Total Protein 6.7  --  7.3 6.7   Sodium 141  --  140 140   Potassium 4.1  --  4.5 4.2   CO2 28  --  26 25   Chloride 106  --  106 106   BUN 16  --  19 18   Alkaline Phosphatase  --    < > 67 57   ALT 35 H  --  41 34   AST 29  --  38 25   Total Bilirubin 0.5  --  0.6 0.4    < > = values in this interval not displayed.     URINALYSIS:       LIPIDS:  Recent Labs   Lab 05/05/23  0904 12/04/23  0854   TSH  --  1.093   HDL 77  --    Cholesterol 208 H  --    Triglycerides 73  --    LDL Cholesterol 114 H  --    HDL/Cholesterol Ratio 2.7  --    Non HDL Chol. (LDL+VLDL) 131 H  --      TSH:  Recent Labs   Lab 12/04/23  0854   TSH 1.093     A1C:  Recent Labs   Lab 05/05/23  0904   Hemoglobin A1C 5.5        Assessment/Plan     Luisa Padilla is a 64 y.o.female with:    1. Acute left-sided low back pain without sciatica  - meloxicam (MOBIC) 7.5 MG tablet; Take 1 tablet (7.5 mg total) by mouth once daily.  Dispense: 20 tablet; Refill: 0  - take with food, can take prilosec while taking this to help GI side effects  - short term interval to see if helps, if not recommend PT    2. Anxiety  - EScitalopram oxalate (LEXAPRO) 5 MG Tab; Take 1 tablet (5 mg total) by mouth once daily.  Dispense: 30 tablet; Refill: 1   Rtc in 2-4 weeks     Chronic conditions status updated as per HPI.  Other than changes above, cont current medications and maintain follow up with specialists.  No follow-ups on file.    No future appointments.    Vijaya Alfonso, FNP [facility]

## 2025-01-11 DIAGNOSIS — F51.01 PRIMARY INSOMNIA: ICD-10-CM

## 2025-01-11 NOTE — TELEPHONE ENCOUNTER
No care due was identified.  Health [facility] Embedded Care Due Messages. Reference number: [id number].   1/11/2025 9:12:44 AM CST

## 2025-01-13 RX ORDER — TRAZODONE HYDROCHLORIDE 50 MG/1
50 TABLET ORAL NIGHTLY
Qty: 90 TABLET | Refills: 0 | Status: SHIPPED | OUTPATIENT
Start: 2025-01-13

## 2025-01-22 ENCOUNTER — PATIENT MESSAGE (OUTPATIENT)
Dept: PRIMARY CARE CLINIC | Facility: CLINIC | Age: 65
End: 2025-01-22
Payer: MEDICARE

## 2025-01-22 DIAGNOSIS — Z82.49 FAMILY HISTORY OF HYPERTROPHIC CARDIOMYOPATHY: Primary | ICD-10-CM

## 2025-01-22 DIAGNOSIS — M54.50 ACUTE LEFT-SIDED LOW BACK PAIN WITHOUT SCIATICA: ICD-10-CM

## 2025-01-22 NOTE — TELEPHONE ENCOUNTER
Ok for back xray?   Would pt need to get cards genetic testing from a cardiologist? Or can you order?

## 2025-01-24 ENCOUNTER — HOSPITAL ENCOUNTER (OUTPATIENT)
Dept: RADIOLOGY | Facility: HOSPITAL | Age: 65
Discharge: HOME | End: 2025-01-24
Attending: NURSE PRACTITIONER
Payer: MEDICARE

## 2025-01-24 DIAGNOSIS — M54.50 ACUTE LEFT-SIDED LOW BACK PAIN WITHOUT SCIATICA: ICD-10-CM

## 2025-01-24 DIAGNOSIS — M43.16 ANTEROLISTHESIS OF LUMBAR SPINE: Primary | ICD-10-CM

## 2025-01-24 PROCEDURE — 72110 X-RAY EXAM L-2 SPINE 4/>VWS: CPT | Mod: 26,,, | Performed by: RADIOLOGY

## 2025-01-24 PROCEDURE — 72110 X-RAY EXAM L-2 SPINE 4/>VWS: CPT | Mod: TC

## 2025-01-25 ENCOUNTER — PATIENT MESSAGE (OUTPATIENT)
Dept: PRIMARY CARE CLINIC | Facility: CLINIC | Age: 65
End: 2025-01-25
Payer: MEDICARE

## 2025-01-29 ENCOUNTER — CLINICAL SUPPORT (OUTPATIENT)
Dept: REHABILITATION | Facility: HOSPITAL | Age: 65
End: 2025-01-29
Attending: NURSE PRACTITIONER
Payer: MEDICARE

## 2025-01-29 DIAGNOSIS — M43.16 ANTEROLISTHESIS OF LUMBAR SPINE: ICD-10-CM

## 2025-01-29 DIAGNOSIS — R29.898 WEAKNESS OF BOTH HIPS: ICD-10-CM

## 2025-01-29 DIAGNOSIS — M53.86 DECREASED RANGE OF MOTION OF LUMBAR SPINE: Primary | ICD-10-CM

## 2025-01-29 PROBLEM — M25.642 DECREASED RANGE OF MOTION OF LEFT THUMB: Status: RESOLVED | Noted: 2023-08-02 | Resolved: 2025-01-29

## 2025-01-29 PROBLEM — M79.645 PAIN OF LEFT THUMB: Status: RESOLVED | Noted: 2023-08-02 | Resolved: 2025-01-29

## 2025-01-29 PROCEDURE — 97161 PT EVAL LOW COMPLEX 20 MIN: CPT

## 2025-01-29 PROCEDURE — 97110 THERAPEUTIC EXERCISES: CPT

## 2025-02-04 ENCOUNTER — CLINICAL SUPPORT (OUTPATIENT)
Dept: REHABILITATION | Facility: HOSPITAL | Age: 65
End: 2025-02-04
Payer: MEDICARE

## 2025-02-04 DIAGNOSIS — M53.86 DECREASED RANGE OF MOTION OF LUMBAR SPINE: Primary | ICD-10-CM

## 2025-02-04 DIAGNOSIS — R29.898 WEAKNESS OF BOTH HIPS: ICD-10-CM

## 2025-02-04 PROCEDURE — 97140 MANUAL THERAPY 1/> REGIONS: CPT

## 2025-02-04 PROCEDURE — 97112 NEUROMUSCULAR REEDUCATION: CPT

## 2025-02-04 PROCEDURE — 97110 THERAPEUTIC EXERCISES: CPT

## 2025-02-04 NOTE — PROGRESS NOTES
"  Outpatient Rehab    Physical Therapy Visit    Patient Name: Luisa Padilla  MRN: [id number]  YOB: 1960  Today's Date: 2/4/2025    Therapy Diagnosis:   Encounter Diagnoses   Name Primary?    Decreased range of motion of lumbar spine Yes    Weakness of both hips      Physician: Emani Spann, CHERRI    Physician Orders: Eval and Treat  Medical Diagnosis: M43.16 (ICD-10-CM) - Anterolisthesis of lumbar spine      Visit # / Visits Authorized:  1 / 10  Date of Evaluation:  1/29/2025   Insurance Authorization Period: 1/29/2025 to 12/31/2025  Plan of Care Certification:  1/29/2025 to 4/23/2025     Time In: 0900   Time Out: 1000  Total Time: 60   Total Billable Time: 40 (PT 1 on 1 with patient for 40 minutes)         Subjective   Francisco she has been doing fine over the past week. She did Mauro yesterday and this did cause some back pain for a few hours. She has not done Pilates. She is not hurting much at this time..  Pain reported as 2/10.      Objective            Treatment:  Therapeutic Exercise  Therapeutic Exercise Activity 1: Seated thoracic extension over a chair, 3 x 8  Therapeutic Exercise Activity 2: Upright bike, 7 minutes level 3 resistance    Manual Therapy  Manual Therapy Activity 1: B hip long axis distraction, grade III and IV  Manual Therapy Activity 2: Prone atnerior hip mobs + extension, grade III  Manual Therapy Activity 3: Prone thoracic spine PA mobs, grade III    Balance/Neuromuscular Re-Education  Balance/Neuromuscular Re-Education Activity 1: Bridges, 3 x 8  Balance/Neuromuscular Re-Education Activity 2: SL clamshells with GTB, 2 x 10 each side  Balance/Neuromuscular Re-Education Activity 3: Posterior pelvic tilt with biofeedback cuff, 3 x 8 with 5" holds  Balance/Neuromuscular Re-Education Activity 4: Posterior pelvic tilt + bent knee fall out with biofeedback cuff, 2 x 10  Balance/Neuromuscular Re-Education Activity 5: Standing, bent over wedge on mat + hip extensions, 2 x 10 each " side  Balance/Neuromuscular Re-Education Activity 6: Paloff press with 10#, 2 x 10 each direction    Patient's spiritual, cultural, and educational needs considered and patient agreeable to plan of care and goals.     Assessment & Plan   Assessment: Luisa presents toat with minimal reports of symptoms at this time. Her lumbar spine AROM was pain free in all directions at this time. Her hip and thoracic spine mobility remain limited at this time so they were both addressed manually today. She was progressed with some hip loading activities to address her weakness. Core stabilization activities were progressed today and challenged her appropriately.     Education  Education was done with Patient. The patient's learning style includes Demonstration. The patient Demonstrates understanding.                 Plan: Continue to progress per POC.    Goals:   Active       Long term goals       Pt will improve FOTO score to </= 65% limited to decrease perceived limitation with maintaining/changing body position.  (Progressing)       Start:  01/29/25    Expected End:  04/23/25            Pt will perform bird dogs with good control to demonstrate improved core strength. (Progressing)       Start:  01/29/25    Expected End:  04/23/25            Pt will improve impaired LE strength by 1/2 MMT grade to improve strength for functional tasks. (Progressing)       Start:  01/29/25    Expected End:  04/23/25            Pt will report no pain during lumbar ROM to promote functional mobility. (Progressing)       Start:  01/29/25    Expected End:  04/23/25               Short term goals       Pt will be compliant with HEP to supplement PT in decreasing pain with functional mobility. (Progressing)       Start:  01/29/25    Expected End:  03/12/25            Pt will perform RDLs with good control to demonstrate improved core strength. (Progressing)       Start:  01/29/25    Expected End:  03/12/25            Pt will improve lumbar ROM to  </=minimal loss in all planes to improve functional mobility. (Progressing)       Start:  01/29/25    Expected End:  03/12/25            Pt will improve impaired LE strength by 1/2 MMT grade to improve strength for functional tasks. (Progressing)       Start:  01/29/25    Expected End:  03/12/25                MENG COTA PT

## 2025-02-05 ENCOUNTER — OFFICE VISIT (OUTPATIENT)
Dept: CARDIOLOGY | Facility: CLINIC | Age: 65
End: 2025-02-05
Payer: MEDICARE

## 2025-02-05 VITALS
BODY MASS INDEX: 26.41 KG/M2 | WEIGHT: 144.38 LBS | HEART RATE: 66 BPM | DIASTOLIC BLOOD PRESSURE: 82 MMHG | SYSTOLIC BLOOD PRESSURE: 135 MMHG

## 2025-02-05 DIAGNOSIS — I70.0 AORTIC ATHEROSCLEROSIS: Primary | ICD-10-CM

## 2025-02-05 DIAGNOSIS — Z82.49 FAMILY HISTORY OF HYPERTROPHIC CARDIOMYOPATHY: ICD-10-CM

## 2025-02-05 DIAGNOSIS — Z13.6 ENCOUNTER FOR SCREENING FOR CARDIOVASCULAR DISORDERS: ICD-10-CM

## 2025-02-05 PROCEDURE — 3075F SYST BP GE 130 - 139MM HG: CPT | Mod: CPTII,S$GLB,, | Performed by: INTERNAL MEDICINE

## 2025-02-05 PROCEDURE — 93000 ELECTROCARDIOGRAM COMPLETE: CPT | Mod: S$GLB,,, | Performed by: INTERNAL MEDICINE

## 2025-02-05 PROCEDURE — 3288F FALL RISK ASSESSMENT DOCD: CPT | Mod: CPTII,S$GLB,, | Performed by: INTERNAL MEDICINE

## 2025-02-05 PROCEDURE — 1126F AMNT PAIN NOTED NONE PRSNT: CPT | Mod: CPTII,S$GLB,, | Performed by: INTERNAL MEDICINE

## 2025-02-05 PROCEDURE — 1159F MED LIST DOCD IN RCRD: CPT | Mod: CPTII,S$GLB,, | Performed by: INTERNAL MEDICINE

## 2025-02-05 PROCEDURE — 99999 PR PBB SHADOW E&M-EST. PATIENT-LVL IV: CPT | Mod: PBBFAC,,, | Performed by: INTERNAL MEDICINE

## 2025-02-05 PROCEDURE — 3008F BODY MASS INDEX DOCD: CPT | Mod: CPTII,S$GLB,, | Performed by: INTERNAL MEDICINE

## 2025-02-05 PROCEDURE — 1101F PT FALLS ASSESS-DOCD LE1/YR: CPT | Mod: CPTII,S$GLB,, | Performed by: INTERNAL MEDICINE

## 2025-02-05 PROCEDURE — 3079F DIAST BP 80-89 MM HG: CPT | Mod: CPTII,S$GLB,, | Performed by: INTERNAL MEDICINE

## 2025-02-05 PROCEDURE — 99203 OFFICE O/P NEW LOW 30 MIN: CPT | Mod: 25,S$GLB,, | Performed by: INTERNAL MEDICINE

## 2025-02-05 NOTE — PROGRESS NOTES
Subjective:   02/05/2025     Patient ID:  Luisa Padilla is a 64 y.o. female who presents for evaulation of BRCA Mutation (Family history of hypertrophic cardiomyopathy)      Patient comes in with 2 major concerns.  Her mother has been diagnosed as having hypertrophic cardiomyopathy, she is in her 90s though.  The patient has no complaints of chest pains or tightness, PND, orthopnea.  She exercises regularly.  She is currently in rehab for back pain.    Family history is also positive for coronary artery disease, father with coronary artery disease and stents.    The 10-year ASCVD risk score (Mai MARIE, et al., 2019) is: 6.2%    Values used to calculate the score:      Age: 64 years      Sex: Female      Is Non- : No      Diabetic: No      Tobacco smoker: No      Systolic Blood Pressure: 135 mmHg      Is BP treated: No      HDL Cholesterol: 77 mg/dL      Total Cholesterol: 208 mg/dL        Past Medical History:   Diagnosis Date    BRCA gene mutation negative     BRCA1 negative     BRCA2 negative     Cataract     Epiretinal membrane (ERM) of left eye     Menopause     HOLLAND (obstructive sleep apnea)     mild, no need of CPAP    Seasonal allergies        Review of patient's allergies indicates:   Allergen Reactions    Latex, natural rubber Hives and Itching         Current Outpatient Medications:     AREXVY, PF, 120 mcg/0.5 mL SusR vaccine, , Disp: , Rfl:     biotin 1 mg tablet, Take 1,000 mcg by mouth 3 (three) times daily., Disp: , Rfl:     cholecalciferol, vitamin D3, (VITAMIN D3) 25 mcg (1,000 unit) capsule, Take 1 capsule (1,000 Units total) by mouth once daily., Disp: 30 capsule, Rfl: 11    cycloSPORINE (VEVYE) 0.1 % Drop, Place 1 drop into both eyes 2 (two) times a day., Disp: 2 mL, Rfl: 11    EScitalopram oxalate (LEXAPRO) 5 MG Tab, Take 1 tablet (5 mg total) by mouth once daily., Disp: 30 tablet, Rfl: 1    fexofenadine (ALLEGRA) 30 MG tablet, Take 30 mg by mouth once daily., Disp: ,  Rfl:     glucosamine-chondroitin 500-400 mg tablet, Take 1 tablet by mouth 3 (three) times daily., Disp: , Rfl:     meloxicam (MOBIC) 7.5 MG tablet, Take 1 tablet (7.5 mg total) by mouth once daily., Disp: 20 tablet, Rfl: 0    mometasone 0.1% (ELOCON) 0.1 % cream, , Disp: , Rfl:     PREVNAR 20, PF, 0.5 mL Syrg injection, , Disp: , Rfl:     psyllium 0.52 gram capsule, Take 0.52 g by mouth once daily., Disp: , Rfl:     traZODone (DESYREL) 50 MG tablet, Take 1 tablet by mouth in the evening, Disp: 90 tablet, Rfl: 0    vit C/E/zinc ox/lester/lut/zeax (ICAPS AREDS2 ORAL), Take by mouth., Disp: , Rfl:     Current Facility-Administered Medications:     sodium chloride 0.9% flush 10 mL, 10 mL, Intravenous, PRN, Luis Antonio Ray MD     Objective:   Review of Systems   Cardiovascular:  Negative for chest pain, claudication, cyanosis, dyspnea on exertion, irregular heartbeat, leg swelling, near-syncope, orthopnea, palpitations, paroxysmal nocturnal dyspnea and syncope.         Vitals:    02/05/25 0901   BP: 135/82   Pulse: 66     Wt Readings from Last 3 Encounters:   02/05/25 65.5 kg (144 lb 6.4 oz)   01/10/25 64.9 kg (143 lb 1.3 oz)   08/16/24 63 kg (138 lb 14.2 oz)     Temp Readings from Last 3 Encounters:   03/18/24 97.9 °F (36.6 °C) (Temporal)   02/26/24 97.7 °F (36.5 °C) (Temporal)   07/18/23 97.7 °F (36.5 °C) (Temporal)     BP Readings from Last 3 Encounters:   02/05/25 135/82   01/10/25 136/68   08/16/24 128/68     Pulse Readings from Last 3 Encounters:   02/05/25 66   01/10/25 78   08/16/24 62             Physical Exam  Vitals reviewed.   Constitutional:       General: She is not in acute distress.     Appearance: She is well-developed.   HENT:      Head: Normocephalic and atraumatic.      Nose: Nose normal.   Eyes:      Conjunctiva/sclera: Conjunctivae normal.      Pupils: Pupils are equal, round, and reactive to light.   Neck:      Vascular: No carotid bruit or JVD.   Cardiovascular:      Rate and Rhythm: Normal rate  and regular rhythm.      Pulses: Normal pulses and intact distal pulses.      Heart sounds: Normal heart sounds. No murmur heard.     No friction rub. No gallop.   Pulmonary:      Effort: Pulmonary effort is normal. No respiratory distress.      Breath sounds: Normal breath sounds. No wheezing or rales.   Chest:      Chest wall: No tenderness.   Abdominal:      General: Bowel sounds are normal. There is no distension.      Palpations: Abdomen is soft.      Tenderness: There is no abdominal tenderness.   Musculoskeletal:         General: No tenderness or deformity. Normal range of motion.      Cervical back: Normal range of motion and neck supple.      Right lower leg: No edema.      Left lower leg: No edema.   Skin:     General: Skin is warm and dry.      Findings: No erythema or rash.   Neurological:      Mental Status: She is alert and oriented to person, place, and time.      Cranial Nerves: No cranial nerve deficit.      Motor: No abnormal muscle tone.      Coordination: Coordination normal.   Psychiatric:         Behavior: Behavior normal.         Thought Content: Thought content normal.         Judgment: Judgment normal.           Lab Results   Component Value Date    CHOL 208 (H) 05/05/2023    CHOL 215 (H) 12/28/2021    CHOL 236 (H) 04/01/2021     Lab Results   Component Value Date    HDL 77 05/05/2023    HDL 73 12/28/2021    HDL 76 (H) 04/01/2021     Lab Results   Component Value Date    LDLCALC 114 (H) 05/05/2023    LDLCALC 121 12/28/2021    LDLCALC 131.0 04/01/2021     Lab Results   Component Value Date    ALT 34 08/14/2024    AST 25 08/14/2024    AST 38 12/04/2023    AST 29 05/05/2023     Lab Results   Component Value Date    CREATININE 0.8 08/14/2024    BUN 18 08/14/2024     08/14/2024    K 4.2 08/14/2024    CO2 25 08/14/2024    CO2 26 12/04/2023    CO2 28 05/05/2023     Lab Results   Component Value Date    HGB 12.5 08/14/2024    HCT 39.5 08/14/2024    HCT 42.4 12/04/2023    HCT 41.1 05/05/2023                  EKG shows sinus rhythm normal EKG        Assessment and Plan:     Aortic atherosclerosis    Family history of hypertrophic cardiomyopathy  Comments:  Echocardiogram now and every 5 years  Orders:  -     Ambulatory referral/consult to Cardiology  -     IN OFFICE EKG 12-LEAD (to [facility])  -     Echo; Future; Expected date: 02/12/2025    Encounter for screening for cardiovascular disorders  -     CT Cardiac Scoring; Future; Expected date: 02/05/2025         No follow-ups on file.          Future Appointments   Date Time Provider Department Center   2/7/2025 10:00 AM Daniele Mccall PT [facility]   2/11/2025  2:00 PM Daniele Mccall PT [facility]   2/14/2025  9:00 AM Daniele Mccall PT [facility]   2/18/2025 10:00 AM Daniele Mccall PT [facility]   2/21/2025 10:00 AM Daniele Mccall PT [facility]

## 2025-02-06 LAB
OHS QRS DURATION: 68 MS
OHS QTC CALCULATION: 410 MS

## 2025-02-06 NOTE — PROGRESS NOTES
"  Outpatient Rehab    Physical Therapy Visit    Patient Name: Luisa Padilla  MRN: [id number]  YOB: 1960  Today's Date: 2/7/2025    Therapy Diagnosis:   Encounter Diagnoses   Name Primary?    Decreased range of motion of lumbar spine Yes    Weakness of both hips        Physician: Emani Spann, NP    Physician Orders: Eval and Treat  Medical Diagnosis: M43.16 (ICD-10-CM) - Anterolisthesis of lumbar spine      Visit # / Visits Authorized:  2 / 10  Date of Evaluation:  1/29/2025   Insurance Authorization Period: 1/29/2025 to 12/31/2025  Plan of Care Certification:  1/29/2025 to 4/23/2025     Time In: 1002   Time Out: 1100  Total Time: 58   Total Billable Time: 30 (PT 1 on 1 with patient for 30 minutes)         Subjective   That her back really has been doing well the past few days. Unfortunately she suffered a fall earlier today when she tripped on a dog gate. She feels like she hurt her elbow and knee a bit but overall is feeling ok and does want to do PT today. She did a Pilates class a few days ago and it went well..  Pain reported as 2/10.      Objective            Treatment:  Therapeutic Exercise  Therapeutic Exercise Activity 1: Seated thoracic extension over a chair, 3 x 8  Therapeutic Exercise Activity 2: Upright bike, 7 minutes level 3 resistance  Therapeutic Exercise Activity 3: DL shuttle leg press, 62#, 3 x 10    Manual Therapy  Manual Therapy Activity 1: B hip long axis distraction, grade III and IV  Manual Therapy Activity 2: Prone atnerior hip mobs + extension, grade III  Manual Therapy Activity 3: Prone thoracic spine PA mobs, grade III    Balance/Neuromuscular Re-Education  Balance/Neuromuscular Re-Education Activity 1: Bridges with GTB, 3 x 8  Balance/Neuromuscular Re-Education Activity 2: SL clamshells with GTB, 2 x 10 each side  Balance/Neuromuscular Re-Education Activity 3: Posterior pelvic tilt with biofeedback cuff, 3 x 8 with 5" holds  Balance/Neuromuscular Re-Education " Activity 4: Posterior pelvic tilt + bent knee fall out with biofeedback cuff, 2 x 10  Balance/Neuromuscular Re-Education Activity 5: Standing, bent over wedge on mat + hip extensions, 2 x 10 each side  Balance/Neuromuscular Re-Education Activity 6: Paloff press with 10#, 2 x 10 each direction    Patient's spiritual, cultural, and educational needs considered and patient agreeable to plan of care and goals.     Assessment & Plan   Assessment: Luisa presents following a fall earlier today. She is able to safely bear weight without pain and there is no sharp increases in pain so she was deemed safe for her session today. Her lumbar spine ROM was pain free. Her hip and thoracic spine mobility continue to be addressed to help offload her lumbar spine. She continues to be challenged with core stabilization and hip strengthening with good tolerance.             Plan:      Goals:   Active       Long term goals       Pt will improve FOTO score to </= 65% limited to decrease perceived limitation with maintaining/changing body position.  (Progressing)       Start:  01/29/25    Expected End:  04/23/25            Pt will perform bird dogs with good control to demonstrate improved core strength. (Progressing)       Start:  01/29/25    Expected End:  04/23/25            Pt will improve impaired LE strength by 1/2 MMT grade to improve strength for functional tasks. (Progressing)       Start:  01/29/25    Expected End:  04/23/25            Pt will report no pain during lumbar ROM to promote functional mobility. (Progressing)       Start:  01/29/25    Expected End:  04/23/25               Short term goals       Pt will be compliant with HEP to supplement PT in decreasing pain with functional mobility. (Progressing)       Start:  01/29/25    Expected End:  03/12/25            Pt will perform RDLs with good control to demonstrate improved core strength. (Progressing)       Start:  01/29/25    Expected End:  03/12/25            Pt will  improve lumbar ROM to </=minimal loss in all planes to improve functional mobility. (Progressing)       Start:  01/29/25    Expected End:  03/12/25            Pt will improve impaired LE strength by 1/2 MMT grade to improve strength for functional tasks. (Progressing)       Start:  01/29/25    Expected End:  03/12/25                MENG COTA, PT

## 2025-02-07 ENCOUNTER — CLINICAL SUPPORT (OUTPATIENT)
Dept: REHABILITATION | Facility: HOSPITAL | Age: 65
End: 2025-02-07
Payer: MEDICARE

## 2025-02-07 DIAGNOSIS — M53.86 DECREASED RANGE OF MOTION OF LUMBAR SPINE: Primary | ICD-10-CM

## 2025-02-07 DIAGNOSIS — R29.898 WEAKNESS OF BOTH HIPS: ICD-10-CM

## 2025-02-07 PROCEDURE — 97112 NEUROMUSCULAR REEDUCATION: CPT

## 2025-02-07 PROCEDURE — 97110 THERAPEUTIC EXERCISES: CPT

## 2025-02-10 ENCOUNTER — HOSPITAL ENCOUNTER (OUTPATIENT)
Dept: RADIOLOGY | Facility: HOSPITAL | Age: 65
Discharge: HOME | End: 2025-02-10
Attending: INTERNAL MEDICINE
Payer: MEDICARE

## 2025-02-10 ENCOUNTER — HOSPITAL ENCOUNTER (OUTPATIENT)
Dept: CARDIOLOGY | Facility: HOSPITAL | Age: 65
Discharge: HOME | End: 2025-02-10
Attending: INTERNAL MEDICINE
Payer: MEDICARE

## 2025-02-10 VITALS
SYSTOLIC BLOOD PRESSURE: 142 MMHG | WEIGHT: 144 LBS | HEIGHT: 62 IN | DIASTOLIC BLOOD PRESSURE: 80 MMHG | BODY MASS INDEX: 26.5 KG/M2 | HEART RATE: 65 BPM

## 2025-02-10 DIAGNOSIS — Z13.6 ENCOUNTER FOR SCREENING FOR CARDIOVASCULAR DISORDERS: ICD-10-CM

## 2025-02-10 DIAGNOSIS — Z82.49 FAMILY HISTORY OF HYPERTROPHIC CARDIOMYOPATHY: ICD-10-CM

## 2025-02-10 LAB
ASCENDING AORTA: 2.66 CM
AV AREA BY CONTINUOUS VTI: 1.9 CM2
AV INDEX (PROSTH): 0.68
AV LVOT MEAN GRADIENT: 2 MMHG
AV LVOT PEAK GRADIENT: 4 MMHG
AV MEAN GRADIENT: 6 MMHG
AV PEAK GRADIENT: 13 MMHG
AV VALVE AREA BY VELOCITY RATIO: 1.6 CM²
AV VALVE AREA: 1.9 CM2
AV VELOCITY RATIO: 0.56
BSA FOR ECHO PROCEDURE: 1.69 M2
CV ECHO LV RWT: 0.3 CM
DOP CALC AO PEAK VEL: 1.8 M/S
DOP CALC AO VTI: 43.1 CM
DOP CALC LVOT AREA: 2.8 CM2
DOP CALC LVOT DIAMETER: 1.9 CM
DOP CALC LVOT PEAK VEL: 1 M/S
DOP CALC LVOT STROKE VOLUME: 83 CM3
DOP CALC RVOT AREA: 2.43 CM2
DOP CALC RVOT DIAMETER: 1.76 CM
DOP CALCLVOT PEAK VEL VTI: 29.3 CM
E WAVE DECELERATION TIME: 180 MS
E/A RATIO: 0.78
E/E' RATIO: 8 M/S
ECHO EF ESTIMATED: 66 %
ECHO LV POSTERIOR WALL: 0.7 CM (ref 0.6–1.1)
FRACTIONAL SHORTENING: 36.2 % (ref 28–44)
HR MV ECHO: 65 BPM
INTERVENTRICULAR SEPTUM: 0.7 CM (ref 0.6–1.1)
IVC DIAMETER: 1.5 CM
LA MAJOR: 4.4 CM
LA MINOR: 4 CM
LA WIDTH: 3.6 CM
LEFT ATRIUM SIZE: 3 CM
LEFT ATRIUM VOLUME INDEX MOD: 22 ML/M2
LEFT ATRIUM VOLUME INDEX: 23 ML/M2
LEFT ATRIUM VOLUME MOD: 36 ML
LEFT ATRIUM VOLUME: 38 CM3
LEFT INTERNAL DIMENSION IN SYSTOLE: 3 CM (ref 2.1–4)
LEFT VENTRICLE DIASTOLIC VOLUME INDEX: 61.43 ML/M2
LEFT VENTRICLE DIASTOLIC VOLUME: 101.97 ML
LEFT VENTRICLE MASS INDEX: 62.1 G/M2
LEFT VENTRICLE SYSTOLIC VOLUME INDEX: 20.7 ML/M2
LEFT VENTRICLE SYSTOLIC VOLUME: 34.3 ML
LEFT VENTRICULAR INTERNAL DIMENSION IN DIASTOLE: 4.7 CM (ref 3.5–6)
LEFT VENTRICULAR MASS: 103.1 G
LV LATERAL E/E' RATIO: 11.4
LV SEPTAL E/E' RATIO: 6.7
MV A" WAVE DURATION": 142.72 MS
MV MEAN GRADIENT: 2 MMHG
MV PEAK A VEL: 1.02 M/S
MV PEAK E VEL: 0.8 M/S
OHS CV RV/LV RATIO: 0.55 CM
PISA TR MAX VEL: 2.6 M/S
PULM VEIN A" WAVE DURATION": 142.72 MS
PULM VEIN S/D RATIO: 1.09
PULMONIC VEIN PEAK A VELOCITY: 0.3 M/S
PV PEAK D VEL: 0.45 M/S
PV PEAK S VEL: 0.49 M/S
RA MAJOR: 4.58 CM
RA PRESSURE ESTIMATED: 3 MMHG
RA WIDTH: 2.91 CM
RIGHT ATRIAL AREA: 12.2 CM2
RIGHT VENTRICLE DIASTOLIC BASEL DIMENSION: 2.6 CM
RV TB RVSP: 6 MMHG
RV TISSUE DOPPLER FREE WALL SYSTOLIC VELOCITY 1 (APICAL 4 CHAMBER VIEW): 14.71 CM/S
SINUS: 2.49 CM
STJ: 2.7 CM
TDI LATERAL: 0.07 M/S
TDI SEPTAL: 0.12 M/S
TDI: 0.1 M/S
TR MAX PG: 27 MMHG
TRICUSPID ANNULAR PLANE SYSTOLIC EXCURSION: 2.05 CM
TV PEAK GRADIENT: 26 MMHG
TV REST PULMONARY ARTERY PRESSURE: 30 MMHG
Z-SCORE OF LEFT VENTRICULAR DIMENSION IN END DIASTOLE: 0.11
Z-SCORE OF LEFT VENTRICULAR DIMENSION IN END SYSTOLE: 0.32

## 2025-02-10 PROCEDURE — 93306 TTE W/DOPPLER COMPLETE: CPT | Mod: 26,,, | Performed by: INTERNAL MEDICINE

## 2025-02-10 PROCEDURE — 93306 TTE W/DOPPLER COMPLETE: CPT

## 2025-02-10 PROCEDURE — 75571 CT HRT W/O DYE W/CA TEST: CPT | Mod: TC

## 2025-02-10 PROCEDURE — 75571 CT HRT W/O DYE W/CA TEST: CPT | Mod: 26,,, | Performed by: STUDENT IN AN ORGANIZED HEALTH CARE EDUCATION/TRAINING PROGRAM

## 2025-02-11 ENCOUNTER — CLINICAL SUPPORT (OUTPATIENT)
Dept: REHABILITATION | Facility: HOSPITAL | Age: 65
End: 2025-02-11
Payer: MEDICARE

## 2025-02-11 DIAGNOSIS — M53.86 DECREASED RANGE OF MOTION OF LUMBAR SPINE: Primary | ICD-10-CM

## 2025-02-11 DIAGNOSIS — R29.898 WEAKNESS OF BOTH HIPS: ICD-10-CM

## 2025-02-11 PROCEDURE — 97140 MANUAL THERAPY 1/> REGIONS: CPT

## 2025-02-11 PROCEDURE — 97112 NEUROMUSCULAR REEDUCATION: CPT

## 2025-02-11 PROCEDURE — 97110 THERAPEUTIC EXERCISES: CPT

## 2025-02-11 NOTE — PROGRESS NOTES
"  Outpatient Rehab    Physical Therapy Visit    Patient Name: Luisa Padilla  MRN: [id number]  YOB: 1960  Today's Date: 2/11/2025    Therapy Diagnosis:   Encounter Diagnoses   Name Primary?    Decreased range of motion of lumbar spine Yes    Weakness of both hips      Physician: Emani Spann, NP    Physician Orders: Eval and Treat  Medical Diagnosis: M43.16 (ICD-10-CM) - Anterolisthesis of lumbar spine      Visit # / Visits Authorized:  3 / 10  Date of Evaluation:  1/29/2025   Insurance Authorization Period: 1/29/2025 to 12/31/2025  Plan of Care Certification:  1/29/2025 to 4/23/2025     Time In: 0157   Time Out: 0255  Total Time: 58   Total Billable Time: 55 (PT 1 on 1 with patient for 55 minutes)         Subjective   That she is still having sore soreness in her R shoulder and knee following her fall last week but her pain really is not too bad. Her back is a bit stiff today but is is not "going out" anymore. She did Mauro yesterday but did not do the activities that required her to jump..  Pain reported as 2/10.      Objective            Treatment:  Therapeutic Exercise  Therapeutic Exercise Activity 1: Seated thoracic extension over a chair, 3 x 8  Therapeutic Exercise Activity 2: Upright bike, 7 minutes level 3 resistance  Therapeutic Exercise Activity 3: DL shuttle leg press, 62#, 3 x 10    Manual Therapy  Manual Therapy Activity 1: B hip long axis distraction, grade III and IV  Manual Therapy Activity 2: Prone atnerior hip mobs + extension, grade III  Manual Therapy Activity 3: Prone thoracic spine PA mobs, grade III    Balance/Neuromuscular Re-Education  Balance/Neuromuscular Re-Education Activity 1: Bridges with GTB, 3 x 8  Balance/Neuromuscular Re-Education Activity 2: SL clamshells with GTB, 2 x 10 each side  Balance/Neuromuscular Re-Education Activity 3: Posterior pelvic tilt with biofeedback cuff, 3 x 8 with 5" holds  Balance/Neuromuscular Re-Education Activity 4: Posterior pelvic " tilt + bent knee fall out with GTB with biofeedback cuff, 2 x 10  Balance/Neuromuscular Re-Education Activity 5: Standing, bent over wedge on mat + hip extensions with 3#, 2 x 10 each side  Balance/Neuromuscular Re-Education Activity 6: Paloff press with 10#, 2 x 10 each direction  Balance/Neuromuscular Re-Education Activity 7: Lateral walking with RTB, 10 yards x3 laps    Patient's spiritual, cultural, and educational needs considered and patient agreeable to plan of care and goals.     Assessment & Plan   Assessment: Luisa presents today with repoorts of lower lumabr stiffness. Her lumbar ROM was pain free in all directions today. Her hip and thoracic spine mobility continue to be addressed manually to help offload the lumbar spine. She was progressed with resistance during LE strengthening today with good tolerance.  Evaluation/Treatment Tolerance: Patient tolerated treatment well          Plan: Continue to progress per POC.    Goals:   Active       Long term goals       Pt will improve FOTO score to </= 65% limited to decrease perceived limitation with maintaining/changing body position.  (Progressing)       Start:  01/29/25    Expected End:  04/23/25            Pt will perform bird dogs with good control to demonstrate improved core strength. (Progressing)       Start:  01/29/25    Expected End:  04/23/25            Pt will improve impaired LE strength by 1/2 MMT grade to improve strength for functional tasks. (Progressing)       Start:  01/29/25    Expected End:  04/23/25            Pt will report no pain during lumbar ROM to promote functional mobility. (Progressing)       Start:  01/29/25    Expected End:  04/23/25               Short term goals       Pt will be compliant with HEP to supplement PT in decreasing pain with functional mobility. (Progressing)       Start:  01/29/25    Expected End:  03/12/25            Pt will perform RDLs with good control to demonstrate improved core strength. (Progressing)        Start:  01/29/25    Expected End:  03/12/25            Pt will improve lumbar ROM to </=minimal loss in all planes to improve functional mobility. (Progressing)       Start:  01/29/25    Expected End:  03/12/25            Pt will improve impaired LE strength by 1/2 MMT grade to improve strength for functional tasks. (Progressing)       Start:  01/29/25    Expected End:  03/12/25                MENG COTA, PT

## 2025-02-14 ENCOUNTER — CLINICAL SUPPORT (OUTPATIENT)
Dept: REHABILITATION | Facility: HOSPITAL | Age: 65
End: 2025-02-14
Payer: MEDICARE

## 2025-02-14 DIAGNOSIS — M53.86 DECREASED RANGE OF MOTION OF LUMBAR SPINE: Primary | ICD-10-CM

## 2025-02-14 DIAGNOSIS — R29.898 WEAKNESS OF BOTH HIPS: ICD-10-CM

## 2025-02-14 PROCEDURE — 97112 NEUROMUSCULAR REEDUCATION: CPT

## 2025-02-14 NOTE — PROGRESS NOTES
"  Outpatient Rehab    Physical Therapy Visit    Patient Name: Luisa Padilla  MRN: [id number]  YOB: 1960  Today's Date: 2/14/2025    Therapy Diagnosis:   Encounter Diagnoses   Name Primary?    Decreased range of motion of lumbar spine Yes    Weakness of both hips        Physician: Emani Spann, NP    Physician Orders: Eval and Treat  Medical Diagnosis: M43.16 (ICD-10-CM) - Anterolisthesis of lumbar spine      Visit # / Visits Authorized:  4 / 10  Date of Evaluation:  1/29/2025   Insurance Authorization Period: 1/29/2025 to 12/31/2025  Plan of Care Certification:  1/29/2025 to 4/23/2025     Time In: 0900   Time Out: 1000  Total Time: 60   Total Billable Time: 30 (PT 1 on 1 with patient for 30 minutes)         Subjective   That she really is not having any pain at this time just back weakness. This weakness is very bothersome for her and she feels like it affects her ADLs and exercise classes..  Pain reported as 0/10.      Objective            Treatment:  Therapeutic Exercise  Therapeutic Exercise Activity 1: Seated thoracic extension over a chair, 3 x 8  Therapeutic Exercise Activity 2: Upright bike, 7 minutes level 3 resistance  Therapeutic Exercise Activity 3: DL shuttle leg press, 62#, 3 x 10         Balance/Neuromuscular Re-Education  Balance/Neuromuscular Re-Education Activity 1: Bridges with BTB, 3 x 8  Balance/Neuromuscular Re-Education Activity 2: SL clamshells with BTB, 2 x 10 each side  Balance/Neuromuscular Re-Education Activity 3: Posterior pelvic tilt with biofeedback cuff, 3 x 8 with 5" holds  Balance/Neuromuscular Re-Education Activity 4: Posterior pelvic tilt + bent knee fall out with GTB with biofeedback cuff, 2 x 10  Balance/Neuromuscular Re-Education Activity 5: Standing, bent over wedge on mat + hip extensions with 4#, 2 x 10 each side  Balance/Neuromuscular Re-Education Activity 6: Paloff press with 10#, 2 x 10 each direction  Balance/Neuromuscular Re-Education Activity 7: " Lateral walking with RTB, 10 yards x3 laps  Balance/Neuromuscular Re-Education Activity 8: Hip hinges with dowel dianne, 3 x 8  Balance/Neuromuscular Re-Education Activity 9: Bird dogs, 3 x 8 each side    Patient's spiritual, cultural, and educational needs considered and patient agreeable to plan of care and goals.     Assessment & Plan   Assessment: Luisa presents with no reports of pain today, only stiffness in her lower lumbar spine. Her lumabr AROM was pain free and her hip ROM was unrestricted today so manual interventions were not performed. She was progressed with core stabilization and motor control exercsies today with good tolerance.  Evaluation/Treatment Tolerance: Patient tolerated treatment well          Plan: Continue to progress per POC.    Goals:   Active       Long term goals       Pt will improve FOTO score to </= 65% limited to decrease perceived limitation with maintaining/changing body position.  (Progressing)       Start:  01/29/25    Expected End:  04/23/25            Pt will perform bird dogs with good control to demonstrate improved core strength. (Progressing)       Start:  01/29/25    Expected End:  04/23/25            Pt will improve impaired LE strength by 1/2 MMT grade to improve strength for functional tasks. (Progressing)       Start:  01/29/25    Expected End:  04/23/25            Pt will report no pain during lumbar ROM to promote functional mobility. (Progressing)       Start:  01/29/25    Expected End:  04/23/25               Short term goals       Pt will be compliant with HEP to supplement PT in decreasing pain with functional mobility. (Progressing)       Start:  01/29/25    Expected End:  03/12/25            Pt will perform RDLs with good control to demonstrate improved core strength. (Progressing)       Start:  01/29/25    Expected End:  03/12/25            Pt will improve lumbar ROM to </=minimal loss in all planes to improve functional mobility. (Progressing)       Start:   01/29/25    Expected End:  03/12/25            Pt will improve impaired LE strength by 1/2 MMT grade to improve strength for functional tasks. (Progressing)       Start:  01/29/25    Expected End:  03/12/25                MENG COTA, PT

## 2025-02-18 ENCOUNTER — CLINICAL SUPPORT (OUTPATIENT)
Dept: REHABILITATION | Facility: HOSPITAL | Age: 65
End: 2025-02-18
Payer: MEDICARE

## 2025-02-18 DIAGNOSIS — R29.898 WEAKNESS OF BOTH HIPS: ICD-10-CM

## 2025-02-18 DIAGNOSIS — M53.86 DECREASED RANGE OF MOTION OF LUMBAR SPINE: Primary | ICD-10-CM

## 2025-02-18 PROCEDURE — 97112 NEUROMUSCULAR REEDUCATION: CPT

## 2025-02-18 PROCEDURE — 97110 THERAPEUTIC EXERCISES: CPT

## 2025-02-18 PROCEDURE — 97140 MANUAL THERAPY 1/> REGIONS: CPT

## 2025-02-18 NOTE — PROGRESS NOTES
Outpatient Rehab    Physical Therapy Visit    Patient Name: Luisa Padilla  MRN: [id number]  YOB: 1960  Today's Date: 2/18/2025    Therapy Diagnosis:   Encounter Diagnoses   Name Primary?    Decreased range of motion of lumbar spine Yes    Weakness of both hips      Physician: Emani Spann, NP    Physician Orders: Eval and Treat  Medical Diagnosis: M43.16 (ICD-10-CM) - Anterolisthesis of lumbar spine      Visit # / Visits Authorized:  5 / 10  Date of Evaluation:  1/29/2025   Insurance Authorization Period: 1/29/2025 to 12/31/2025  Plan of Care Certification:  1/29/2025 to 4/23/2025     Time In: 1002   Time Out: 1100  Total Time: 58   Total Billable Time: 40 (PT 1 on 1 with patient for 40 minutes)         Subjective   Patient reports that the low back still is not hurting, only has weakness. She is getting some pain and stiffness in her upp back and feels like she slept wrong the last few nights..  Pain reported as 2/10.      Objective            Treatment:  Therapeutic Exercise  Therapeutic Exercise Activity 1: Seated thoracic extension over a chair, 3 x 8  Therapeutic Exercise Activity 2: Upright bike, 7 minutes level 3 resistance  Therapeutic Exercise Activity 3: DL shuttle leg press, 62#, 3 x 10  Therapeutic Exercise Activity 4: Chicken wings, 2 x 10  Therapeutic Exercise Activity 5: SL open books, 15x each side    Manual Therapy  Manual Therapy Activity 1: B hip long axis distraction, grade III and IV  Manual Therapy Activity 3: Prone thoracic spine and CT junction PA mobs, grade III    Balance/Neuromuscular Re-Education  Balance/Neuromuscular Re-Education Activity 1: Bridges with BTB, 3 x 8  Balance/Neuromuscular Re-Education Activity 2: SL clamshells with BTB, 2 x 10 each side  Balance/Neuromuscular Re-Education Activity 3: Posterior pelvic tilt with biofeedback cuff + marches, 2 x 10  Balance/Neuromuscular Re-Education Activity 4: Posterior pelvic tilt + bent knee fall out with GTB with  biofeedback cuff, 2 x 10  Balance/Neuromuscular Re-Education Activity 6: Paloff press with 10#, 2 x 10 each direction  Balance/Neuromuscular Re-Education Activity 7: Lateral walking with RTB, 10 yards x3 laps  Balance/Neuromuscular Re-Education Activity 8: Hip hinges with dowel dianne, 3 x 8    Patient's spiritual, cultural, and educational needs considered and patient agreeable to plan of care and goals.     Assessment & Plan   Assessment: Luisa is reporting increased thoracic spine today today. Mobility assessment reveals excessive stiffness throughout her thoracic spine that was addressed manually today. She continues to be challenged with transverse abdominis control activities with good tolerance. Hip strengthening continues to challenge her appropriately at this time.  Evaluation/Treatment Tolerance: Patient tolerated treatment well          Plan: Continue to progress per POC.    Goals:   Active       Long term goals       Pt will improve FOTO score to </= 65% limited to decrease perceived limitation with maintaining/changing body position.  (Progressing)       Start:  01/29/25    Expected End:  04/23/25            Pt will perform bird dogs with good control to demonstrate improved core strength. (Progressing)       Start:  01/29/25    Expected End:  04/23/25            Pt will improve impaired LE strength by 1/2 MMT grade to improve strength for functional tasks. (Progressing)       Start:  01/29/25    Expected End:  04/23/25            Pt will report no pain during lumbar ROM to promote functional mobility. (Progressing)       Start:  01/29/25    Expected End:  04/23/25               Short term goals       Pt will be compliant with HEP to supplement PT in decreasing pain with functional mobility. (Progressing)       Start:  01/29/25    Expected End:  03/12/25            Pt will perform RDLs with good control to demonstrate improved core strength. (Progressing)       Start:  01/29/25    Expected End:  03/12/25             Pt will improve lumbar ROM to </=minimal loss in all planes to improve functional mobility. (Progressing)       Start:  01/29/25    Expected End:  03/12/25            Pt will improve impaired LE strength by 1/2 MMT grade to improve strength for functional tasks. (Progressing)       Start:  01/29/25    Expected End:  03/12/25                MENG COTA PT

## 2025-02-21 ENCOUNTER — PATIENT MESSAGE (OUTPATIENT)
Dept: OPTOMETRY | Facility: CLINIC | Age: 65
End: 2025-02-21

## 2025-02-21 ENCOUNTER — OFFICE VISIT (OUTPATIENT)
Dept: OPTOMETRY | Facility: CLINIC | Age: 65
End: 2025-02-21
Payer: MEDICARE

## 2025-02-21 ENCOUNTER — PATIENT MESSAGE (OUTPATIENT)
Dept: REHABILITATION | Facility: HOSPITAL | Age: 65
End: 2025-02-21
Payer: MEDICARE

## 2025-02-21 DIAGNOSIS — H35.372 EPIRETINAL MEMBRANE (ERM) OF LEFT EYE: ICD-10-CM

## 2025-02-21 DIAGNOSIS — Z96.1 PSEUDOPHAKIA OF BOTH EYES: ICD-10-CM

## 2025-02-21 DIAGNOSIS — H26.492 PCO (POSTERIOR CAPSULAR OPACIFICATION), LEFT: Primary | ICD-10-CM

## 2025-02-21 DIAGNOSIS — H04.123 DRY EYE SYNDROME, BILATERAL: ICD-10-CM

## 2025-02-21 NOTE — PROGRESS NOTES
HPI    Here for blurry vision    Eye meds: Lubricating drops PRN     64 year old female states over the last 3 weeks her vision has become   blurry OS. States she noticed a film over the right eye. Denies flashers,   floaters or diplopia. C/o of dry eyes. Hx of PCIOL OD 03/18/2024  Last edited by Natasha Jones on 2/21/2025 11:15 AM.            Assessment /Plan     For exam results, see Encounter Report.    PCO (posterior capsular opacification), left   Consult for YAG OS>>OD    Pseudophakia of both eyes   Stable, monitor    Epiretinal membrane (ERM) of left eye    ERM OS w/ decreased Va and MMP      S/p  25g PPV/ILM peel/AFx OS for ERM 12/9/20     Dry eye syndrome, bilateral  Start    lifitegrast (XIIDRA) 5 % Dpet; Place 1 drop into both eyes every 12 (twelve) hours.  Dispense: 60 each; Refill: 11   And IVIZIA BID OU   Consider plugs/ VEVYE in the future    RTC 3 mo for annual/ OCT mac

## 2025-02-26 ENCOUNTER — PATIENT MESSAGE (OUTPATIENT)
Dept: REHABILITATION | Facility: HOSPITAL | Age: 65
End: 2025-02-26
Payer: MEDICARE

## 2025-02-28 ENCOUNTER — CLINICAL SUPPORT (OUTPATIENT)
Dept: REHABILITATION | Facility: HOSPITAL | Age: 65
End: 2025-02-28
Payer: MEDICARE

## 2025-02-28 DIAGNOSIS — M53.86 DECREASED RANGE OF MOTION OF LUMBAR SPINE: Primary | ICD-10-CM

## 2025-02-28 DIAGNOSIS — R29.898 WEAKNESS OF BOTH HIPS: ICD-10-CM

## 2025-02-28 PROCEDURE — 97112 NEUROMUSCULAR REEDUCATION: CPT

## 2025-02-28 PROCEDURE — 97110 THERAPEUTIC EXERCISES: CPT

## 2025-02-28 PROCEDURE — 97140 MANUAL THERAPY 1/> REGIONS: CPT

## 2025-02-28 NOTE — PROGRESS NOTES
"  Outpatient Rehab    Physical Therapy Visit    Patient Name: Luisa Padilla  MRN: [id number]  YOB: 1960  Today's Date: 2/28/2025    Therapy Diagnosis:   Encounter Diagnoses   Name Primary?    Decreased range of motion of lumbar spine Yes    Weakness of both hips      Physician: Emani Spann, NP    Physician Orders: Eval and Treat  Medical Diagnosis: M43.16 (ICD-10-CM) - Anterolisthesis of lumbar spine      Visit # / Visits Authorized:  6 / 10  Date of Evaluation:  1/29/2025   Insurance Authorization Period: 1/29/2025 to 12/31/2025  Plan of Care Certification:  1/29/2025 to 4/23/2025     Time In: 0900   Time Out: 1000  Total Time: 60   Total Billable Time: 55 (PT 1 on 1 with patient for 55 minutes)         Subjective   That her low back has been doing really well but her upper back has been hurting her and is very stiff..  Pain reported as 2/10.      Objective            Treatment:  Therapeutic Exercise  Therapeutic Exercise Activity 1: Seated thoracic extension over a chair, 3 x 8  Therapeutic Exercise Activity 2: SNAGs with towel to the R, 2 x 10  Therapeutic Exercise Activity 4: Chicken wings, 2 x 10  Therapeutic Exercise Activity 5: SL open books, 15x each side    Manual Therapy  Manual Therapy Activity 1: R first rib mobs, grade IV  Manual Therapy Activity 2: MET to improve upper cerivcal rotation to the R, 4 x 5" holds  Manual Therapy Activity 3: Prone thoracic spine and CT junction PA mobs, grade III    Balance/Neuromuscular Re-Education  Balance/Neuromuscular Re-Education Activity 1: Bridges with BTB, 3 x 8  Balance/Neuromuscular Re-Education Activity 2: SL clamshells with BTB, 2 x 10 each side  Balance/Neuromuscular Re-Education Activity 5: Sit to stands with dowel dianne, 2 x 10  Balance/Neuromuscular Re-Education Activity 6: Paloff press with 10#, 2 x 10 each direction  Balance/Neuromuscular Re-Education Activity 7: Lateral walking with RTB, 10 yards x3 laps  Balance/Neuromuscular " Re-Education Activity 8: Hip hinges with dowel dianne, 3 x 8    Patient's spiritual, cultural, and educational needs considered and patient agreeable to plan of care and goals.     Assessment & Plan   Assessment: Luisa presents to PT today with increased reports of mid thoracic spine pain. She also had pain with cervical AROM so these areas were addressed manually today. Her ROM was pain free after manual interventions. She continues to be challenged with hip and core strengthening at this time.  Evaluation/Treatment Tolerance: Patient tolerated treatment well          Plan: Continue to progress per POC.    Goals:   Active       Long term goals       Pt will improve FOTO score to </= 65% limited to decrease perceived limitation with maintaining/changing body position.  (Progressing)       Start:  01/29/25    Expected End:  04/23/25            Pt will perform bird dogs with good control to demonstrate improved core strength. (Progressing)       Start:  01/29/25    Expected End:  04/23/25            Pt will improve impaired LE strength by 1/2 MMT grade to improve strength for functional tasks. (Progressing)       Start:  01/29/25    Expected End:  04/23/25            Pt will report no pain during lumbar ROM to promote functional mobility. (Progressing)       Start:  01/29/25    Expected End:  04/23/25               Short term goals       Pt will be compliant with HEP to supplement PT in decreasing pain with functional mobility. (Progressing)       Start:  01/29/25    Expected End:  03/12/25            Pt will perform RDLs with good control to demonstrate improved core strength. (Progressing)       Start:  01/29/25    Expected End:  03/12/25            Pt will improve lumbar ROM to </=minimal loss in all planes to improve functional mobility. (Progressing)       Start:  01/29/25    Expected End:  03/12/25            Pt will improve impaired LE strength by 1/2 MMT grade to improve strength for functional tasks.  (Progressing)       Start:  01/29/25    Expected End:  03/12/25                MENG COTA PT

## 2025-03-06 ENCOUNTER — CLINICAL SUPPORT (OUTPATIENT)
Dept: REHABILITATION | Facility: HOSPITAL | Age: 65
End: 2025-03-06
Payer: MEDICARE

## 2025-03-06 DIAGNOSIS — R29.898 WEAKNESS OF BOTH HIPS: ICD-10-CM

## 2025-03-06 DIAGNOSIS — M53.86 DECREASED RANGE OF MOTION OF LUMBAR SPINE: Primary | ICD-10-CM

## 2025-03-06 PROCEDURE — 97110 THERAPEUTIC EXERCISES: CPT

## 2025-03-06 PROCEDURE — 97112 NEUROMUSCULAR REEDUCATION: CPT

## 2025-03-14 ENCOUNTER — PATIENT MESSAGE (OUTPATIENT)
Dept: OPTOMETRY | Facility: CLINIC | Age: 65
End: 2025-03-14
Payer: MEDICARE

## 2025-03-18 ENCOUNTER — CLINICAL SUPPORT (OUTPATIENT)
Dept: REHABILITATION | Facility: HOSPITAL | Age: 65
End: 2025-03-18
Payer: MEDICARE

## 2025-03-18 DIAGNOSIS — M53.86 DECREASED RANGE OF MOTION OF LUMBAR SPINE: Primary | ICD-10-CM

## 2025-03-18 DIAGNOSIS — R29.898 WEAKNESS OF BOTH HIPS: ICD-10-CM

## 2025-03-18 PROCEDURE — 97112 NEUROMUSCULAR REEDUCATION: CPT

## 2025-03-18 PROCEDURE — 97110 THERAPEUTIC EXERCISES: CPT

## 2025-03-18 NOTE — PROGRESS NOTES
Outpatient Rehab    Physical Therapy Progress Note    Patient Name: Luisa Paidlla  MRN: [id number]  YOB: 1960  Encounter Date: 3/18/2025    Therapy Diagnosis:   Encounter Diagnoses   Name Primary?    Decreased range of motion of lumbar spine Yes    Weakness of both hips        Physician: Emani Spann, NP    Physician Orders: Eval and Treat  Medical Diagnosis: M43.16 (ICD-10-CM) - Anterolisthesis of lumbar spine   Visit # / Visits Authorized:  8 / 10  Date of Evaluation:  1/29/2025   Insurance Authorization Period: 1/29/2025 to 12/31/2025  Plan of Care Certification:  1/29/2025 to 4/23/2025     Time In: 1103   Time Out: 1200  Total Time: 57   Total Billable Time: 30 (PT was 1 on 1 with patient for 30 minutes today)    FOTO:  Intake Score:  %  Survey Score 1:  %  Survey Score 2:  %         Subjective   Her low back is doing really well but she is still having the pain/stiffness in the upper back. She wonders if things like massage, acupuncture, or dry needling would help her..  Pain reported as 2/10.      Objective           Treatment:  Therapeutic Exercise  TE 1: Seated thoracic extension over a chair, 3 x 8  TE 2: Upright bike for 6 minutes, level 3 resistance  TE 4: Chicken wings, 2 x 10  TE 5: SL open books, 15x each side    Manual Therapy  MT 1: R first rib mobs, grade IV  MT 2: Supine thoracic spine manipulation, grade V  MT 3: Prone CT junction PA mobs, grade III    Balance/Neuromuscular Re-Education  NMR 1: Bridges with BTB, 3 x 8  NMR 2: SL clamshells with BTB, 2 x 10 each side  NMR 5: Sit to stands with dowel dianne, 2 x 10  NMR 6: Paloff press with 10#, 2 x 10 each direction  NMR 7: Lateral walking with RTB, 10 yards x3 laps  NMR 8: Hip hinges with dowel dianne, 3 x 8         Assessment & Plan   Assessment: Luisa continues to no reports of low back pain at this time but does remain with thoracic spine pain and stiffness. Today she was educated on joint mobilizations can help improve her  mobility and ultimately her symptoms. She was agreeable to a HVLAT of the mid thoracic spine today and tolerated this very well. Thoracic spine mobility exercises continue to be performed. Hip and core strengthening exercises continue to be performed at this time to ensure her low back pain remains well controlled.  Evaluation/Treatment Tolerance: Patient tolerated treatment well    Patient will continue to benefit from skilled outpatient physical therapy to address the deficits listed in the problem list box on initial evaluation, provide pt/family education and to maximize pt's level of independence in the home and community environment.     Patient's spiritual, cultural, and educational needs considered and patient agreeable to plan of care and goals.           Plan: Continue to progress per POC.    Goals:   Active       Long term goals       Pt will improve FOTO score to </= 65% limited to decrease perceived limitation with maintaining/changing body position.  (Progressing)       Start:  01/29/25    Expected End:  04/23/25            Pt will perform bird dogs with good control to demonstrate improved core strength. (Progressing)       Start:  01/29/25    Expected End:  04/23/25            Pt will improve impaired LE strength by 1/2 MMT grade to improve strength for functional tasks. (Progressing)       Start:  01/29/25    Expected End:  04/23/25            Pt will report no pain during lumbar ROM to promote functional mobility. (Met)       Start:  01/29/25    Expected End:  04/23/25    Resolved:  03/08/25           Resolved       Short term goals       Pt will be compliant with HEP to supplement PT in decreasing pain with functional mobility. (Met)       Start:  01/29/25    Expected End:  03/12/25    Resolved:  03/08/25         Pt will perform RDLs with good control to demonstrate improved core strength. (Met)       Start:  01/29/25    Expected End:  03/12/25    Resolved:  03/18/25         Pt will improve lumbar  ROM to </=minimal loss in all planes to improve functional mobility. (Met)       Start:  01/29/25    Expected End:  03/12/25    Resolved:  03/08/25         Pt will improve impaired LE strength by 1/2 MMT grade to improve strength for functional tasks. (Met)       Start:  01/29/25    Expected End:  03/12/25    Resolved:  03/08/25             MENG COTA, PT

## 2025-03-21 ENCOUNTER — CLINICAL SUPPORT (OUTPATIENT)
Dept: REHABILITATION | Facility: HOSPITAL | Age: 65
End: 2025-03-21
Payer: MEDICARE

## 2025-03-21 DIAGNOSIS — M53.86 DECREASED RANGE OF MOTION OF LUMBAR SPINE: Primary | ICD-10-CM

## 2025-03-21 DIAGNOSIS — R29.898 WEAKNESS OF BOTH HIPS: ICD-10-CM

## 2025-03-21 PROCEDURE — 97112 NEUROMUSCULAR REEDUCATION: CPT

## 2025-03-21 PROCEDURE — 97140 MANUAL THERAPY 1/> REGIONS: CPT

## 2025-03-21 PROCEDURE — 97110 THERAPEUTIC EXERCISES: CPT

## 2025-03-21 NOTE — PROGRESS NOTES
Outpatient Rehab    Physical Therapy Progress Note    Patient Name: Luisa Padilla  MRN: [id number]  YOB: 1960  Encounter Date: 3/21/2025    Therapy Diagnosis:   Encounter Diagnoses   Name Primary?    Decreased range of motion of lumbar spine Yes    Weakness of both hips        Physician: Emani Spann, NP    Physician Orders: Eval and Treat  Medical Diagnosis: M43.16 (ICD-10-CM) - Anterolisthesis of lumbar spine   Visit # / Visits Authorized:  8 / 10  Date of Evaluation:  1/29/2025   Insurance Authorization Period: 1/29/2025 to 12/31/2025  Plan of Care Certification:  1/29/2025 to 4/23/2025     Time In: 1100   Time Out: 1155  Total Time: 55   Total Billable Time: 40 (PT was 1 on 1 with patient for 40 minutes today)    FOTO:  Intake Score:  %  Survey Score 1:  %  Survey Score 2:  %         Subjective   Patient states that she had a massage this morning so her back is a bit sore right now. Overall she has been doing wel over the past week though..  Pain reported as 2/10.      Objective           Treatment:  Therapeutic Exercise  TE 1: Seated thoracic extension over a chair, 3 x 8  TE 2: Upright bike for 6 minutes, level 3 resistance  TE 3: DL shuttle leg press, 75#, 4 x 8  TE 4: Chicken wings, 2 x 10  TE 5: SL open books, 20x each side    Manual Therapy  MT 1: R first rib mobs, grade IV  MT 2: Prone thoracic spine mobs, grade IV  MT 3: Prone CT junction PA mobs, grade III    Balance/Neuromuscular Re-Education  NMR 1: Bridges with BTB, 3 x 8  NMR 2: SL clamshells with BTB, 2 x 10 each side  NMR 5: Sit to stands with dowel dianne, 2 x 10  NMR 6: Paloff press with 10#, 2 x 10 each direction  NMR 7: Lateral walking with GTB, 10 yards x3 laps  NMR 8: Hip hinges with dowel dianne, 3 x 8  NMR 9: RDLs with 15#, 3 x 8         Assessment & Plan   Assessment: Luisa's low back pain remains very well controlled a this time. Her thoracic spine and CT junction continue to be her most painful areas at this time so they  continue to be addressed manually and with mobility exercises. Hip and core strengthening continues to challenge her at this time.  Evaluation/Treatment Tolerance: Patient tolerated treatment well    Patient will continue to benefit from skilled outpatient physical therapy to address the deficits listed in the problem list box on initial evaluation, provide pt/family education and to maximize pt's level of independence in the home and community environment.     Patient's spiritual, cultural, and educational needs considered and patient agreeable to plan of care and goals.           Plan: Continue to progress per POC.    Goals:   Active       Long term goals       Pt will improve FOTO score to </= 65% limited to decrease perceived limitation with maintaining/changing body position.  (Progressing)       Start:  01/29/25    Expected End:  04/23/25            Pt will perform bird dogs with good control to demonstrate improved core strength. (Progressing)       Start:  01/29/25    Expected End:  04/23/25            Pt will improve impaired LE strength by 1/2 MMT grade to improve strength for functional tasks. (Progressing)       Start:  01/29/25    Expected End:  04/23/25            Pt will report no pain during lumbar ROM to promote functional mobility. (Met)       Start:  01/29/25    Expected End:  04/23/25    Resolved:  03/08/25           Resolved       Short term goals       Pt will be compliant with HEP to supplement PT in decreasing pain with functional mobility. (Met)       Start:  01/29/25    Expected End:  03/12/25    Resolved:  03/08/25         Pt will perform RDLs with good control to demonstrate improved core strength. (Met)       Start:  01/29/25    Expected End:  03/12/25    Resolved:  03/18/25         Pt will improve lumbar ROM to </=minimal loss in all planes to improve functional mobility. (Met)       Start:  01/29/25    Expected End:  03/12/25    Resolved:  03/08/25         Pt will improve impaired LE  strength by 1/2 MMT grade to improve strength for functional tasks. (Met)       Start:  01/29/25    Expected End:  03/12/25    Resolved:  03/08/25             MENG COTA PT

## 2025-03-24 DIAGNOSIS — Z00.00 ENCOUNTER FOR MEDICARE ANNUAL WELLNESS EXAM: ICD-10-CM

## 2025-04-01 ENCOUNTER — OFFICE VISIT (OUTPATIENT)
Dept: OPHTHALMOLOGY | Facility: CLINIC | Age: 65
End: 2025-04-01
Payer: MEDICARE

## 2025-04-01 DIAGNOSIS — H26.493 POSTERIOR CAPSULAR OPACIFICATION, BILATERAL: Primary | ICD-10-CM

## 2025-04-01 PROCEDURE — 66821 AFTER CATARACT LASER SURGERY: CPT | Mod: 50,S$GLB,, | Performed by: OPHTHALMOLOGY

## 2025-04-01 PROCEDURE — 3288F FALL RISK ASSESSMENT DOCD: CPT | Mod: CPTII,S$GLB,, | Performed by: OPHTHALMOLOGY

## 2025-04-01 PROCEDURE — 1126F AMNT PAIN NOTED NONE PRSNT: CPT | Mod: CPTII,S$GLB,, | Performed by: OPHTHALMOLOGY

## 2025-04-01 PROCEDURE — 1160F RVW MEDS BY RX/DR IN RCRD: CPT | Mod: CPTII,S$GLB,, | Performed by: OPHTHALMOLOGY

## 2025-04-01 PROCEDURE — 1101F PT FALLS ASSESS-DOCD LE1/YR: CPT | Mod: CPTII,S$GLB,, | Performed by: OPHTHALMOLOGY

## 2025-04-01 PROCEDURE — 1159F MED LIST DOCD IN RCRD: CPT | Mod: CPTII,S$GLB,, | Performed by: OPHTHALMOLOGY

## 2025-04-01 PROCEDURE — 99214 OFFICE O/P EST MOD 30 MIN: CPT | Mod: 57,S$GLB,, | Performed by: OPHTHALMOLOGY

## 2025-04-01 PROCEDURE — 99999 PR PBB SHADOW E&M-EST. PATIENT-LVL III: CPT | Mod: PBBFAC,,, | Performed by: OPHTHALMOLOGY

## 2025-04-01 NOTE — PROGRESS NOTES
HPI    64 yr old female is here today for a YAG Evaluation.  Pt states vision is blurry after cataract sx. Pt had monovision Cataract   sx (OD distance OS Near). Pt states  said there is some   cloudiness there and she should see  again for evaluation.  Pt   states she has dry eyes.      Ivizia (lubricating drops) PRN    Last edited by Sally Rojo on 4/1/2025  2:49 PM.            Assessment /Plan     For exam results, see Encounter Report.    Posterior capsular opacification, unspecified laterality      Visually significant posterior capsular opacity present.  Discussed risks, benefits, and alternatives to laser surgery.  OS>OD    YAG laser capsulotomy Procedure Note:   Informed consent obtained and correct eye(s) verified with patient.  1 drop of topical Proparacaine and Iopidine instilled, and eye(s) dilated with 1% Tropicamide 2.5% Phenylephrine.  YAG laser applied to posterior capsule in cruciate pattern OU  Patient tolerated procedure well. No complications. Follow up in 1 month/PRN.

## 2025-04-09 DIAGNOSIS — F51.01 PRIMARY INSOMNIA: ICD-10-CM

## 2025-04-09 RX ORDER — TRAZODONE HYDROCHLORIDE 50 MG/1
50 TABLET ORAL NIGHTLY
Qty: 90 TABLET | Refills: 0 | OUTPATIENT
Start: 2025-04-09

## 2025-04-09 NOTE — TELEPHONE ENCOUNTER
No care due was identified.  [facility] Embedded Care Due Messages. Reference number: [id number].   4/09/2025 10:26:27 AM CDT

## 2025-04-22 ENCOUNTER — PATIENT MESSAGE (OUTPATIENT)
Dept: PRIMARY CARE CLINIC | Facility: CLINIC | Age: 65
End: 2025-04-22
Payer: MEDICARE

## 2025-04-22 DIAGNOSIS — F51.01 PRIMARY INSOMNIA: ICD-10-CM

## 2025-04-23 RX ORDER — TRAZODONE HYDROCHLORIDE 50 MG/1
50 TABLET ORAL NIGHTLY
Qty: 90 TABLET | Refills: 0 | Status: SHIPPED | OUTPATIENT
Start: 2025-04-23

## 2025-04-23 NOTE — TELEPHONE ENCOUNTER
No care due was identified.  [facility] Embedded Care Due Messages. Reference number: [id number].   4/23/2025 9:11:26 AM CDT

## 2025-05-02 ENCOUNTER — OFFICE VISIT (OUTPATIENT)
Dept: PRIMARY CARE CLINIC | Facility: CLINIC | Age: 65
End: 2025-05-02
Payer: MEDICARE

## 2025-05-02 VITALS
BODY MASS INDEX: 26.17 KG/M2 | WEIGHT: 142.19 LBS | DIASTOLIC BLOOD PRESSURE: 74 MMHG | OXYGEN SATURATION: 98 % | SYSTOLIC BLOOD PRESSURE: 120 MMHG | HEIGHT: 62 IN | HEART RATE: 62 BPM

## 2025-05-02 DIAGNOSIS — E55.9 VITAMIN D DEFICIENCY: ICD-10-CM

## 2025-05-02 DIAGNOSIS — E66.3 OVERWEIGHT: ICD-10-CM

## 2025-05-02 DIAGNOSIS — F41.9 ANXIETY: ICD-10-CM

## 2025-05-02 DIAGNOSIS — E78.2 MIXED HYPERLIPIDEMIA: Primary | ICD-10-CM

## 2025-05-02 DIAGNOSIS — R73.03 PREDIABETES: ICD-10-CM

## 2025-05-02 DIAGNOSIS — F51.01 PRIMARY INSOMNIA: ICD-10-CM

## 2025-05-02 DIAGNOSIS — Z79.899 ENCOUNTER FOR LONG-TERM (CURRENT) USE OF MEDICATIONS: ICD-10-CM

## 2025-05-02 PROBLEM — M53.86 DECREASED RANGE OF MOTION OF LUMBAR SPINE: Status: RESOLVED | Noted: 2025-01-29 | Resolved: 2025-05-02

## 2025-05-02 PROBLEM — I70.0 AORTIC ATHEROSCLEROSIS: Status: RESOLVED | Noted: 2025-02-05 | Resolved: 2025-05-02

## 2025-05-02 PROBLEM — R29.898 WEAKNESS OF BOTH HIPS: Status: RESOLVED | Noted: 2025-01-29 | Resolved: 2025-05-02

## 2025-05-02 PROCEDURE — 99999 PR PBB SHADOW E&M-EST. PATIENT-LVL III: CPT | Mod: PBBFAC,,, | Performed by: INTERNAL MEDICINE

## 2025-05-02 RX ORDER — ESCITALOPRAM OXALATE 5 MG/1
5 TABLET ORAL DAILY
Qty: 90 TABLET | Refills: 3 | Status: SHIPPED | OUTPATIENT
Start: 2025-05-02 | End: 2026-05-02

## 2025-05-02 RX ORDER — TRAZODONE HYDROCHLORIDE 50 MG/1
50 TABLET ORAL NIGHTLY
Qty: 90 TABLET | Refills: 3 | Status: SHIPPED | OUTPATIENT
Start: 2025-05-02

## 2025-05-02 NOTE — ASSESSMENT & PLAN NOTE
Not on meds. CT calcium score 0 in 2/2025.  The 10-year ASCVD risk score (Mai MARIE, et al., 2019) is: 7.6%    Values used to calculate the score:      Age: 65 years      Sex: Female      Is Non- : No      Diabetic: No      Tobacco smoker: No      Systolic Blood Pressure: 142 mmHg      Is BP treated: No      HDL Cholesterol: 77 mg/dL      Total Cholesterol: 208 mg/dL

## 2025-05-02 NOTE — PROGRESS NOTES
[facility] Note    Chief Complaint      Chief Complaint   Patient presents with    Annual Exam     History of Present Illness      Luisa Padilla is a 65 y.o. female who presents today for f/u HLD.  Patient comes to appointment alone.    Did PT for back, took meloxicam which helped.    Problem List Items Addressed This Visit       Mixed hyperlipidemia - Primary    Current Assessment & Plan   Not on meds. CT calcium score 0 in 2/2025.  The 10-year ASCVD risk score (Mai MARIE, et al., 2019) is: 7.6%    Values used to calculate the score:      Age: 65 years      Sex: Female      Is Non- : No      Diabetic: No      Tobacco smoker: No      Systolic Blood Pressure: 142 mmHg      Is BP treated: No      HDL Cholesterol: 77 mg/dL      Total Cholesterol: 208 mg/dL           Relevant Orders    CBC Auto Differential    Lipid Panel    Comprehensive Metabolic Panel    Overweight    Current Assessment & Plan   Mostly eats salads and meat, avoids carbs. Eats twice per day some days.  Exercises daily.         Prediabetes    Current Assessment & Plan   A1C 5.5 in 5/2023, not on meds         Relevant Orders    Hemoglobin A1C    Primary insomnia    Current Assessment & Plan   Stable on trazodone and magnesium which works well.         Relevant Medications    traZODone (DESYREL) 50 MG tablet    Other Relevant Orders    TSH     Other Visit Diagnoses         Encounter for long-term (current) use of medications        Relevant Orders    TSH      Vitamin D deficiency        Relevant Orders    Vitamin D      Anxiety        Relevant Medications    EScitalopram oxalate (LEXAPRO) 5 MG Tab            Health Maintenance   Topic Date Due    Hemoglobin A1c (Prediabetes)  05/05/2024    COVID-19 Vaccine (5 - 2024-25 season) 09/01/2024    Mammogram  06/05/2025    DEXA Scan  05/01/2026    Lipid Panel  05/05/2028    Colorectal Cancer Screening  04/10/2029    TETANUS VACCINE  04/01/2031    Hepatitis C Screening   Completed    Shingles Vaccine  Completed    Influenza Vaccine  Completed    RSV Vaccine (Age 60+ and Pregnant patients)  Completed    Pneumococcal Vaccines (Age 50+)  Completed       Past Medical History:   Diagnosis Date    BRCA gene mutation negative     BRCA1 negative     BRCA2 negative     Cataract     Epiretinal membrane (ERM) of left eye     Menopause     HOLLAND (obstructive sleep apnea)     mild, no need of CPAP    Seasonal allergies        Past Surgical History:   Procedure Laterality Date    CATARACT EXTRACTION W/  INTRAOCULAR LENS IMPLANT Left 2024    Procedure: EXTRACTION, CATARACT, WITH IOL INSERTION;  Surgeon: Luis Antonio Ray MD;  Location: [facility] OR;  Service: Ophthalmology;  Laterality: Left;    CATARACT EXTRACTION W/  INTRAOCULAR LENS IMPLANT Right 2024    Procedure: EXTRACTION, CATARACT, WITH IOL INSERTION;  Surgeon: Luis Antonio Ray MD;  Location: [facility] OR;  Service: Ophthalmology;  Laterality: Right;  ORA ONLY RIGHT EYE     SECTION      x 2    COLONOSCOPY N/A 04/10/2019    Procedure: COLONOSCOPY;  Surgeon: Lauren Lopez MD;  Location: [facility];  Service: Endoscopy;  Laterality: N/A;    EYE SURGERY Left     TENOSYNOVECTOMY, FOREARM OR WRIST Left 2023    Procedure: TENOSYNOVECTOMY, THUMB;  Surgeon: Ale Elkins MD;  Location: [facility] OR;  Service: Orthopedics;  Laterality: Left;  THUMB    VITRECTOMY BY PARS PLANA APPROACH Left 2020    Procedure: VITRECTOMY, PARS PLANA APPROACH;  Surgeon: QUINTIN Copeland MD;  Location: [address];  Service: Ophthalmology;  Laterality: Left;  30 min       family history includes BRCA 1/2 in her sister; Breast cancer in her paternal aunt; Breast cancer (age of onset: 48) in her sister; Cancer in her sister; No Known Problems in her brother and mother; Pulmonary fibrosis in her father; Thyroid disease in her maternal grandmother; Thyroid nodules in her cousin.    Social History     Tobacco Use    Smoking status: Former      Types: Cigarettes    Smokeless tobacco: Never   Substance Use Topics    Alcohol use: Yes     Alcohol/week: 6.0 standard drinks of alcohol     Types: 6 Glasses of wine per week     Comment: a day    Drug use: No     Comment: CBD at night       Review of Systems   Constitutional:  Negative for chills and fever.   Respiratory:  Negative for cough and shortness of breath.    Cardiovascular:  Negative for chest pain and palpitations.   Gastrointestinal:  Negative for constipation, diarrhea, nausea and vomiting.   Genitourinary:  Negative for dysuria and hematuria.   Musculoskeletal:  Negative for falls.   Neurological:  Negative for headaches.        Outpatient Encounter Medications as of 5/2/2025   Medication Sig Dispense Refill    cycloSPORINE 0.1 % Drop Place 1 drop into both eyes 2 (two) times a day. 2 mL 6    fexofenadine (ALLEGRA) 30 MG tablet Take 30 mg by mouth once daily.      glucosamine-chondroitin 500-400 mg tablet Take 1 tablet by mouth 3 (three) times daily.      magnesium glycinate (MAG GLYCINATE ORAL) Take by mouth.      mometasone 0.1% (ELOCON) 0.1 % cream       vit C/E/zinc ox/lester/lut/zeax (ICAPS AREDS2 ORAL) Take by mouth.      [DISCONTINUED] EScitalopram oxalate (LEXAPRO) 5 MG Tab Take 1 tablet (5 mg total) by mouth once daily. 30 tablet 1    [DISCONTINUED] traZODone (DESYREL) 50 MG tablet Take 1 tablet (50 mg total) by mouth every evening. 90 tablet 0    EScitalopram oxalate (LEXAPRO) 5 MG Tab Take 1 tablet (5 mg total) by mouth once daily. 90 tablet 3    traZODone (DESYREL) 50 MG tablet Take 1 tablet (50 mg total) by mouth every evening. 90 tablet 3    [DISCONTINUED] AREXVY, PF, 120 mcg/0.5 mL SusR vaccine       [DISCONTINUED] biotin 1 mg tablet Take 1,000 mcg by mouth 3 (three) times daily. (Patient not taking: Reported on 5/2/2025)      [DISCONTINUED] cholecalciferol, vitamin D3, (VITAMIN D3) 25 mcg (1,000 unit) capsule Take 1 capsule (1,000 Units total) by mouth once daily. (Patient not taking:  "Reported on 5/2/2025) 30 capsule 11    [DISCONTINUED] lifitegrast (XIIDRA) 5 % Dpet Place 1 drop into both eyes every 12 (twelve) hours. 60 each 11    [DISCONTINUED] meloxicam (MOBIC) 7.5 MG tablet Take 1 tablet (7.5 mg total) by mouth once daily. 20 tablet 0    [DISCONTINUED] PREVNAR 20, PF, 0.5 mL Syrg injection       [DISCONTINUED] psyllium 0.52 gram capsule Take 0.52 g by mouth once daily.       Facility-Administered Encounter Medications as of 5/2/2025   Medication Dose Route Frequency Provider Last Rate Last Admin    [DISCONTINUED] sodium chloride 0.9% flush 10 mL  10 mL Intravenous PRN Luis Antonio Ray MD            Review of patient's allergies indicates:   Allergen Reactions    Latex, natural rubber Hives and Itching       Physical Exam      Vital Signs  Pulse: 62  SpO2: 98 %  BP: 120/74  Pain Score: 0-No pain  Height and Weight  Height: 5' 2" (157.5 cm)  Weight: 64.5 kg (142 lb 3.2 oz)  BSA (Calculated - sq m): 1.68 sq meters  BMI (Calculated): 26  Weight in (lb) to have BMI = 25: 136.4]    Physical Exam  Constitutional:       Appearance: She is well-developed.   HENT:      Head: Normocephalic and atraumatic.   Cardiovascular:      Rate and Rhythm: Normal rate and regular rhythm.      Heart sounds: Normal heart sounds. No murmur heard.  Pulmonary:      Effort: Pulmonary effort is normal. No respiratory distress.      Breath sounds: Normal breath sounds.   Abdominal:      General: There is no distension.      Palpations: Abdomen is soft.      Tenderness: There is no abdominal tenderness. There is no guarding.   Skin:     General: Skin is warm and dry.   Neurological:      Mental Status: She is alert. Mental status is at baseline.   Psychiatric:         Behavior: Behavior normal.          Laboratory:  CBC:  No results for input(s): "WBC", "RBC", "HGB", "HCT", "PLT", "MCV", "MCH", "MCHC" in the last 2160 hours.  CMP:  No results for input(s): "GLU", "CALCIUM", "ALBUMIN", "PROT", "NA", "K", "CO2", "CL", "BUN", " ""ALKPHOS", "ALT", "AST", "BILITOT" in the last 2160 hours.    Invalid input(s): "CREATININ"  URINALYSIS:  No results for input(s): "COLORU", "CLARITYU", "SPECGRAV", "PHUR", "PROTEINUA", "GLUCOSEU", "BILIRUBINCON", "BLOODU", "WBCU", "RBCU", "BACTERIA", "MUCUS", "NITRITE", "LEUKOCYTESUR", "UROBILINOGEN", "HYALINECASTS" in the last 2160 hours.   LIPIDS:  No results for input(s): "TSH", "HDL", "CHOL", "TRIG", "LDLCALC", "CHOLHDL", "NONHDLCHOL", "TOTALCHOLEST" in the last 2160 hours.  TSH:  No results for input(s): "TSH" in the last 2160 hours.  A1C:  No results for input(s): "HGBA1C" in the last 2160 hours.    Assessment/Plan     Luisa Padilla is a 65 y.o.female with:    1. Mixed hyperlipidemia  - CBC Auto Differential; Future  - Lipid Panel; Future  - Comprehensive Metabolic Panel; Future    2. Prediabetes  - Hemoglobin A1C; Future    3. Primary insomnia  - TSH; Future  - traZODone (DESYREL) 50 MG tablet; Take 1 tablet (50 mg total) by mouth every evening.  Dispense: 90 tablet; Refill: 3    4. Overweight    5. Encounter for long-term (current) use of medications  - TSH; Future    6. Vitamin D deficiency  - Vitamin D; Future    7. Anxiety  - EScitalopram oxalate (LEXAPRO) 5 MG Tab; Take 1 tablet (5 mg total) by mouth once daily.  Dispense: 90 tablet; Refill: 3    -labs ordered  -Continue current medications and maintain follow up with specialists.    -No follow-ups on file.       Irina Nelson MD  [facility]                    "

## 2025-05-07 ENCOUNTER — LAB VISIT (OUTPATIENT)
Dept: LAB | Facility: HOSPITAL | Age: 65
End: 2025-05-07
Attending: STUDENT IN AN ORGANIZED HEALTH CARE EDUCATION/TRAINING PROGRAM
Payer: MEDICARE

## 2025-05-07 DIAGNOSIS — R73.03 PREDIABETES: ICD-10-CM

## 2025-05-07 DIAGNOSIS — Z79.899 ENCOUNTER FOR LONG-TERM (CURRENT) USE OF MEDICATIONS: ICD-10-CM

## 2025-05-07 DIAGNOSIS — E55.9 VITAMIN D DEFICIENCY: ICD-10-CM

## 2025-05-07 DIAGNOSIS — F51.01 PRIMARY INSOMNIA: ICD-10-CM

## 2025-05-07 DIAGNOSIS — E78.2 MIXED HYPERLIPIDEMIA: ICD-10-CM

## 2025-05-07 LAB
25(OH)D3+25(OH)D2 SERPL-MCNC: 35 NG/ML (ref 30–96)
ABSOLUTE EOSINOPHIL (OHS): 0.12 K/UL
ABSOLUTE MONOCYTE (OHS): 0.43 K/UL (ref 0.3–1)
ABSOLUTE NEUTROPHIL COUNT (OHS): 2.09 K/UL (ref 1.8–7.7)
ALBUMIN SERPL BCP-MCNC: 4.1 G/DL (ref 3.5–5.2)
ALP SERPL-CCNC: 69 UNIT/L (ref 40–150)
ALT SERPL W/O P-5'-P-CCNC: 66 UNIT/L (ref 10–44)
ANION GAP (OHS): 10 MMOL/L (ref 8–16)
AST SERPL-CCNC: 52 UNIT/L (ref 11–45)
BASOPHILS # BLD AUTO: 0.04 K/UL
BASOPHILS NFR BLD AUTO: 1 %
BILIRUB SERPL-MCNC: 0.5 MG/DL (ref 0.1–1)
BUN SERPL-MCNC: 18 MG/DL (ref 8–23)
CALCIUM SERPL-MCNC: 9.1 MG/DL (ref 8.7–10.5)
CHLORIDE SERPL-SCNC: 105 MMOL/L (ref 95–110)
CHOLEST SERPL-MCNC: 220 MG/DL (ref 120–199)
CHOLEST/HDLC SERPL: 2.9 {RATIO} (ref 2–5)
CO2 SERPL-SCNC: 26 MMOL/L (ref 23–29)
CREAT SERPL-MCNC: 0.8 MG/DL (ref 0.5–1.4)
EAG (OHS): 114 MG/DL (ref 68–131)
ERYTHROCYTE [DISTWIDTH] IN BLOOD BY AUTOMATED COUNT: 15.3 % (ref 11.5–14.5)
GFR SERPLBLD CREATININE-BSD FMLA CKD-EPI: >60 ML/MIN/1.73/M2
GLUCOSE SERPL-MCNC: 97 MG/DL (ref 70–110)
HBA1C MFR BLD: 5.6 % (ref 4–5.6)
HCT VFR BLD AUTO: 41.2 % (ref 37–48.5)
HDLC SERPL-MCNC: 77 MG/DL (ref 40–75)
HDLC SERPL: 35 % (ref 20–50)
HGB BLD-MCNC: 12.9 GM/DL (ref 12–16)
IMM GRANULOCYTES # BLD AUTO: 0.02 K/UL (ref 0–0.04)
IMM GRANULOCYTES NFR BLD AUTO: 0.5 % (ref 0–0.5)
LDLC SERPL CALC-MCNC: 124.2 MG/DL (ref 63–159)
LYMPHOCYTES # BLD AUTO: 1.36 K/UL (ref 1–4.8)
MCH RBC QN AUTO: 29.5 PG (ref 27–31)
MCHC RBC AUTO-ENTMCNC: 31.3 G/DL (ref 32–36)
MCV RBC AUTO: 94 FL (ref 82–98)
NONHDLC SERPL-MCNC: 143 MG/DL
NUCLEATED RBC (/100WBC) (OHS): 0 /100 WBC
PLATELET # BLD AUTO: 159 K/UL (ref 150–450)
PMV BLD AUTO: 10.9 FL (ref 9.2–12.9)
POTASSIUM SERPL-SCNC: 4.1 MMOL/L (ref 3.5–5.1)
PROT SERPL-MCNC: 7.1 GM/DL (ref 6–8.4)
RBC # BLD AUTO: 4.38 M/UL (ref 4–5.4)
RELATIVE EOSINOPHIL (OHS): 3 %
RELATIVE LYMPHOCYTE (OHS): 33.5 % (ref 18–48)
RELATIVE MONOCYTE (OHS): 10.6 % (ref 4–15)
RELATIVE NEUTROPHIL (OHS): 51.4 % (ref 38–73)
SODIUM SERPL-SCNC: 141 MMOL/L (ref 136–145)
TRIGL SERPL-MCNC: 94 MG/DL (ref 30–150)
TSH SERPL-ACNC: 1.3 UIU/ML (ref 0.4–4)
WBC # BLD AUTO: 4.06 K/UL (ref 3.9–12.7)

## 2025-05-07 PROCEDURE — 84443 ASSAY THYROID STIM HORMONE: CPT

## 2025-05-07 PROCEDURE — 80061 LIPID PANEL: CPT

## 2025-05-07 PROCEDURE — 80053 COMPREHEN METABOLIC PANEL: CPT

## 2025-05-07 PROCEDURE — 83036 HEMOGLOBIN GLYCOSYLATED A1C: CPT

## 2025-05-07 PROCEDURE — 82306 VITAMIN D 25 HYDROXY: CPT

## 2025-05-07 PROCEDURE — 85025 COMPLETE CBC W/AUTO DIFF WBC: CPT

## 2025-05-07 PROCEDURE — 36415 COLL VENOUS BLD VENIPUNCTURE: CPT

## 2025-05-08 ENCOUNTER — RESULTS FOLLOW-UP (OUTPATIENT)
Dept: PRIMARY CARE CLINIC | Facility: CLINIC | Age: 65
End: 2025-05-08

## 2025-05-08 DIAGNOSIS — R74.8 ELEVATED LIVER ENZYMES: Primary | ICD-10-CM

## 2025-05-21 ENCOUNTER — LAB VISIT (OUTPATIENT)
Dept: LAB | Facility: HOSPITAL | Age: 65
End: 2025-05-21
Attending: INTERNAL MEDICINE
Payer: MEDICARE

## 2025-05-21 DIAGNOSIS — R74.8 ELEVATED LIVER ENZYMES: ICD-10-CM

## 2025-05-21 LAB
ALBUMIN SERPL BCP-MCNC: 3.9 G/DL (ref 3.5–5.2)
ALP SERPL-CCNC: 64 UNIT/L (ref 40–150)
ALT SERPL W/O P-5'-P-CCNC: 111 UNIT/L (ref 10–44)
AST SERPL-CCNC: 71 UNIT/L (ref 11–45)
BILIRUB DIRECT SERPL-MCNC: 0.1 MG/DL (ref 0.1–0.3)
BILIRUB SERPL-MCNC: 0.4 MG/DL (ref 0.1–1)
GGT SERPL-CCNC: 19 U/L (ref 8–55)
PROT SERPL-MCNC: 6.9 GM/DL (ref 6–8.4)

## 2025-05-21 PROCEDURE — 82040 ASSAY OF SERUM ALBUMIN: CPT

## 2025-05-21 PROCEDURE — 36415 COLL VENOUS BLD VENIPUNCTURE: CPT

## 2025-05-21 PROCEDURE — 82977 ASSAY OF GGT: CPT

## 2025-05-22 ENCOUNTER — RESULTS FOLLOW-UP (OUTPATIENT)
Dept: PRIMARY CARE CLINIC | Facility: CLINIC | Age: 65
End: 2025-05-22
Payer: MEDICARE

## 2025-05-23 ENCOUNTER — TELEPHONE (OUTPATIENT)
Dept: OPHTHALMOLOGY | Facility: CLINIC | Age: 65
End: 2025-05-23
Payer: MEDICARE

## 2025-05-23 NOTE — TELEPHONE ENCOUNTER
----- Message from Matty Partida sent at 5/22/2025 11:53 AM CDT -----    ----- Message -----  From: Catie Crespo  Sent: 5/22/2025  11:51 AM CDT  To: [facility] Staff    Type:  Sooner Apoointment RequestCaller is requesting a sooner appointment.  Name of Caller:Nara is the first available appointment?soonerSymptoms:Annual RS Would the patient rather a call back or a response via [employer]? Call Best Call Back Number: [phone] Information:

## 2025-06-04 ENCOUNTER — LAB VISIT (OUTPATIENT)
Dept: LAB | Facility: HOSPITAL | Age: 65
End: 2025-06-04
Attending: INTERNAL MEDICINE
Payer: MEDICARE

## 2025-06-04 ENCOUNTER — RESULTS FOLLOW-UP (OUTPATIENT)
Dept: PRIMARY CARE CLINIC | Facility: CLINIC | Age: 65
End: 2025-06-04
Payer: MEDICARE

## 2025-06-04 DIAGNOSIS — R74.8 ELEVATED LIVER ENZYMES: Primary | ICD-10-CM

## 2025-06-04 DIAGNOSIS — R74.8 ELEVATED LIVER ENZYMES: ICD-10-CM

## 2025-06-04 LAB
ALBUMIN SERPL BCP-MCNC: 4.4 G/DL (ref 3.5–5.2)
ALP SERPL-CCNC: 67 UNIT/L (ref 40–150)
ALT SERPL W/O P-5'-P-CCNC: 66 UNIT/L (ref 10–44)
AST SERPL-CCNC: 36 UNIT/L (ref 11–45)
BILIRUB DIRECT SERPL-MCNC: 0.2 MG/DL (ref 0.1–0.3)
BILIRUB SERPL-MCNC: 0.5 MG/DL (ref 0.1–1)
PROT SERPL-MCNC: 7.2 GM/DL (ref 6–8.4)

## 2025-06-04 PROCEDURE — 82040 ASSAY OF SERUM ALBUMIN: CPT

## 2025-06-04 PROCEDURE — 36415 COLL VENOUS BLD VENIPUNCTURE: CPT

## 2025-06-06 ENCOUNTER — HOSPITAL ENCOUNTER (OUTPATIENT)
Dept: RADIOLOGY | Facility: HOSPITAL | Age: 65
Discharge: HOME | End: 2025-06-06
Attending: INTERNAL MEDICINE
Payer: MEDICARE

## 2025-06-06 VITALS — HEIGHT: 62 IN | WEIGHT: 142 LBS | BODY MASS INDEX: 26.13 KG/M2

## 2025-06-06 DIAGNOSIS — Z12.31 ENCOUNTER FOR SCREENING MAMMOGRAM FOR BREAST CANCER: ICD-10-CM

## 2025-06-06 PROCEDURE — 77067 SCR MAMMO BI INCL CAD: CPT | Mod: TC

## 2025-06-06 PROCEDURE — 77067 SCR MAMMO BI INCL CAD: CPT | Mod: 26,,, | Performed by: RADIOLOGY

## 2025-06-06 PROCEDURE — 77063 BREAST TOMOSYNTHESIS BI: CPT | Mod: 26,,, | Performed by: RADIOLOGY

## 2025-06-10 ENCOUNTER — RESULTS FOLLOW-UP (OUTPATIENT)
Dept: PRIMARY CARE CLINIC | Facility: CLINIC | Age: 65
End: 2025-06-10

## 2025-07-02 ENCOUNTER — LAB VISIT (OUTPATIENT)
Dept: LAB | Facility: HOSPITAL | Age: 65
End: 2025-07-02
Attending: INTERNAL MEDICINE
Payer: MEDICARE

## 2025-07-02 DIAGNOSIS — R74.8 ELEVATED LIVER ENZYMES: ICD-10-CM

## 2025-07-02 LAB
ALBUMIN SERPL BCP-MCNC: 4.3 G/DL (ref 3.5–5.2)
ALP SERPL-CCNC: 64 UNIT/L (ref 40–150)
ALT SERPL W/O P-5'-P-CCNC: 53 UNIT/L (ref 10–44)
AST SERPL-CCNC: 33 UNIT/L (ref 11–45)
BILIRUB DIRECT SERPL-MCNC: 0.2 MG/DL (ref 0.1–0.3)
BILIRUB SERPL-MCNC: 0.4 MG/DL (ref 0.1–1)
GGT SERPL-CCNC: 24 U/L (ref 8–55)
PROT SERPL-MCNC: 7.2 GM/DL (ref 6–8.4)

## 2025-07-02 PROCEDURE — 36415 COLL VENOUS BLD VENIPUNCTURE: CPT

## 2025-07-02 PROCEDURE — 82977 ASSAY OF GGT: CPT

## 2025-07-02 PROCEDURE — 80076 HEPATIC FUNCTION PANEL: CPT

## 2025-07-03 ENCOUNTER — RESULTS FOLLOW-UP (OUTPATIENT)
Dept: PRIMARY CARE CLINIC | Facility: CLINIC | Age: 65
End: 2025-07-03

## 2025-08-15 ENCOUNTER — OFFICE VISIT (OUTPATIENT)
Dept: OPTOMETRY | Facility: CLINIC | Age: 65
End: 2025-08-15
Payer: COMMERCIAL

## 2025-08-15 DIAGNOSIS — H35.372 EPIRETINAL MEMBRANE (ERM) OF LEFT EYE: ICD-10-CM

## 2025-08-15 DIAGNOSIS — H52.12 MYOPIA OF LEFT EYE: Primary | ICD-10-CM

## 2025-08-15 DIAGNOSIS — Z96.1 PSEUDOPHAKIA OF BOTH EYES: ICD-10-CM

## 2025-08-15 DIAGNOSIS — H04.123 DRY EYE SYNDROME, BILATERAL: ICD-10-CM

## 2025-08-15 DIAGNOSIS — Z98.890 S/P YAG CAPSULOTOMY, BILATERAL: ICD-10-CM

## 2025-08-15 PROCEDURE — 99999 PR PBB SHADOW E&M-EST. PATIENT-LVL III: CPT | Mod: PBBFAC,,, | Performed by: OPTOMETRIST

## 2025-08-15 RX ORDER — CYCLOSPORINE OPHTHALMIC SOLUTION 1 MG/ML
1 SOLUTION/ DROPS OPHTHALMIC 2 TIMES DAILY
Qty: 2 ML | Refills: 6 | Status: SHIPPED | OUTPATIENT
Start: 2025-08-15 | End: 2026-08-15

## (undated) DEVICE — GLOVE SENSICARE PI SURG 7.5

## (undated) DEVICE — BNDG COFLEX FOAM LF2 ST 3X5YD

## (undated) DEVICE — SOL POVIDONE SCRUB IODINE 4 OZ

## (undated) DEVICE — PAD CAST SPECIALIST 2X4

## (undated) DEVICE — NDL 18GA X1 1/2 REG BEVEL

## (undated) DEVICE — CANNULA 25GA SOFT TIP

## (undated) DEVICE — WRAP COBAN SELF ADH ASST 3IN

## (undated) DEVICE — GAUZE SPONGE 4X4 12PLY

## (undated) DEVICE — SOL BETADINE 5%

## (undated) DEVICE — SUT PROLENE 3-0 FS-2 18

## (undated) DEVICE — DRESSING N ADH OIL EMUL 3X3

## (undated) DEVICE — COVER CAMERA OPERATING ROOM

## (undated) DEVICE — FORCEP GRASPING 25GA SMOOTH

## (undated) DEVICE — PAD EYE OVAL CNTOUR 1.62X2.62

## (undated) DEVICE — Device

## (undated) DEVICE — SYR ONLY LUER LOCK 20CC

## (undated) DEVICE — NDL SAFETY 22G X 1.5 ECLIPSE

## (undated) DEVICE — STOCKINETTE DBL PLY ST 4X

## (undated) DEVICE — GLOVE BIOGEL PI MICRO SZ 7

## (undated) DEVICE — TOURNIQUET SB QC DP 18X4IN

## (undated) DEVICE — SOL SALINE STER BOTTLE 500ML

## (undated) DEVICE — PAD CAST 2 IN X 4YDS STERILE

## (undated) DEVICE — KNIFE CARPAL TUNNEL

## (undated) DEVICE — SHIELD EYE METAL FOX 50/BX